# Patient Record
Sex: MALE | Race: OTHER | NOT HISPANIC OR LATINO | Employment: OTHER | URBAN - METROPOLITAN AREA
[De-identification: names, ages, dates, MRNs, and addresses within clinical notes are randomized per-mention and may not be internally consistent; named-entity substitution may affect disease eponyms.]

---

## 2017-01-19 ENCOUNTER — APPOINTMENT (OUTPATIENT)
Dept: LAB | Age: 82
End: 2017-01-19
Payer: COMMERCIAL

## 2017-01-19 ENCOUNTER — TRANSCRIBE ORDERS (OUTPATIENT)
Dept: ADMINISTRATIVE | Age: 82
End: 2017-01-19

## 2017-01-19 DIAGNOSIS — N18.30 CHRONIC KIDNEY DISEASE, STAGE III (MODERATE) (HCC): ICD-10-CM

## 2017-01-19 LAB
ALBUMIN SERPL BCP-MCNC: 3.7 G/DL (ref 3.5–5)
ALP SERPL-CCNC: 109 U/L (ref 46–116)
ALT SERPL W P-5'-P-CCNC: 27 U/L (ref 12–78)
ANION GAP SERPL CALCULATED.3IONS-SCNC: 7 MMOL/L (ref 4–13)
AST SERPL W P-5'-P-CCNC: 16 U/L (ref 5–45)
BACTERIA UR QL AUTO: NORMAL /HPF
BASOPHILS # BLD AUTO: 0.06 THOUSANDS/ΜL (ref 0–0.1)
BASOPHILS NFR BLD AUTO: 1 % (ref 0–1)
BILIRUB SERPL-MCNC: 1.1 MG/DL (ref 0.2–1)
BILIRUB UR QL STRIP: NEGATIVE
BUN SERPL-MCNC: 23 MG/DL (ref 5–25)
CALCIUM SERPL-MCNC: 9.1 MG/DL (ref 8.3–10.1)
CHLORIDE SERPL-SCNC: 105 MMOL/L (ref 100–108)
CLARITY UR: CLEAR
CO2 SERPL-SCNC: 29 MMOL/L (ref 21–32)
COLOR UR: YELLOW
CREAT SERPL-MCNC: 1.62 MG/DL (ref 0.6–1.3)
CREAT UR-MCNC: 180 MG/DL
EOSINOPHIL # BLD AUTO: 0.28 THOUSAND/ΜL (ref 0–0.61)
EOSINOPHIL NFR BLD AUTO: 4 % (ref 0–6)
ERYTHROCYTE [DISTWIDTH] IN BLOOD BY AUTOMATED COUNT: 14.4 % (ref 11.6–15.1)
GFR SERPL CREATININE-BSD FRML MDRD: 40.9 ML/MIN/1.73SQ M
GLUCOSE SERPL-MCNC: 105 MG/DL (ref 65–140)
GLUCOSE UR STRIP-MCNC: NEGATIVE MG/DL
HCT VFR BLD AUTO: 42.2 % (ref 36.5–49.3)
HGB BLD-MCNC: 13.4 G/DL (ref 12–17)
HGB UR QL STRIP.AUTO: NEGATIVE
KETONES UR STRIP-MCNC: NEGATIVE MG/DL
LEUKOCYTE ESTERASE UR QL STRIP: NEGATIVE
LYMPHOCYTES # BLD AUTO: 2.15 THOUSANDS/ΜL (ref 0.6–4.47)
LYMPHOCYTES NFR BLD AUTO: 33 % (ref 14–44)
MAGNESIUM SERPL-MCNC: 2.2 MG/DL (ref 1.6–2.6)
MCH RBC QN AUTO: 29.5 PG (ref 26.8–34.3)
MCHC RBC AUTO-ENTMCNC: 31.8 G/DL (ref 31.4–37.4)
MCV RBC AUTO: 93 FL (ref 82–98)
MONOCYTES # BLD AUTO: 0.62 THOUSAND/ΜL (ref 0.17–1.22)
MONOCYTES NFR BLD AUTO: 10 % (ref 4–12)
NEUTROPHILS # BLD AUTO: 3.35 THOUSANDS/ΜL (ref 1.85–7.62)
NEUTS SEG NFR BLD AUTO: 52 % (ref 43–75)
NITRITE UR QL STRIP: NEGATIVE
NON-SQ EPI CELLS URNS QL MICRO: NORMAL /HPF
NRBC BLD AUTO-RTO: 0 /100 WBCS
PH UR STRIP.AUTO: 5.5 [PH] (ref 4.5–8)
PHOSPHATE SERPL-MCNC: 3 MG/DL (ref 2.3–4.1)
PLATELET # BLD AUTO: 227 THOUSANDS/UL (ref 149–390)
PMV BLD AUTO: 9.8 FL (ref 8.9–12.7)
POTASSIUM SERPL-SCNC: 3.7 MMOL/L (ref 3.5–5.3)
PROT SERPL-MCNC: 7.8 G/DL (ref 6.4–8.2)
PROT UR STRIP-MCNC: ABNORMAL MG/DL
PROT UR-MCNC: 28 MG/DL
PROT/CREAT UR: 0.16 MG/G{CREAT} (ref 0–0.1)
PTH-INTACT SERPL-MCNC: 98.2 PG/ML (ref 14–72)
RBC # BLD AUTO: 4.54 MILLION/UL (ref 3.88–5.62)
RBC #/AREA URNS AUTO: NORMAL /HPF
SODIUM SERPL-SCNC: 141 MMOL/L (ref 136–145)
SP GR UR STRIP.AUTO: 1.02 (ref 1–1.03)
URATE SERPL-MCNC: 4.8 MG/DL (ref 4.2–8)
UROBILINOGEN UR QL STRIP.AUTO: 0.2 E.U./DL
WBC # BLD AUTO: 6.48 THOUSAND/UL (ref 4.31–10.16)
WBC #/AREA URNS AUTO: NORMAL /HPF

## 2017-01-19 PROCEDURE — 81001 URINALYSIS AUTO W/SCOPE: CPT

## 2017-01-19 PROCEDURE — 82570 ASSAY OF URINE CREATININE: CPT

## 2017-01-19 PROCEDURE — 80053 COMPREHEN METABOLIC PANEL: CPT

## 2017-01-19 PROCEDURE — 84100 ASSAY OF PHOSPHORUS: CPT

## 2017-01-19 PROCEDURE — 84156 ASSAY OF PROTEIN URINE: CPT

## 2017-01-19 PROCEDURE — 36415 COLL VENOUS BLD VENIPUNCTURE: CPT

## 2017-01-19 PROCEDURE — 83970 ASSAY OF PARATHORMONE: CPT

## 2017-01-19 PROCEDURE — 83735 ASSAY OF MAGNESIUM: CPT

## 2017-01-19 PROCEDURE — 85025 COMPLETE CBC W/AUTO DIFF WBC: CPT

## 2017-01-19 PROCEDURE — 84550 ASSAY OF BLOOD/URIC ACID: CPT

## 2017-01-23 ENCOUNTER — GENERIC CONVERSION - ENCOUNTER (OUTPATIENT)
Dept: OTHER | Facility: OTHER | Age: 82
End: 2017-01-23

## 2017-01-26 ENCOUNTER — GENERIC CONVERSION - ENCOUNTER (OUTPATIENT)
Dept: OTHER | Facility: OTHER | Age: 82
End: 2017-01-26

## 2017-01-26 ENCOUNTER — ALLSCRIPTS OFFICE VISIT (OUTPATIENT)
Dept: OTHER | Facility: OTHER | Age: 82
End: 2017-01-26

## 2017-03-21 ENCOUNTER — GENERIC CONVERSION - ENCOUNTER (OUTPATIENT)
Dept: OTHER | Facility: OTHER | Age: 82
End: 2017-03-21

## 2017-03-22 ENCOUNTER — TRANSCRIBE ORDERS (OUTPATIENT)
Dept: ADMINISTRATIVE | Age: 82
End: 2017-03-22

## 2017-03-22 ENCOUNTER — APPOINTMENT (OUTPATIENT)
Dept: LAB | Age: 82
End: 2017-03-22
Payer: COMMERCIAL

## 2017-03-22 DIAGNOSIS — N13.8 ENLARGED PROSTATE WITH URINARY OBSTRUCTION: Primary | ICD-10-CM

## 2017-03-22 DIAGNOSIS — N13.8 ENLARGED PROSTATE WITH URINARY OBSTRUCTION: ICD-10-CM

## 2017-03-22 DIAGNOSIS — N40.1 ENLARGED PROSTATE WITH URINARY OBSTRUCTION: ICD-10-CM

## 2017-03-22 DIAGNOSIS — N40.1 ENLARGED PROSTATE WITH URINARY OBSTRUCTION: Primary | ICD-10-CM

## 2017-03-22 LAB
BUN SERPL-MCNC: 24 MG/DL (ref 5–25)
CREAT SERPL-MCNC: 1.52 MG/DL (ref 0.6–1.3)
GFR SERPL CREATININE-BSD FRML MDRD: 43.9 ML/MIN/1.73SQ M
PSA SERPL-MCNC: 3.2 NG/ML (ref 0–4)

## 2017-03-22 PROCEDURE — 82565 ASSAY OF CREATININE: CPT

## 2017-03-22 PROCEDURE — 84520 ASSAY OF UREA NITROGEN: CPT

## 2017-03-22 PROCEDURE — 36415 COLL VENOUS BLD VENIPUNCTURE: CPT

## 2017-03-22 PROCEDURE — G0103 PSA SCREENING: HCPCS

## 2017-04-24 ENCOUNTER — ALLSCRIPTS OFFICE VISIT (OUTPATIENT)
Dept: OTHER | Facility: OTHER | Age: 82
End: 2017-04-24

## 2017-06-05 ENCOUNTER — GENERIC CONVERSION - ENCOUNTER (OUTPATIENT)
Dept: OTHER | Facility: OTHER | Age: 82
End: 2017-06-05

## 2017-06-27 ENCOUNTER — TRANSCRIBE ORDERS (OUTPATIENT)
Dept: URGENT CARE | Age: 82
End: 2017-06-27

## 2017-06-27 ENCOUNTER — APPOINTMENT (OUTPATIENT)
Dept: RADIOLOGY | Age: 82
End: 2017-06-27
Attending: PHYSICIAN ASSISTANT
Payer: COMMERCIAL

## 2017-06-27 ENCOUNTER — OFFICE VISIT (OUTPATIENT)
Dept: URGENT CARE | Age: 82
End: 2017-06-27
Payer: COMMERCIAL
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
## 2017-06-27 PROCEDURE — 71101 X-RAY EXAM UNILAT RIBS/CHEST: CPT

## 2017-06-27 PROCEDURE — 99203 OFFICE O/P NEW LOW 30 MIN: CPT | Performed by: FAMILY MEDICINE

## 2017-06-27 PROCEDURE — S9088 SERVICES PROVIDED IN URGENT: HCPCS | Performed by: FAMILY MEDICINE

## 2017-06-27 PROCEDURE — 90715 TDAP VACCINE 7 YRS/> IM: CPT

## 2017-06-27 PROCEDURE — 73080 X-RAY EXAM OF ELBOW: CPT

## 2017-07-26 DIAGNOSIS — N18.30 CHRONIC KIDNEY DISEASE, STAGE III (MODERATE) (HCC): ICD-10-CM

## 2017-08-15 ENCOUNTER — TRANSCRIBE ORDERS (OUTPATIENT)
Dept: ADMINISTRATIVE | Facility: HOSPITAL | Age: 82
End: 2017-08-15

## 2017-08-15 DIAGNOSIS — I71.9 HYALINE NECROSIS OF AORTA (HCC): Primary | ICD-10-CM

## 2017-09-06 ENCOUNTER — HOSPITAL ENCOUNTER (OUTPATIENT)
Dept: RADIOLOGY | Age: 82
Discharge: HOME | End: 2017-09-06
Payer: COMMERCIAL

## 2017-09-06 DIAGNOSIS — I71.9 HYALINE NECROSIS OF AORTA (HCC): ICD-10-CM

## 2017-09-06 PROCEDURE — 71250 CT THORAX DX C-: CPT

## 2017-09-13 ENCOUNTER — GENERIC CONVERSION - ENCOUNTER (OUTPATIENT)
Dept: OTHER | Facility: OTHER | Age: 82
End: 2017-09-13

## 2017-10-02 ENCOUNTER — ALLSCRIPTS OFFICE VISIT (OUTPATIENT)
Dept: OTHER | Facility: OTHER | Age: 82
End: 2017-10-02

## 2017-10-03 NOTE — PROGRESS NOTES
Assessment  Assessed    1  Coronary artery disease (414 00) (I25 10)   2  Hypertension (401 9) (I10)   3  Aortic aneurysm (441 9) (I71 9)    Plan  Aortic aneurysm    · Follow-up visit in 1 year Evaluation and Treatment  Follow-up  Status: Hold For -  Scheduling  Requested for: [id number]   Ordered; For: Aortic aneurysm; Ordered By: Jenn Mcgregor Performed:  Due: 40FWI3560  Hypertension    · EKG/ECG- POC; Status:Complete;   Done: 32MCY5416 09:47AM   Perform: In Office; Last Updated Claudette Harkins; 10/2/2017 9:47:10 AM;Ordered;For:Hypertension; Ordered By:Jordan Ochoa;    Discussion/Summary  Cardiology Discussion Summary Free Text Note Form [facility]:   All of his assessed cardiac problems are stable  I have reviewed his medications and made no changes  No cardiac testing is ordered  RTO 1 year  Chief Complaint  Chief Complaint Free Text Note Form: f/u  denies any cardiac issues      History of Present Illness  Cardiology HPI Free Text Note Form [facility]: He has not had any cardiac problems since his last OV  His BP is controlled  He is active and denies CP, SOB, palpitations, LE edema  His dilated Ascending Aorta is very stable  ECG is normal       Review of Systems  Cardiology Male ROS:     Cardiac: No complaints of chest pain, no palpitations, no fainiting  Skin: No complaints of nonhealing sores or skin rash  Genitourinary: No complaints of recurrent urinary tract infections, frequent urination at night, difficult urination, blood in urine, kidney stones, loss of bladder control, no kidney or prostate problems, no erectile dysfunction  Psychological: No complaints of feeling depressed, anxiety, panic attacks, or difficulty concentrating  General: No complaints of trouble sleeping, lack of energy, fatigue, appetite changes, weight changes, fever, frequent infections, or night sweats  Respiratory: No complaints of shortness of breath, cough with sputum, or wheezing     HEENT: No complaints of serious problems, hearing problems, nose problems, throat problems, or snoring  Gastrointestinal: No complaints of liver problems, nausea, vomiting, heartburn, constipation, bloody stools, diarrhea, problems swallowing, adbominal pain, or rectal bleeding  Hematologic: No complaints of bleeding disorders, anemia, blood clots, or excessive brusing  Neurological: No complaints of numbness, tingling, dizziness, weakness, seizures, headaches, syncope or fainting, AM fatigue, daytime sleepiness, no witnessed apnea episodes  Musculoskeletal: No complaints of arthritis, back pain, or painfull swelling  ROS Reviewed:   ROS reviewed  Active Problems  Problems    1  Abnormal weight loss (783 21) (R63 4)   2  Acute kidney injury (584 9) (N17 9)   3  Anemia (285 9) (D64 9)   4  Aortic aneurysm (441 9) (I71 9)   5  Bilateral renal cysts (753 10) (N28 1)   6  CKD (chronic kidney disease), stage III (585 3) (N18 3)   7  Coronary artery disease (414 00) (I25 10)   8  Elbow pain (719 42) (M25 529)   9  Fall (E888 9) (W19 XXXA)   10  Hypertension (401 9) (I10)   11  Need for DTP vaccine (V06 1) (Z23)   12  Transient ischemic attack (435 9) (G45 9)    Past Medical History  Problems    1  History of Acute retention of urine (788 29) (R33 8)   2  History of Diverticulitis (562 11) (K57 92)  Active Problems And Past Medical History Reviewed: The active problems and past medical history were reviewed and updated today  Surgical History  Problems    1  History of Colonoscopy (Fiberoptic)   2  History of Sigmoidoscopy (Fiberoptic, Therapeutic )  Surgical History Reviewed: The surgical history was reviewed and updated today  Family History  Sister    1  Family history of diabetes mellitus (V18 0) (Z83 3)  Brother    2  Family history of malignant neoplasm (V16 9) (Z80 9)  Family History Reviewed: The family history was reviewed and updated today         Social History  Problems    · Alcohol use (V49 89) (Z78 9)   · Denied: Drug use (305 90) (F19 90)   · Former smoker (E95 91) (F16 063)  Social History Reviewed: The social history was reviewed and updated today  The social history was reviewed and is unchanged  Current Meds   1  Adult Aspirin Low Strength 81 MG CHEW; 1 Tab 2 x wkly; Therapy: (Recorded:12Nov2015) to Recorded   2  AmLODIPine Besylate 5 MG Oral Tablet; TAKE ONE TABLET BY MOUTH ONCE DAILY; Therapy: 03OSV1453 to (Evaluate:19Apr2018)  Requested for: 24Apr2017; Last   Rx:24Apr2017 Ordered   3  Atorvastatin Calcium 20 MG Oral Tablet; TAKE 1 TABLET DAILY; Therapy: (Recorded:08Jun2015) to Recorded   4  Linzess 145 MCG Oral Capsule; 1 TAB EVERY OTHER DAY; Therapy: (Recorded:08Jun2015) to Recorded   5  Metamucil Clear & Natural POWD; use as directed; Therapy: (Recorded:08Jun2015) to Recorded   6  MiraLax POWD; USE AS DIRECTED; Therapy: (Recorded:08Jun2015) to Recorded   7  Omeprazole 40 MG Oral Capsule Delayed Release; 1 tab  before Breakfast Recorded   8  One Daily Mens TABS; TAKE 1 TABLET DAILY; Therapy: (Recorded:08Jun2015) to Recorded   9  PreserVision AREDS TABS; TAKE 1 TABLET EVERY 12 HOURS; Therapy: (Recorded:12Nov2015) to Recorded   10  Tamsulosin HCl 0 4 MG CP24; TAKE 1 CAPSULE DAILY; Therapy: (Recorded:08Jun2015) to Recorded   11  Vitamin D3 2000 UNIT Oral Tablet; Take 1 daily; Therapy: 66Wvv8300 to (Evaluate:61Dbf5961)  Requested for: 47Yax2983; Last    Rx:16Cls1722 Ordered  Medication List Reviewed: The medication list was reviewed and updated today  Allergies  Medication    1  Adhesive Tape TAPE    Vitals  Vital Signs    Recorded: 76SUM1820 09:48AM   Heart Rate 77   Systolic 429, LUE, Sitting   Diastolic 70, LUE, Sitting   Height 5 ft 4 in   Weight 139 lb    BMI Calculated 23 86   BSA Calculated 1 68     Physical Exam    Constitutional - General appearance: No acute distress, well appearing and well nourished     Eyes - Conjunctiva and Sclera examination: Conjunctiva pink, sclera anicteric  Neck - Normal, no JVD   Pulmonary - Respiratory effort: No signs of respiratory distress  -Auscultation of lungs: Clear to auscultation  Cardiovascular - Auscultation of heart: Normal rate and rhythm, normal S1 and S2, no murmurs  -Pedal pulses: Normal, 2+ bilaterally  -Examination of extremities for edema and/or varicosities: Normal     Abdomen - Soft  Musculoskeletal - Gait and station: Normal gait  Skin - Skin: Normal without rashes  Skin is warm and well perfused  Neurologic - Speech normal  No focal deficits  Psychiatric - Orientation to person, place, and time: Normal       Results/Data  ECG Report: A 12 lead ECG was performed and was normal    Rhythm and rate:  normal sinus rhythm        Signatures   Electronically signed by : ANH Cuenca ; Oct  2 2017 10:15AM EST                       (Author)

## 2017-11-07 ENCOUNTER — GENERIC CONVERSION - ENCOUNTER (OUTPATIENT)
Dept: OTHER | Facility: OTHER | Age: 82
End: 2017-11-07

## 2017-11-08 ENCOUNTER — TRANSCRIBE ORDERS (OUTPATIENT)
Dept: ADMINISTRATIVE | Age: 82
End: 2017-11-08

## 2017-11-08 ENCOUNTER — APPOINTMENT (OUTPATIENT)
Dept: LAB | Age: 82
End: 2017-11-08
Payer: COMMERCIAL

## 2017-11-08 ENCOUNTER — OFFICE VISIT (OUTPATIENT)
Dept: LAB | Age: 82
End: 2017-11-08
Payer: COMMERCIAL

## 2017-11-08 DIAGNOSIS — K40.90 INGUINAL HERNIA WITHOUT OBSTRUCTION OR GANGRENE, RECURRENCE NOT SPECIFIED, UNSPECIFIED LATERALITY: ICD-10-CM

## 2017-11-08 DIAGNOSIS — K40.90 INGUINAL HERNIA WITHOUT OBSTRUCTION OR GANGRENE, RECURRENCE NOT SPECIFIED, UNSPECIFIED LATERALITY: Primary | ICD-10-CM

## 2017-11-08 LAB
ALBUMIN SERPL BCP-MCNC: 3.4 G/DL (ref 3.5–5)
ALP SERPL-CCNC: 98 U/L (ref 46–116)
ALT SERPL W P-5'-P-CCNC: 22 U/L (ref 12–78)
ANION GAP SERPL CALCULATED.3IONS-SCNC: 5 MMOL/L (ref 4–13)
AST SERPL W P-5'-P-CCNC: 14 U/L (ref 5–45)
ATRIAL RATE: 58 BPM
BILIRUB SERPL-MCNC: 0.97 MG/DL (ref 0.2–1)
BUN SERPL-MCNC: 28 MG/DL (ref 5–25)
CALCIUM SERPL-MCNC: 8.9 MG/DL (ref 8.3–10.1)
CHLORIDE SERPL-SCNC: 104 MMOL/L (ref 100–108)
CO2 SERPL-SCNC: 29 MMOL/L (ref 21–32)
CREAT SERPL-MCNC: 1.66 MG/DL (ref 0.6–1.3)
ERYTHROCYTE [DISTWIDTH] IN BLOOD BY AUTOMATED COUNT: 14 % (ref 11.6–15.1)
GFR SERPL CREATININE-BSD FRML MDRD: 37 ML/MIN/1.73SQ M
GLUCOSE SERPL-MCNC: 89 MG/DL (ref 65–140)
HCT VFR BLD AUTO: 41.2 % (ref 36.5–49.3)
HGB BLD-MCNC: 13.1 G/DL (ref 12–17)
MCH RBC QN AUTO: 29.4 PG (ref 26.8–34.3)
MCHC RBC AUTO-ENTMCNC: 31.8 G/DL (ref 31.4–37.4)
MCV RBC AUTO: 93 FL (ref 82–98)
P AXIS: 38 DEGREES
PLATELET # BLD AUTO: 198 THOUSANDS/UL (ref 149–390)
PMV BLD AUTO: 9.2 FL (ref 8.9–12.7)
POTASSIUM SERPL-SCNC: 4.1 MMOL/L (ref 3.5–5.3)
PR INTERVAL: 156 MS
PROT SERPL-MCNC: 7.5 G/DL (ref 6.4–8.2)
QRS AXIS: 59 DEGREES
QRSD INTERVAL: 86 MS
QT INTERVAL: 418 MS
QTC INTERVAL: 410 MS
RBC # BLD AUTO: 4.45 MILLION/UL (ref 3.88–5.62)
SODIUM SERPL-SCNC: 138 MMOL/L (ref 136–145)
T WAVE AXIS: 71 DEGREES
VENTRICULAR RATE: 58 BPM
WBC # BLD AUTO: 7.08 THOUSAND/UL (ref 4.31–10.16)

## 2017-11-08 PROCEDURE — 36415 COLL VENOUS BLD VENIPUNCTURE: CPT

## 2017-11-08 PROCEDURE — 93005 ELECTROCARDIOGRAM TRACING: CPT

## 2017-11-08 PROCEDURE — 85027 COMPLETE CBC AUTOMATED: CPT

## 2017-11-08 PROCEDURE — 80053 COMPREHEN METABOLIC PANEL: CPT

## 2017-11-14 ENCOUNTER — ANESTHESIA EVENT (OUTPATIENT)
Dept: PERIOP | Facility: HOSPITAL | Age: 82
End: 2017-11-14
Payer: COMMERCIAL

## 2017-11-14 RX ORDER — ASPIRIN 81 MG/1
81 TABLET ORAL DAILY
COMMUNITY

## 2017-11-14 RX ORDER — CHOLECALCIFEROL (VITAMIN D3) 125 MCG
2000 CAPSULE ORAL DAILY
COMMUNITY
End: 2018-02-08 | Stop reason: SDUPTHER

## 2017-11-14 RX ORDER — TAMSULOSIN HYDROCHLORIDE 0.4 MG/1
0.4 CAPSULE ORAL
COMMUNITY
End: 2018-02-08 | Stop reason: SDUPTHER

## 2017-11-14 RX ORDER — AMLODIPINE BESYLATE 5 MG/1
5 TABLET ORAL DAILY
COMMUNITY
End: 2018-02-08 | Stop reason: SDUPTHER

## 2017-11-14 RX ORDER — OMEPRAZOLE 40 MG/1
40 CAPSULE, DELAYED RELEASE ORAL DAILY
COMMUNITY
End: 2018-02-08 | Stop reason: SDUPTHER

## 2017-11-14 RX ORDER — POLYETHYLENE GLYCOL 3350 17 G/17G
17 POWDER, FOR SOLUTION ORAL AS NEEDED
COMMUNITY
End: 2018-02-08 | Stop reason: SDUPTHER

## 2017-11-14 RX ORDER — ATORVASTATIN CALCIUM 20 MG/1
20 TABLET, FILM COATED ORAL DAILY
COMMUNITY
End: 2018-02-08 | Stop reason: SDUPTHER

## 2017-11-14 RX ORDER — MULTIVITAMIN
1 CAPSULE ORAL DAILY
COMMUNITY
End: 2018-02-08 | Stop reason: SDUPTHER

## 2017-11-14 NOTE — ANESTHESIA PREPROCEDURE EVALUATION
Review of Systems/Medical History  Patient summary reviewed  Chart reviewed  No history of anesthetic complications     Cardiovascular  Hyperlipidemia, Hypertension , Aortic disease,   Comment: CT chest: IMPRESSION:     Atherosclerosis, tortuous ectatic thoracic aorta  Fusiform aneurysmal dilatation in the lower 3rd of the thoracic aorta without suspicious change      Interval increase in size of RIGHT anterior upper renal lesion likely represents cyst   Suggest confirmation with ultrasound    ,  Pulmonary       GI/Hepatic    GERD ,   Comment: IBS     Prostatic disorder, benign prostatic hyperplasia       Endo/Other     GYN       Hematology   Musculoskeletal       Neurology   Psychology           Physical Exam    Airway    Mallampati score: II  TM Distance: >3 FB  Neck ROM: full     Dental   No notable dental hx     Cardiovascular  Cardiovascular exam normal    Pulmonary  Pulmonary exam normal     Other Findings        Anesthesia Plan  ASA Score- 3       Anesthesia Type- general with ASA Monitors  Additional Monitors:   Airway Plan: ETT  Induction- intravenous  Informed Consent- Anesthetic plan and risks discussed with patient  I personally reviewed this patient with the CRNA  Discussed and agreed on the Anesthesia Plan with the CRNA  Magnolia Rodriguez

## 2017-11-14 NOTE — PRE-PROCEDURE INSTRUCTIONS
Pre-Surgery Instructions:   Medication Instructions    amLODIPine (NORVASC) 5 mg tablet Instructed patient per Anesthesia Guidelines   aspirin (ECOTRIN LOW STRENGTH) 81 mg EC tablet Instructed patient per Anesthesia Guidelines   atorvastatin (LIPITOR) 20 mg tablet Instructed patient per Anesthesia Guidelines   Cholecalciferol (VITAMIN D3) 2000 units TABS Instructed patient per Anesthesia Guidelines   Linaclotide (LINZESS) 145 MCG CAPS Instructed patient per Anesthesia Guidelines   Multiple Vitamin (MULTIVITAMIN) capsule Instructed patient per Anesthesia Guidelines   Multiple Vitamins-Minerals (PRESERVISION AREDS 2 PO) Instructed patient per Anesthesia Guidelines   omeprazole (PriLOSEC) 40 MG capsule Instructed patient per Anesthesia Guidelines   polyethylene glycol (MIRALAX) 17 g packet Instructed patient per Anesthesia Guidelines   psyllium (METAMUCIL) 58 6 % packet Instructed patient per Anesthesia Guidelines   tamsulosin (FLOMAX) 0 4 mg Instructed patient per Anesthesia Guidelines  Pre op instructions reviewed with wife; repeated information as necessary; verbalized understanding   Pt to take amlodipine and omeprazole am of surgery

## 2017-11-15 ENCOUNTER — HOSPITAL ENCOUNTER (OUTPATIENT)
Facility: HOSPITAL | Age: 82
Setting detail: OUTPATIENT SURGERY
Discharge: HOME | End: 2017-11-15
Attending: SURGERY | Admitting: SURGERY
Payer: COMMERCIAL

## 2017-11-15 ENCOUNTER — ANESTHESIA (OUTPATIENT)
Dept: PERIOP | Facility: HOSPITAL | Age: 82
End: 2017-11-15
Payer: COMMERCIAL

## 2017-11-15 VITALS
HEIGHT: 65 IN | DIASTOLIC BLOOD PRESSURE: 85 MMHG | TEMPERATURE: 97.2 F | HEART RATE: 95 BPM | RESPIRATION RATE: 16 BRPM | OXYGEN SATURATION: 91 % | WEIGHT: 140 LBS | SYSTOLIC BLOOD PRESSURE: 149 MMHG | BODY MASS INDEX: 23.32 KG/M2

## 2017-11-15 PROCEDURE — C1781 MESH (IMPLANTABLE): HCPCS | Performed by: SURGERY

## 2017-11-15 DEVICE — BARD MODIFIED KUGEL HERNIA PATCH
Type: IMPLANTABLE DEVICE | Site: INGUINAL | Status: FUNCTIONAL
Brand: BARD MODIFIED KUGEL HERNIA PATCH

## 2017-11-15 RX ORDER — ONDANSETRON 2 MG/ML
4 INJECTION INTRAMUSCULAR; INTRAVENOUS EVERY 6 HOURS PRN
Status: DISCONTINUED | OUTPATIENT
Start: 2017-11-15 | End: 2017-11-15 | Stop reason: HOSPADM

## 2017-11-15 RX ORDER — OXYCODONE HYDROCHLORIDE 5 MG/1
5 TABLET ORAL EVERY 4 HOURS PRN
Status: DISCONTINUED | OUTPATIENT
Start: 2017-11-15 | End: 2017-11-15 | Stop reason: HOSPADM

## 2017-11-15 RX ORDER — ONDANSETRON 2 MG/ML
4 INJECTION INTRAMUSCULAR; INTRAVENOUS ONCE AS NEEDED
Status: DISCONTINUED | OUTPATIENT
Start: 2017-11-15 | End: 2017-11-15 | Stop reason: HOSPADM

## 2017-11-15 RX ORDER — ONDANSETRON 2 MG/ML
INJECTION INTRAMUSCULAR; INTRAVENOUS AS NEEDED
Status: DISCONTINUED | OUTPATIENT
Start: 2017-11-15 | End: 2017-11-15 | Stop reason: SURG

## 2017-11-15 RX ORDER — SODIUM CHLORIDE, SODIUM LACTATE, POTASSIUM CHLORIDE, CALCIUM CHLORIDE 600; 310; 30; 20 MG/100ML; MG/100ML; MG/100ML; MG/100ML
125 INJECTION, SOLUTION INTRAVENOUS CONTINUOUS
Status: DISCONTINUED | OUTPATIENT
Start: 2017-11-15 | End: 2017-11-15 | Stop reason: HOSPADM

## 2017-11-15 RX ORDER — OXYCODONE HYDROCHLORIDE AND ACETAMINOPHEN 5; 325 MG/1; MG/1
1 TABLET ORAL EVERY 4 HOURS PRN
Status: DISCONTINUED | OUTPATIENT
Start: 2017-11-15 | End: 2017-11-15 | Stop reason: HOSPADM

## 2017-11-15 RX ORDER — MAGNESIUM HYDROXIDE 1200 MG/15ML
LIQUID ORAL AS NEEDED
Status: DISCONTINUED | OUTPATIENT
Start: 2017-11-15 | End: 2017-11-15 | Stop reason: HOSPADM

## 2017-11-15 RX ORDER — ONDANSETRON 2 MG/ML
4 INJECTION INTRAMUSCULAR; INTRAVENOUS EVERY 4 HOURS PRN
Status: DISCONTINUED | OUTPATIENT
Start: 2017-11-15 | End: 2017-11-15 | Stop reason: HOSPADM

## 2017-11-15 RX ORDER — EPHEDRINE SULFATE 50 MG/ML
INJECTION, SOLUTION INTRAVENOUS AS NEEDED
Status: DISCONTINUED | OUTPATIENT
Start: 2017-11-15 | End: 2017-11-15 | Stop reason: SURG

## 2017-11-15 RX ORDER — ACETAMINOPHEN 325 MG/1
650 TABLET ORAL EVERY 6 HOURS PRN
Status: DISCONTINUED | OUTPATIENT
Start: 2017-11-15 | End: 2017-11-15 | Stop reason: HOSPADM

## 2017-11-15 RX ORDER — SODIUM CHLORIDE, SODIUM LACTATE, POTASSIUM CHLORIDE, CALCIUM CHLORIDE 600; 310; 30; 20 MG/100ML; MG/100ML; MG/100ML; MG/100ML
75 INJECTION, SOLUTION INTRAVENOUS CONTINUOUS
Status: DISCONTINUED | OUTPATIENT
Start: 2017-11-15 | End: 2017-11-15 | Stop reason: HOSPADM

## 2017-11-15 RX ORDER — LIDOCAINE HYDROCHLORIDE 10 MG/ML
INJECTION, SOLUTION INFILTRATION; PERINEURAL AS NEEDED
Status: DISCONTINUED | OUTPATIENT
Start: 2017-11-15 | End: 2017-11-15 | Stop reason: SURG

## 2017-11-15 RX ORDER — OXYCODONE HYDROCHLORIDE 10 MG/1
10 TABLET ORAL EVERY 4 HOURS PRN
Status: DISCONTINUED | OUTPATIENT
Start: 2017-11-15 | End: 2017-11-15 | Stop reason: HOSPADM

## 2017-11-15 RX ORDER — FENTANYL CITRATE/PF 50 MCG/ML
25 SYRINGE (ML) INJECTION
Status: DISCONTINUED | OUTPATIENT
Start: 2017-11-15 | End: 2017-11-15 | Stop reason: HOSPADM

## 2017-11-15 RX ORDER — BUPIVACAINE HYDROCHLORIDE AND EPINEPHRINE 2.5; 5 MG/ML; UG/ML
INJECTION, SOLUTION INFILTRATION; PERINEURAL AS NEEDED
Status: DISCONTINUED | OUTPATIENT
Start: 2017-11-15 | End: 2017-11-15 | Stop reason: HOSPADM

## 2017-11-15 RX ORDER — PROPOFOL 10 MG/ML
INJECTION, EMULSION INTRAVENOUS AS NEEDED
Status: DISCONTINUED | OUTPATIENT
Start: 2017-11-15 | End: 2017-11-15 | Stop reason: SURG

## 2017-11-15 RX ORDER — FENTANYL CITRATE 50 UG/ML
INJECTION, SOLUTION INTRAMUSCULAR; INTRAVENOUS AS NEEDED
Status: DISCONTINUED | OUTPATIENT
Start: 2017-11-15 | End: 2017-11-15 | Stop reason: SURG

## 2017-11-15 RX ADMIN — FENTANYL CITRATE 50 MCG: 50 INJECTION, SOLUTION INTRAMUSCULAR; INTRAVENOUS at 08:57

## 2017-11-15 RX ADMIN — CEFAZOLIN SODIUM 1000 MG: 1 SOLUTION INTRAVENOUS at 08:35

## 2017-11-15 RX ADMIN — LIDOCAINE HYDROCHLORIDE 50 MG: 10 INJECTION, SOLUTION INFILTRATION; PERINEURAL at 08:38

## 2017-11-15 RX ADMIN — FENTANYL CITRATE 50 MCG: 50 INJECTION, SOLUTION INTRAMUSCULAR; INTRAVENOUS at 08:34

## 2017-11-15 RX ADMIN — EPHEDRINE SULFATE 5 MG: 50 INJECTION, SOLUTION INTRAMUSCULAR; INTRAVENOUS; SUBCUTANEOUS at 08:42

## 2017-11-15 RX ADMIN — SODIUM CHLORIDE, SODIUM LACTATE, POTASSIUM CHLORIDE, AND CALCIUM CHLORIDE 125 ML/HR: .6; .31; .03; .02 INJECTION, SOLUTION INTRAVENOUS at 07:41

## 2017-11-15 RX ADMIN — SODIUM CHLORIDE, SODIUM LACTATE, POTASSIUM CHLORIDE, AND CALCIUM CHLORIDE: .6; .31; .03; .02 INJECTION, SOLUTION INTRAVENOUS at 09:20

## 2017-11-15 RX ADMIN — PROPOFOL 100 MG: 10 INJECTION, EMULSION INTRAVENOUS at 08:38

## 2017-11-15 RX ADMIN — ONDANSETRON 4 MG: 2 INJECTION INTRAMUSCULAR; INTRAVENOUS at 08:55

## 2017-11-15 NOTE — DISCHARGE INSTRUCTIONS
Diet and activity as tolerated  May shower  May drive when not taking pain medication  Please call [phone] for a follow-up office visit for 2 weeks

## 2017-11-15 NOTE — OP NOTE
OPERATIVE REPORT  PATIENT NAME: Sara Jones    :  1933  MRN: [id number]  Pt Location: [facility] OR ROOM 07    SURGERY DATE: 11/15/2017    Surgeon(s) and Role:     * Lori Rowe MD - Primary     * Seb Perkins - Assisting    Preop Diagnosis:  Unilateral inguinal hernia without obstruction or gangrene, recurrent [K40 91]    Post-Op Diagnosis Codes:     * Unilateral inguinal hernia without obstruction or gangrene, recurrent [K40 91]    Procedure(s) (LRB):  RECURRENT INGUNIAL HERNIA REPAIR (Left)    Specimen(s):  * No specimens in log *    Estimated Blood Loss:   Minimal    Drains:       Anesthesia Type:   General    Operative Indications:  Unilateral inguinal hernia without obstruction or gangrene, recurrent [K40 91]      Operative Findings:    Independent, nonsmoker, wound class 1, ASA 3, BMI 23, weight 140, height 65  Cardiovascular  Hyperlipidemia, Hypertension , Aortic disease,   Comment: CT chest: IMPRESSION:     Atherosclerosis, tortuous ectatic thoracic aorta  Fusiform aneurysmal dilatation in the lower 3rd of the thoracic aorta without suspicious change      Interval increase in size of RIGHT anterior upper renal lesion likely represents cyst   Suggest confirmation with ultrasound    ,  Pulmonary   GI/Hepatic  GERD ,   Comment: IBS   Prostatic disorder, benign prostatic hyperplasia   Endo/Other GYN   Hematology Musculoskeletal   Neurology Psychology       Complications:   None    Procedure and Technique:  Patient was identified visually and via armband  Placed in the supine position  After general anesthesia the abdomen was prepped and draped in a sterile fashion  0 25% Marcaine with epinephrine was utilized throughout the procedure  An incision was made in the left inguinal region  This carried down through skin subcutaneous tissue  Under direct vision the external oblique fibers were divided  Self-retaining retractor was then placed      The entire transversalis floor was attenuated and weak   There is evidence for direct as well as indirect inguinal herniations  The cord structures were encircled in a Penrose drain  An underlay mesh was then placed  The inguinal floor was reconstituted with interrupted figure-of-eight 1  Vicryl suture  Onlay mesh was then secured  The area was irrigated  It was aspirated dry  Hemostasis was assured  The wound was closed with 3 0 PDS for external oblique followed by 3 0 Vicryl subcutaneous and 4 Monocryl sutures  Dermabond was then applied  The patient tolerated the procedure well  Sponge and instrument count correct     I was present for the entire procedure    Patient Disposition:  PACU     SIGNATURE: Rolando Tian MD  DATE: November 15, 2017  TIME: 9:50 AM

## 2017-11-15 NOTE — PERIOPERATIVE NURSING NOTE
8544 Arrived PACU supine HOB 30 deg resps unlabored  VSS  No c/o pain at this time  Left groin incision clear

## 2018-01-11 NOTE — RESULT NOTES
Verified Results  (1) CBC/PLT/DIFF 50VCR0520 08:57AM Chantel Ho   TW Order Number: [id number]  TW Order Number: [id number]     Test Name Result Flag Reference   WBC COUNT 6 48 Thousand/uL  4 31-10 16   RBC COUNT 4 54 Million/uL  3 88-5 62   HEMOGLOBIN 13 4 g/dL  12 0-17 0   HEMATOCRIT 42 2 %  36 5-49 3   MCV 93 fL  82-98   MCH 29 5 pg  26 8-34 3   MCHC 31 8 g/dL  31 4-37 4   RDW 14 4 %  11 6-15 1   MPV 9 8 fL  8 9-12 7   PLATELET COUNT 168 Thousands/uL  149-390   nRBC AUTOMATED 0 /100 WBCs     NEUTROPHILS RELATIVE PERCENT 52 %  43-75   LYMPHOCYTES RELATIVE PERCENT 33 %  14-44   MONOCYTES RELATIVE PERCENT 10 %  4-12   EOSINOPHILS RELATIVE PERCENT 4 %  0-6   BASOPHILS RELATIVE PERCENT 1 %  0-1   NEUTROPHILS ABSOLUTE COUNT 3 35 Thousands/?L  1 85-7 62   LYMPHOCYTES ABSOLUTE COUNT 2 15 Thousands/?L  0 60-4 47   MONOCYTES ABSOLUTE COUNT 0 62 Thousand/?L  0 17-1 22   EOSINOPHILS ABSOLUTE COUNT 0 28 Thousand/?L  0 00-0 61   BASOPHILS ABSOLUTE COUNT 0 06 Thousands/?L  0 00-0 10   - Patient Instructions: This bloodwork is non-fasting  Please drink two glasses of water morning of bloodwork  - Patient Instructions: This bloodwork is non-fasting  Please drink two glasses of water morning of bloodwork  - Patient Instructions: This bloodwork is non-fasting  Please drink two glasses of water morning of bloodwork  - Patient Instructions: This bloodwork is non-fasting  Please drink two glasses of water morning of bloodwork  (1) COMPREHENSIVE METABOLIC PANEL 90OOO6069 95:98MY Chantel Ho   TW Order Number: [id number]  TW Order Number: [id number]     Test Name Result Flag Reference   GLUCOSE,RANDM 105 mg/dL     If the patient is fasting, the ADA then defines impaired fasting glucose as > 100 mg/dL and diabetes as > or equal to 123 mg/dL     SODIUM 141 mmol/L  136-145   POTASSIUM 3 7 mmol/L  3 5-5 3   CHLORIDE 105 mmol/L  100-108   CARBON DIOXIDE 29 mmol/L  21-32   ANION GAP (CALC) 7 mmol/L  4-13   BLOOD UREA NITROGEN 23 mg/dL  5-25   CREATININE 1 62 mg/dL H 0 60-1 30   Standardized to IDMS reference method   CALCIUM 9 1 mg/dL  8 3-10 1   BILI, TOTAL 1 10 mg/dL H 0 20-1 00   ALK PHOSPHATAS 109 U/L     ALT (SGPT) 27 U/L  12-78   AST(SGOT) 16 U/L  5-45   ALBUMIN 3 7 g/dL  3 5-5 0   TOTAL PROTEIN 7 8 g/dL  6 4-8 2   eGFR Non-African American 40 9 ml/min/1 73sq m     [facility] Disease Education Program recommendations are as follows:  GFR calculation is accurate only with a steady state creatinine  Chronic Kidney disease less than 60 ml/min/1 73 sq  meters  Kidney failure less than 15 ml/min/1 73 sq  meters  (1) MAGNESIUM 19XBN6678 08:57AM Chantel Locks   TW Order Number: [id number]  TW Order Number: [id number]     Test Name Result Flag Reference   MAGNESIUM 2 2 mg/dL  1 6-2 6     (1) PHOSPHORUS 98CIF1380 08:57AM Chantel [employer]   TW Order Number: [id number]  TW Order Number: [id number]     Test Name Result Flag Reference   PHOSPHORUS 3 0 mg/dL  2 3-4 1     (1) PTH N-TERMINAL (INTACT) 68WIN7647 08:57AM Chantel Locks   TW Order Number: [id number]  TW Order Number: [id number]     Test Name Result Flag Reference   PARATHYROID HORMONE INTACT 98 2 pg/mL H 14 0-72 0     (1) URIC ACID 38TMK4456 08:57AM Chantel Locks   TW Order Number: [id number]  TW Order Number: [id number]     Test Name Result Flag Reference   URIC ACID 4 8 mg/dL  4 2-8 0   Specimen collection should occur prior to Metamizole administration due to the potential for falsely depressed results       (1) URINE PROTEIN CREATININE RATIO 83YON7634 08:57AM Chantel Locks   TW Order Number: [id number]  TW Order Number: [id number]     Test Name Result Flag Reference   CREATININE URINE 180 0 mg/dL     URINE PROTEIN:CREATININE RATIO 0 16 H 0 00-0 10   URINE PROTEIN 28 mg/dL       (1) URINALYSIS (will reflex a microscopy if leukocytes, occult blood, protein or nitrites are not within normal limits) 69ZRY6081 08:57AM Rubina Graham    Order Number: [id number]  TW Order Number: [id number]     Test Name Result Flag Reference   COLOR Yellow     CLARITY Clear     SPECIFIC GRAVITY UA 1 022  1 003-1 030   PH UA 5 5  4 5-8 0   LEUKOCYTE ESTERASE UA Negative  Negative   NITRITE UA Negative  Negative   PROTEIN UA Trace mg/dl A Negative   GLUCOSE UA Negative mg/dl  Negative   KETONES UA Negative mg/dl  Negative   UROBILINOGEN UA 0 2 E U /dl  0 2, 1 0 E U /dl   BILIRUBIN UA Negative  Negative   BLOOD UA Negative  Negative   BACTERIA None Seen /hpf  None Seen, Occasional   EPITHELIAL CELLS None Seen /hpf  None Seen, Occasional   RBC UA None Seen /hpf  None Seen   WBC UA None Seen /hpf  None Seen

## 2018-01-12 VITALS
SYSTOLIC BLOOD PRESSURE: 118 MMHG | HEIGHT: 64 IN | HEART RATE: 77 BPM | BODY MASS INDEX: 23.73 KG/M2 | DIASTOLIC BLOOD PRESSURE: 70 MMHG | WEIGHT: 139 LBS

## 2018-01-13 VITALS
HEART RATE: 73 BPM | WEIGHT: 141.5 LBS | SYSTOLIC BLOOD PRESSURE: 132 MMHG | HEIGHT: 64 IN | BODY MASS INDEX: 24.16 KG/M2 | DIASTOLIC BLOOD PRESSURE: 82 MMHG

## 2018-01-13 NOTE — MISCELLANEOUS
Message  Patient's blood pressures have been fluctuating somewhat been higher than 360 systolic and has had blood pressures as low as 316 systolic does get lightheadedness and symptomatic  Last office visit decreased his amlodipine to 2 5 mg daily   At this point given his increased blood pressures will increase his amlodipine to 2 5 mg twice a day with instructions to hold if less than 120/80      Plan  Hypertension    · From  AmLODIPine Besylate 5 MG Oral Tablet Take 1/2  tablet daily To  AmLODIPine Besylate 5 MG Oral Tablet (Norvasc) TAKE 0 5 TABLET Twice daily    Signatures   Electronically signed by : Roney Doshi DO; Mar  9 2017 12:21PM EST                       (Author)

## 2018-01-14 VITALS
BODY MASS INDEX: 24.75 KG/M2 | HEIGHT: 64 IN | SYSTOLIC BLOOD PRESSURE: 150 MMHG | DIASTOLIC BLOOD PRESSURE: 60 MMHG | WEIGHT: 145 LBS | HEART RATE: 68 BPM

## 2018-01-15 NOTE — MISCELLANEOUS
Message   Recorded as Task   Date: 05/30/2017 09:57 AM, Created By: Galilea Clark   Task Name: Miscellaneous   Assigned To: Galilea Clark   Regarding Patient: Mandie Jimenez, Status: In Progress   AimeeYesenia Smith - 30 May 2017 9:57 AM     TASK CREATED  Shraddha Delgado' wife called wondering if it was truely necessary for Asia Dobson to come in for his July f/u appt  She stated that he saw Dr Mary Lou Varela (Urologist) in March  He was last seen in Jan  She would like for him to f/u on a yearly basis, if possible  Let me know what you would like to do, I will give her a call back either way  Thanks   Dakota Amaya - 02 Jun 2017 2:29 PM     TASK REPLIED TO: Previously Assigned To Dakota Amaya  yearly visit is fine  thanks  Lela Galvan - 05 Jun 2017 9:01 AM     TASK IN PROGRESS   Per Dr Jannette Michele, a yearly visit is okay for Asia Dobson  I called and let his wife know  Asia Dobson is on Jan 2018 wait list       Active Problems    1  Abnormal weight loss (783 21) (R63 4)   2  Acute kidney injury (584 9) (N17 9)   3  Anemia (285 9) (D64 9)   4  Aortic aneurysm (441 9) (I71 9)   5  Bilateral renal cysts (753 10) (N28 1)   6  CKD (chronic kidney disease), stage III (585 3) (N18 3)   7  Coronary artery disease (414 00) (I25 10)   8  Hypertension (401 9) (I10)   9  Transient ischemic attack (435 9) (G45 9)    Current Meds   1  Adult Aspirin Low Strength 81 MG CHEW; 1 Tab 2 x wkly; Therapy: (Recorded:12Nov2015) to Recorded   2  AmLODIPine Besylate 5 MG Oral Tablet; TAKE ONE TABLET BY MOUTH ONCE DAILY; Therapy: 31UJL8937 to (Evaluate:19Apr2018)  Requested for: 24Apr2017; Last   Rx:24Apr2017 Ordered   3  Atorvastatin Calcium 20 MG Oral Tablet; TAKE 1 TABLET DAILY; Therapy: (Recorded:08Jun2015) to Recorded   4  Linzess 145 MCG Oral Capsule; 1 TAB EVERY OTHER DAY; Therapy: (Recorded:08Jun2015) to Recorded   5  Metamucil Clear & Natural POWD; use as directed; Therapy: (Recorded:08Jun2015) to Recorded   6   MiraLax POWD; USE AS DIRECTED; Therapy: (Recorded:08Jun2015) to Recorded   7  Omeprazole 40 MG Oral Capsule Delayed Release; 1 tab  before Breakfast Recorded   8  One Daily Mens TABS; TAKE 1 TABLET DAILY; Therapy: (Recorded:08Jun2015) to Recorded   9  PreserVision AREDS TABS; TAKE 1 TABLET EVERY 12 HOURS; Therapy: (Recorded:12Nov2015) to Recorded   10  Tamsulosin HCl 0 4 MG CP24; TAKE 1 CAPSULE DAILY; Therapy: (Recorded:08Jun2015) to Recorded   11  Vitamin D3 2000 UNIT Oral Tablet; Take 1 daily; Therapy: 21Gik7650 to (Evaluate:15Xhv5532)  Requested for: 57Ffa5736; Last    Rx:[id number] Ordered    Allergies    1   Adhesive Tape TAPE    Signatures   Electronically signed by : Kalin Mooney DO; Jun 5 2017 12:56PM EST                       (Author)

## 2018-01-17 NOTE — RESULT NOTES
Verified Results  (1) URINALYSIS (will reflex a microscopy if leukocytes, occult blood, protein or nitrites are not within normal limits) 76VWZ1780 03:44PM Chef Dovunque     Test Name Result Flag Reference   COLOR Yellow     CLARITY Clear     SPECIFIC GRAVITY UA 1 014  1 003-1 030   PH UA 6 0  4 5-8 0   LEUKOCYTE ESTERASE UA Negative  Negative   NITRITE UA Negative  Negative   PROTEIN UA Negative mg/dl  Negative, >300   GLUCOSE UA Negative mg/dl  Negative   KETONES UA Negative mg/dl  Negative   UROBILINOGEN UA 0 2 E U /dl  0 2, 1 0 E U /dl   BILIRUBIN UA Negative  Negative   BLOOD UA Negative  Negative     (1) BASIC METABOLIC PROFILE 61QWP3151 01:16PM Chef Dovunque   National Kidney Disease Education Program recommendations are as follows:  GFR calculation is accurate only with a steady state creatinine  Chronic Kidney disease less than 60 ml/min/1 73 sq  meters  Kidney failure less than 15 ml/min/1 73 sq  meters  Test Name Result Flag Reference   GLUCOSE,RANDM 95 mg/dL     If the patient is fasting, the ADA then defines impaired fasting glucose as > 100 mg/dL and diabetes as > or equal to 123 mg/dL     SODIUM 136 mmol/L  136-145   POTASSIUM 4 4 mmol/L  3 5-5 3   CHLORIDE 102 mmol/L  100-108   CARBON DIOXIDE 28 mmol/L  21-32   ANION GAP (CALC) 6 mmol/L  4-13   BLOOD UREA NITROGEN 32 mg/dL H 5-25   CREATININE 1 69 mg/dL H 0 60-1 30   CALCIUM 8 7 mg/dL  8 3-10 1   eGFR Non-African American 39 0 ml/min/1 73sq m       (1) CBC/PLT/DIFF 12TKL4329 12:07PM Chef Dovunque     Test Name Result Flag Reference   WBC COUNT 7 64 Thousand/uL  4 31-10 16   RBC COUNT 4 77 Million/uL  3 88-5 62   HEMOGLOBIN 14 0 g/dL  12 0-17 0   HEMATOCRIT 43 9 %  36 5-49 3   MCV 92 0 fL  82 0-98 0   MCH 29 4 pg  26 8-34 3   MCHC 31 9 g/dL  31 4-37 4   RDW 13 8 %  11 6-15 1   MPV 9 4 fL  8 9-12 7   PLATELET COUNT 478 Thousands/uL  149-390   nRBC AUTOMATED 0 /100 WBCs     NEUTROPHILS RELATIVE PERCENT 55 %  43-75   LYMPHOCYTES RELATIVE PERCENT 30 %  14-44   MONOCYTES RELATIVE PERCENT 9 %  4-12   EOSINOPHILS RELATIVE PERCENT 5 %  0-6   BASOPHILS RELATIVE PERCENT 1 %  0-1   NEUTROPHILS ABSOLUTE COUNT 4 31 Thousands/µL  1 85-7 62   LYMPHOCYTES ABSOLUTE COUNT 2 26 Thousands/µL  0 60-4 47   MONOCYTES ABSOLUTE COUNT 0 65 Thousand/µL  0 17-1 22   EOSINOPHILS ABSOLUTE COUNT 0 34 Thousand/µL  0 00-0 61   BASOPHILS ABSOLUTE COUNT 0 06 Thousands/µL  0 00-0 10

## 2018-01-17 NOTE — MISCELLANEOUS
Message   Recorded as Task   Date: 01/19/2016 10:18 AM, Created By: Alton Riggs   Task Name: Follow Up   Assigned To: Kimberley Rae   Regarding Patient: Kathryn Frausto, Status: Active   Comment:    AmayaDakota hermosillo - 19 Jan 2016 10:18 AM     TASK CREATED  Can you send/fax this note to his Primary Dr Manohar [facility] office number [phone]    1 [facility], [address] [place]  Faxed slip to PCP    AG      Active Problems    1  Abnormal weight loss (783 21) (R63 4)   2  Acute kidney injury (584 9) (N17 9)   3  Anemia (285 9) (D64 9)   4  Aortic aneurysm (441 9) (I71 9)   5  Bilateral renal cysts (753 10) (Q61 02)   6  CKD (chronic kidney disease), stage III (585 3) (N18 3)   7  Coronary artery disease (414 00) (I25 10)   8  Hypertension (401 9) (I10)   9  Transient ischemic attack (435 9) (G45 9)    Current Meds   1  Adult Aspirin Low Strength 81 MG CHEW; 1 Tab 2 x wkly; Therapy: (Recorded:12Nov2015) to Recorded   2  Atorvastatin Calcium 20 MG Oral Tablet; TAKE 1 TABLET DAILY; Therapy: (Recorded:08Jun2015) to Recorded   3  Linzess 145 MCG Oral Capsule; 1 TAB EVERY OTHER DAY; Therapy: (Recorded:08Jun2015) to Recorded   4  Metamucil Clear & Natural POWD; use as directed; Therapy: (Recorded:08Jun2015) to Recorded   5  MiraLax POWD; USE AS DIRECTED; Therapy: (Recorded:08Jun2015) to Recorded   6  Omeprazole 40 MG Oral Capsule Delayed Release; 1 tab  before Breakfast Recorded   7  One Daily Mens TABS; TAKE 1 TABLET DAILY; Therapy: (Recorded:08Jun2015) to Recorded   8  PreserVision AREDS TABS; TAKE 1 TABLET EVERY 12 HOURS; Therapy: (Recorded:12Nov2015) to Recorded   9  Tamsulosin HCl 0 4 MG CP24; TAKE 1 CAPSULE DAILY; Therapy: (Recorded:08Jun2015) to Recorded   10  Vitamin D 2000 UNIT Oral Tablet; Take 1 tablet daily; Therapy: [id number] to (Evaluate:14Oct2016) Recorded    Allergies    1   Adhesive Tape TAPE    Signatures   Electronically signed by : Tonny Harry DO; Jan 22 2016  1:40PM EST                       (Author)

## 2018-01-22 VITALS — DIASTOLIC BLOOD PRESSURE: 78 MMHG | SYSTOLIC BLOOD PRESSURE: 124 MMHG

## 2018-01-22 VITALS
OXYGEN SATURATION: 97 % | SYSTOLIC BLOOD PRESSURE: 132 MMHG | HEART RATE: 76 BPM | WEIGHT: 143 LBS | TEMPERATURE: 97 F | HEIGHT: 64 IN | BODY MASS INDEX: 24.41 KG/M2 | DIASTOLIC BLOOD PRESSURE: 80 MMHG | RESPIRATION RATE: 14 BRPM

## 2018-01-30 ENCOUNTER — APPOINTMENT (OUTPATIENT)
Dept: LAB | Age: 83
End: 2018-01-30
Payer: COMMERCIAL

## 2018-01-30 ENCOUNTER — TRANSCRIBE ORDERS (OUTPATIENT)
Dept: ADMINISTRATIVE | Age: 83
End: 2018-01-30

## 2018-01-30 DIAGNOSIS — N18.30 CHRONIC KIDNEY DISEASE, STAGE III (MODERATE) (HCC): ICD-10-CM

## 2018-01-30 LAB
ALBUMIN SERPL BCP-MCNC: 3.6 G/DL (ref 3.5–5)
ALP SERPL-CCNC: 101 U/L (ref 46–116)
ALT SERPL W P-5'-P-CCNC: 23 U/L (ref 12–78)
ANION GAP SERPL CALCULATED.3IONS-SCNC: 8 MMOL/L (ref 4–13)
AST SERPL W P-5'-P-CCNC: 18 U/L (ref 5–45)
BACTERIA UR QL AUTO: ABNORMAL /HPF
BASOPHILS # BLD AUTO: 0.06 THOUSANDS/ΜL (ref 0–0.1)
BASOPHILS NFR BLD AUTO: 1 % (ref 0–1)
BILIRUB SERPL-MCNC: 1.16 MG/DL (ref 0.2–1)
BILIRUB UR QL STRIP: NEGATIVE
BUN SERPL-MCNC: 21 MG/DL (ref 5–25)
CALCIUM SERPL-MCNC: 8.5 MG/DL (ref 8.3–10.1)
CHLORIDE SERPL-SCNC: 102 MMOL/L (ref 100–108)
CLARITY UR: CLEAR
CO2 SERPL-SCNC: 29 MMOL/L (ref 21–32)
COLOR UR: YELLOW
CREAT SERPL-MCNC: 1.51 MG/DL (ref 0.6–1.3)
CREAT UR-MCNC: 141 MG/DL
EOSINOPHIL # BLD AUTO: 0.36 THOUSAND/ΜL (ref 0–0.61)
EOSINOPHIL NFR BLD AUTO: 5 % (ref 0–6)
ERYTHROCYTE [DISTWIDTH] IN BLOOD BY AUTOMATED COUNT: 14.7 % (ref 11.6–15.1)
GFR SERPL CREATININE-BSD FRML MDRD: 42 ML/MIN/1.73SQ M
GLUCOSE P FAST SERPL-MCNC: 118 MG/DL (ref 65–99)
GLUCOSE UR STRIP-MCNC: NEGATIVE MG/DL
HCT VFR BLD AUTO: 42.2 % (ref 36.5–49.3)
HGB BLD-MCNC: 13.5 G/DL (ref 12–17)
HGB UR QL STRIP.AUTO: NEGATIVE
HYALINE CASTS #/AREA URNS LPF: ABNORMAL /LPF
KETONES UR STRIP-MCNC: NEGATIVE MG/DL
LEUKOCYTE ESTERASE UR QL STRIP: ABNORMAL
LYMPHOCYTES # BLD AUTO: 2.04 THOUSANDS/ΜL (ref 0.6–4.47)
LYMPHOCYTES NFR BLD AUTO: 29 % (ref 14–44)
MAGNESIUM SERPL-MCNC: 2.2 MG/DL (ref 1.6–2.6)
MCH RBC QN AUTO: 29.5 PG (ref 26.8–34.3)
MCHC RBC AUTO-ENTMCNC: 32 G/DL (ref 31.4–37.4)
MCV RBC AUTO: 92 FL (ref 82–98)
MONOCYTES # BLD AUTO: 0.57 THOUSAND/ΜL (ref 0.17–1.22)
MONOCYTES NFR BLD AUTO: 8 % (ref 4–12)
NEUTROPHILS # BLD AUTO: 4.09 THOUSANDS/ΜL (ref 1.85–7.62)
NEUTS SEG NFR BLD AUTO: 57 % (ref 43–75)
NITRITE UR QL STRIP: NEGATIVE
NON-SQ EPI CELLS URNS QL MICRO: ABNORMAL /HPF
NRBC BLD AUTO-RTO: 0 /100 WBCS
PH UR STRIP.AUTO: 6 [PH] (ref 4.5–8)
PHOSPHATE SERPL-MCNC: 3.2 MG/DL (ref 2.3–4.1)
PLATELET # BLD AUTO: 221 THOUSANDS/UL (ref 149–390)
PMV BLD AUTO: 9.2 FL (ref 8.9–12.7)
POTASSIUM SERPL-SCNC: 4.1 MMOL/L (ref 3.5–5.3)
PROT SERPL-MCNC: 7.6 G/DL (ref 6.4–8.2)
PROT UR STRIP-MCNC: ABNORMAL MG/DL
PROT UR-MCNC: 30 MG/DL
PROT/CREAT UR: 0.21 MG/G{CREAT} (ref 0–0.1)
PTH-INTACT SERPL-MCNC: 113.9 PG/ML (ref 14–72)
RBC # BLD AUTO: 4.58 MILLION/UL (ref 3.88–5.62)
RBC #/AREA URNS AUTO: ABNORMAL /HPF
SODIUM SERPL-SCNC: 139 MMOL/L (ref 136–145)
SP GR UR STRIP.AUTO: 1.02 (ref 1–1.03)
URATE SERPL-MCNC: 5.1 MG/DL (ref 4.2–8)
UROBILINOGEN UR QL STRIP.AUTO: 0.2 E.U./DL
WBC # BLD AUTO: 7.14 THOUSAND/UL (ref 4.31–10.16)
WBC #/AREA URNS AUTO: ABNORMAL /HPF

## 2018-01-30 PROCEDURE — 84100 ASSAY OF PHOSPHORUS: CPT

## 2018-01-30 PROCEDURE — 36415 COLL VENOUS BLD VENIPUNCTURE: CPT

## 2018-01-30 PROCEDURE — 84550 ASSAY OF BLOOD/URIC ACID: CPT

## 2018-01-30 PROCEDURE — 83970 ASSAY OF PARATHORMONE: CPT

## 2018-01-30 PROCEDURE — 83735 ASSAY OF MAGNESIUM: CPT

## 2018-01-30 PROCEDURE — 80053 COMPREHEN METABOLIC PANEL: CPT

## 2018-01-30 PROCEDURE — 81001 URINALYSIS AUTO W/SCOPE: CPT

## 2018-01-30 PROCEDURE — 85025 COMPLETE CBC W/AUTO DIFF WBC: CPT

## 2018-01-30 PROCEDURE — 84156 ASSAY OF PROTEIN URINE: CPT

## 2018-01-30 PROCEDURE — 82570 ASSAY OF URINE CREATININE: CPT

## 2018-02-08 ENCOUNTER — OFFICE VISIT (OUTPATIENT)
Dept: NEPHROLOGY | Facility: CLINIC | Age: 83
End: 2018-02-08
Payer: COMMERCIAL

## 2018-02-08 VITALS
HEIGHT: 64 IN | DIASTOLIC BLOOD PRESSURE: 83 MMHG | WEIGHT: 136.8 LBS | BODY MASS INDEX: 23.35 KG/M2 | SYSTOLIC BLOOD PRESSURE: 128 MMHG

## 2018-02-08 DIAGNOSIS — I25.10 CORONARY ARTERY DISEASE INVOLVING NATIVE CORONARY ARTERY OF NATIVE HEART WITHOUT ANGINA PECTORIS: ICD-10-CM

## 2018-02-08 DIAGNOSIS — N18.30 CKD (CHRONIC KIDNEY DISEASE), STAGE III (HCC): ICD-10-CM

## 2018-02-08 DIAGNOSIS — I10 ESSENTIAL HYPERTENSION: Primary | ICD-10-CM

## 2018-02-08 DIAGNOSIS — N28.1 BILATERAL RENAL CYSTS: ICD-10-CM

## 2018-02-08 DIAGNOSIS — N17.9 ACUTE KIDNEY INJURY (HCC): ICD-10-CM

## 2018-02-08 PROBLEM — N25.81 SECONDARY HYPERPARATHYROIDISM (HCC): Status: ACTIVE | Noted: 2018-02-08

## 2018-02-08 PROCEDURE — 99214 OFFICE O/P EST MOD 30 MIN: CPT | Performed by: INTERNAL MEDICINE

## 2018-02-08 RX ORDER — POLYETHYLENE GLYCOL 3350 17 G/17G
POWDER, FOR SOLUTION ORAL
COMMUNITY
End: 2018-05-11 | Stop reason: ALTCHOICE

## 2018-02-08 RX ORDER — ATORVASTATIN CALCIUM 20 MG/1
1 TABLET, FILM COATED ORAL
COMMUNITY

## 2018-02-08 RX ORDER — AMLODIPINE BESYLATE 5 MG/1
7.5 TABLET ORAL DAILY
COMMUNITY
Start: 2016-02-12 | End: 2018-07-26 | Stop reason: SDUPTHER

## 2018-02-08 RX ORDER — TAMSULOSIN HYDROCHLORIDE 0.4 MG/1
1 CAPSULE ORAL DAILY
COMMUNITY

## 2018-02-08 RX ORDER — OMEPRAZOLE 40 MG/1
CAPSULE, DELAYED RELEASE ORAL
COMMUNITY

## 2018-02-08 RX ORDER — VIT A/VIT C/VIT E/ZINC/COPPER 2148-113
1 TABLET ORAL EVERY 12 HOURS
COMMUNITY

## 2018-02-08 RX ORDER — ASPIRIN 81 MG/1
TABLET, CHEWABLE ORAL
COMMUNITY
End: 2018-02-08 | Stop reason: SDUPTHER

## 2018-02-08 RX ORDER — ACETAMINOPHEN 160 MG
TABLET,DISINTEGRATING ORAL DAILY
COMMUNITY
Start: 2016-07-21

## 2018-02-08 RX ORDER — OXYCODONE HYDROCHLORIDE AND ACETAMINOPHEN 5; 325 MG/1; MG/1
TABLET ORAL
COMMUNITY
Start: 2017-11-15 | End: 2018-05-11 | Stop reason: ALTCHOICE

## 2018-02-08 NOTE — LETTER
February 8, 2018     Joséia Severs, [address] [facility] [address] [id number]    Patient: Brittny Powers   YOB: 1933   Date of Visit: 2/8/2018       Dear Dr Isauro Gold: Thank you for referring Mj Ontiveros to me for evaluation  Below are my notes for this consultation  If you have questions, please do not hesitate to call me  I look forward to following your patient along with you           Sincerely,        Guillermo García,         CC: No Recipients

## 2018-02-08 NOTE — PROGRESS NOTES
OFFICE FOLLOW UP - Nephrology   Imelda Mcallister 80 y o  male MRN: [id number]    Encounter: [id number]      ASSESSMENT and PLAN:  Bc Patrick was seen today for follow-up  Diagnoses and all orders for this visit:    Essential hypertension  - he has a history of an aortic aneurysm, his goal blood pressures less than 140/80  - he seems to be achieving this at home his blood pressures average 120/80 and sometimes go into the 604 systolic range  - he is currently on amlodipine 5 mg a day which we will continue he is tolerating this dose and medication well    Coronary artery disease involving native coronary artery of native heart without angina pectoris  - following up with Cardiology regularly    Acute kidney injury [facility])  - he has had episodes of this in the past, currently his renal function remains stable and his creatinine at baseline is 1 5    Bilateral renal cysts  - he has a history of bilateral renal cysts, a surveillance CT scan for an aortic aneurysm was done in September and did show slight enlargement of this cyst, will repeat a renal ultrasound now to further define this  - he follows up with Urology regularly and will see them next  Month as well    CKD (chronic kidney disease), stage III  - his creatinine remained stable at 1 5 his estimated GFR is around 45 mL/minute  - his urine output has been stable he is avoiding nephrotoxic agents  - his blood pressures have been well controlled and he has no diabetes  - continue to monitor for anemia related to chronic kidney disease    secondary hyperparathyroidism  - his PTH is around 100, will continue vitamin-D 3 2000 units daily and continue yearly surveillance      HPI: Imelda Mcallister is a 80 y o  male who is here for Follow-up    Since our last visit, there has been no ER visits or hospitilizations  Patient currently has no complaints at this time and is feeling well  Patient denies any chest pain, shortness of breath and swelling   The last blood work was done on  January 30th, which we have reviewed together  he currently denies any chest pain or shortness of Breath no fevers or chills nausea vomiting diarrhea or constipation, he his wife was concerned about weight loss, over the last year he has lost about 5-6 lb, he has had a CT scan of the chest without contrast for surveillance of an aneurysm which did not show any malignancy, he did have an enlarged renal cyst however  his urine output has been stable, and his blood pressures have been well controlled at home around 120/80 and sometimes lower according to his wife  He is on amlodipine 5 mg daily and is tolerating this well  His office blood pressure upon repeat was better at around 1 28 over 83    ROS:   All the systems were reviewed and were negative except as documented on the HPI  Allergies:  Other    Medications:   Current Outpatient Prescriptions:     amLODIPine (NORVASC) 5 mg tablet, Take 1 tablet by mouth daily, Disp: , Rfl:     aspirin (ECOTRIN LOW STRENGTH) 81 mg EC tablet, Take 81 mg by mouth 2 (two) times a week, Disp: , Rfl:     atorvastatin (LIPITOR) 20 mg tablet, Take 1 tablet by mouth daily, Disp: , Rfl:     Cholecalciferol (VITAMIN D3) 2000 units capsule, Take by mouth daily, Disp: , Rfl:     Inulin (METAMUCIL CLEAR & NATURAL PO), Take by mouth, Disp: , Rfl:     Linaclotide (LINZESS) 145 MCG CAPS, Take 1 tablet by mouth every other day, Disp: , Rfl:     Multiple Vitamins-Minerals (ONE DAILY MENS 50+/LYCOPENE) TABS, Take 1 tablet by mouth daily, Disp: , Rfl:     Multiple Vitamins-Minerals (PRESERVISION AREDS) TABS, Take 1 tablet by mouth every 12 (twelve) hours, Disp: , Rfl:     omeprazole (PriLOSEC) 40 MG capsule, Take by mouth, Disp: , Rfl:     oxyCODONE-acetaminophen (PERCOCET) 5-325 mg per tablet, , Disp: , Rfl:     polyethylene glycol (MIRALAX) powder, Take by mouth, Disp: , Rfl:     tamsulosin (FLOMAX) 0 4 mg, Take 1 capsule by mouth daily, Disp: , Rfl: Past Medical History:   Diagnosis Date    Hard of hearing     IBS (irritable bowel syndrome)     with constipation     Past Surgical History:   Procedure Laterality Date    APPENDECTOMY      CATARACT EXTRACTION Bilateral     COLONOSCOPY      HERNIA REPAIR      x3    NE REPAIR RECURR INGUIN UNA,REDUCIBL Left 11/15/2017    Procedure: RECURRENT INGUNIAL HERNIA REPAIR;  Surgeon: Soniya Flynn MD;  Location: [facility];  Service: General     History reviewed  No pertinent family history  reports that he has quit smoking  He has never used smokeless tobacco  He reports that he does not drink alcohol or use drugs  Physical Exam:   Vitals:    02/08/18 1051   BP: 142/76   Weight: 62 1 kg (136 lb 12 8 oz)   Height: 5' 3 54" (1 614 m)    repeat blood pressure was 128/83    Body mass index is 23 82 kg/m²      General: conscious, cooperative, in not acute distress  Eyes: conjunctivae pink, anicteric sclerae  ENT: lips and mucous membranes moist  Neck: supple, no JVD  Chest: clear breath sounds bilateral, no crackles, ronchus or wheezings  CVS: distinct S1 & S2, normal rate, regular rhythm  Abdomen: soft, non-tender, non-distended, normoactive bowel sounds  Extremities: no edema of both legs  Skin: no rash  Neuro: awake, alert, oriented      Lab Results:    Results for orders placed or performed in visit on 01/30/18   Comprehensive metabolic panel   Result Value Ref Range    Sodium 139 136 - 145 mmol/L    Potassium 4 1 3 5 - 5 3 mmol/L    Chloride 102 100 - 108 mmol/L    CO2 29 21 - 32 mmol/L    Anion Gap 8 4 - 13 mmol/L    BUN 21 5 - 25 mg/dL    Creatinine 1 51 (H) 0 60 - 1 30 mg/dL    Glucose, Fasting 118 (H) 65 - 99 mg/dL    Calcium 8 5 8 3 - 10 1 mg/dL    AST 18 5 - 45 U/L    ALT 23 12 - 78 U/L    Alkaline Phosphatase 101 46 - 116 U/L    Total Protein 7 6 6 4 - 8 2 g/dL    Albumin 3 6 3 5 - 5 0 g/dL    Total Bilirubin 1 16 (H) 0 20 - 1 00 mg/dL    eGFR 42 ml/min/1 73sq m   CBC and differential   Result Value Ref Range    WBC 7 14 4 31 - 10 16 Thousand/uL    RBC 4 58 3 88 - 5 62 Million/uL    Hemoglobin 13 5 12 0 - 17 0 g/dL    Hematocrit 42 2 36 5 - 49 3 %    MCV 92 82 - 98 fL    MCH 29 5 26 8 - 34 3 pg    MCHC 32 0 31 4 - 37 4 g/dL    RDW 14 7 11 6 - 15 1 %    MPV 9 2 8 9 - 12 7 fL    Platelets 301 712 - 651 Thousands/uL    nRBC 0 /100 WBCs    Neutrophils Relative 57 43 - 75 %    Lymphocytes Relative 29 14 - 44 %    Monocytes Relative 8 4 - 12 %    Eosinophils Relative 5 0 - 6 %    Basophils Relative 1 0 - 1 %    Neutrophils Absolute 4 09 1 85 - 7 62 Thousands/µL    Lymphocytes Absolute 2 04 0 60 - 4 47 Thousands/µL    Monocytes Absolute 0 57 0 17 - 1 22 Thousand/µL    Eosinophils Absolute 0 36 0 00 - 0 61 Thousand/µL    Basophils Absolute 0 06 0 00 - 0 10 Thousands/µL   Magnesium   Result Value Ref Range    Magnesium 2 2 1 6 - 2 6 mg/dL   Phosphorus   Result Value Ref Range    Phosphorus 3 2 2 3 - 4 1 mg/dL   PTH, intact   Result Value Ref Range     9 (H) 14 0 - 72 0 pg/mL   Uric acid   Result Value Ref Range    Uric Acid 5 1 4 2 - 8 0 mg/dL   Urinalysis with microscopic   Result Value Ref Range    Clarity, UA Clear     Color, UA Yellow     Specific [place], UA 1 019 1 003 - 1 030    pH, UA 6 0 4 5 - 8 0    Glucose, UA Negative Negative mg/dl    Ketones, UA Negative Negative mg/dl    Blood, UA Negative Negative    Protein, UA Trace (A) Negative mg/dl    Nitrite, UA Negative Negative    Bilirubin, UA Negative Negative    Urobilinogen, UA 0 2 0 2, 1 0 E U /dl E U /dl    Leukocytes, UA Trace (A) Negative    WBC, UA 4-10 (A) None Seen, 0-5, 5-55, 5-65 /hpf    RBC, UA 2-4 (A) None Seen, 0-5 /hpf    Hyaline Casts, UA None Seen None Seen /lpf    Bacteria, UA None Seen None Seen, Occasional /hpf    Epithelial Cells None Seen None Seen, Occasional /hpf   Protein / creatinine ratio, urine   Result Value Ref Range    Creatinine, Ur 141 0 mg/dL    Protein Urine Random 30 mg/dL    Prot/Creat Ratio, Ur 0 21 (H) 0 00 - 0 10 Portions of the record may have been created with voice recognition software  Occasional wrong word or "sound a like" substitutions may have occurred due to the inherent limitations of voice recognition software  Read the chart carefully and recognize, using context, where substitutions have occurred  If you have any questions, please contact the dictating provider

## 2018-02-08 NOTE — PATIENT INSTRUCTIONS
Mr Bebeto Howell were seen today for chronic kidney disease  At this point, we discussed and I recommend the followin  Your kidney function is stable, your creatinine which is your kidney number is 1 5 and is stable,  Continue taking your current medication, remember to stay hydrated drink at least 6  8 oz cups of fluid a day  2  You do have renal cysts, we will repeat a renal ultrasound to make sure the size of the cysts are not worsening   3  Continue your vitamin D3 2000 units daily   4  Continue protein intake (ensure) and if  You continue to lose weight, please notify your physicians  5  If you have any questions or concerns please do not hesitate to contact us, otherwise at will see you in 1 year with surveillance blood work at that time    Chronic Kidney Disease   WHAT YOU NEED TO KNOW:   Chronic kidney disease (CKD) is the gradual and permanent loss of kidney function  It is also called chronic kidney failure, or chronic renal insufficiency  Normally, the kidneys remove fluid, chemicals, and waste from your blood  These wastes are turned into urine by your kidneys  CKD may worsen over time and lead to kidney failure  DISCHARGE INSTRUCTIONS:   Return to the emergency department if:   · You are confused and very drowsy  · You have a seizure  · You have shortness of breath  Contact your healthcare provider if:   · You suddenly gain or lose more weight than your healthcare provider has told you is okay  · You have itchy skin or a rash  · You urinate more or less than you normally do  · You have blood in your urine  · You have nausea and repeated vomiting  · You have fatigue or muscle weakness  · You have hiccups that will not stop  · You have questions or concerns about your condition or care  Medicines:   · Medicines  may be given to decrease blood pressure and get rid of extra fluid   You may also receive medicine to manage health conditions that may occur with CKD, such as anemia, diabetes, and heart disease  · Take your medicine as directed  Contact your healthcare provider if you think your medicine is not helping or if you have side effects  Tell him or her if you are allergic to any medicine  Keep a list of the medicines, vitamins, and herbs you take  Include the amounts, and when and why you take them  Bring the list or the pill bottles to follow-up visits  Carry your medicine list with you in case of an emergency  Follow up with your healthcare provider as directed: You will need to return for tests to monitor your kidney function  You may also be referred to a kidney specialist  Write down your questions so you remember to ask them during your visits  Manage other health conditions: Follow your healthcare provider's directions on how to manage diabetes, high blood pressure, and heart disease  These conditions can make CKD worse  Talk to your healthcare provider before you take over-the-counter medicine  Medicines such as NSAIDs, stomach medicine, or laxatives may harm your kidneys  Weigh yourself daily:  Ask your healthcare provider what your weight should be  Ask how much liquid you should drink each day  CKD may cause you to gain or lose weight rapidly  Weigh yourself every day  Write down your weight, how much liquid you drink or eat, and how much you urinate each day  Contact your healthcare provider if your weight is higher or lower than it should be  Manage CKD:   · Maintain a healthy weight  Ask your healthcare provider how much you should weigh  Ask him to help you create a weight loss plan if you are overweight  · Exercise 30 to 60 minutes a day, 4 to 7 times a week, or as directed  Ask about the best exercise plan for you  Regular exercise can help you manage CKD, high blood pressure, and diabetes  · Follow your healthcare provider's advice about what to eat and drink    He may tell you to eat food low in sodium (salt), potassium, phosphorus, or protein  You may need to see a dietitian if you need help planning meals  Ask how much liquid to drink each day and which liquids are best for you  · Limit alcohol  Ask how much alcohol is safe for you to drink  A drink of alcohol is 12 ounces of beer, 5 ounces of wine, or 1½ ounces of liquor  · Do not smoke  Nicotine and other chemicals in cigarettes and cigars can cause lung and kidney damage  Ask your healthcare provider for information if you currently smoke and need help to quit  E-cigarettes or smokeless tobacco still contain nicotine  Talk to your healthcare provider before you use these products  · Ask your healthcare provider if you need vaccines  Infections such as pneumonia, influenza, and hepatitis can be more harmful or more likely to occur in a person who has CKD  Vaccines reduce your risk of infection with these viruses  © 2017 [address]  Information is for End User's use only and may not be sold, redistributed or otherwise used for commercial purposes  All illustrations and images included in CareNotes® are the copyrighted property of [employer]  or [address]  The above information is an  only  It is not intended as medical advice for individual conditions or treatments  Talk to your doctor, nurse or pharmacist before following any medical regimen to see if it is safe and effective for you

## 2018-02-14 ENCOUNTER — HOSPITAL ENCOUNTER (OUTPATIENT)
Dept: RADIOLOGY | Age: 83
Discharge: HOME | End: 2018-02-14
Payer: COMMERCIAL

## 2018-02-14 PROCEDURE — 76770 US EXAM ABDO BACK WALL COMP: CPT

## 2018-02-19 ENCOUNTER — TELEPHONE (OUTPATIENT)
Dept: NEPHROLOGY | Facility: CLINIC | Age: 83
End: 2018-02-19

## 2018-02-19 NOTE — TELEPHONE ENCOUNTER
----- Message from Mary Padron DO sent at 2/18/2018 10:07 PM EST -----  Can you let him know that I reviewed his renal us and its stable  His renal cysts are stable in size  Will continue to monitor thanks

## 2018-02-19 NOTE — PROGRESS NOTES
Can you let him know that I reviewed his renal us and its stable  His renal cysts are stable in size  Will continue to monitor thanks

## 2018-02-19 NOTE — TELEPHONE ENCOUNTER
I called and spoke with Loren Sol' wife  She is aware of his US results  He will f/u yearly, 2/2019  No other concerns at this time

## 2018-03-20 ENCOUNTER — APPOINTMENT (OUTPATIENT)
Dept: LAB | Age: 83
End: 2018-03-20
Payer: COMMERCIAL

## 2018-03-20 ENCOUNTER — TRANSCRIBE ORDERS (OUTPATIENT)
Dept: ADMINISTRATIVE | Age: 83
End: 2018-03-20

## 2018-03-20 DIAGNOSIS — N13.8 ENLARGED PROSTATE WITH URINARY OBSTRUCTION: ICD-10-CM

## 2018-03-20 DIAGNOSIS — N40.1 ENLARGED PROSTATE WITH URINARY OBSTRUCTION: ICD-10-CM

## 2018-03-20 DIAGNOSIS — N40.1 ENLARGED PROSTATE WITH URINARY OBSTRUCTION: Primary | ICD-10-CM

## 2018-03-20 DIAGNOSIS — N13.8 ENLARGED PROSTATE WITH URINARY OBSTRUCTION: Primary | ICD-10-CM

## 2018-03-20 LAB
BUN SERPL-MCNC: 26 MG/DL (ref 5–25)
CREAT SERPL-MCNC: 1.57 MG/DL (ref 0.6–1.3)
GFR SERPL CREATININE-BSD FRML MDRD: 40 ML/MIN/1.73SQ M
PSA SERPL-MCNC: 2.7 NG/ML (ref 0–4)

## 2018-03-20 PROCEDURE — G0103 PSA SCREENING: HCPCS

## 2018-03-20 PROCEDURE — 36415 COLL VENOUS BLD VENIPUNCTURE: CPT

## 2018-03-20 PROCEDURE — 84520 ASSAY OF UREA NITROGEN: CPT

## 2018-03-20 PROCEDURE — 82565 ASSAY OF CREATININE: CPT

## 2018-05-03 ENCOUNTER — TRANSCRIBE ORDERS (OUTPATIENT)
Dept: ADMINISTRATIVE | Age: 83
End: 2018-05-03

## 2018-05-03 ENCOUNTER — APPOINTMENT (OUTPATIENT)
Dept: RADIOLOGY | Age: 83
End: 2018-05-03
Payer: COMMERCIAL

## 2018-05-03 DIAGNOSIS — M25.551 HIP PAIN, RIGHT: Primary | ICD-10-CM

## 2018-05-03 DIAGNOSIS — M25.551 HIP PAIN, RIGHT: ICD-10-CM

## 2018-05-03 PROCEDURE — 73502 X-RAY EXAM HIP UNI 2-3 VIEWS: CPT

## 2018-05-04 ENCOUNTER — OFFICE VISIT (OUTPATIENT)
Dept: OBGYN CLINIC | Facility: OTHER | Age: 83
End: 2018-05-04
Payer: COMMERCIAL

## 2018-05-04 VITALS
DIASTOLIC BLOOD PRESSURE: 96 MMHG | HEART RATE: 70 BPM | SYSTOLIC BLOOD PRESSURE: 179 MMHG | WEIGHT: 134 LBS | BODY MASS INDEX: 22.88 KG/M2 | HEIGHT: 64 IN

## 2018-05-04 DIAGNOSIS — S32.591A FRACTURE OF MULTIPLE PUBIC RAMI, RIGHT, CLOSED, INITIAL ENCOUNTER (HCC): Primary | ICD-10-CM

## 2018-05-04 PROCEDURE — 99203 OFFICE O/P NEW LOW 30 MIN: CPT | Performed by: INTERNAL MEDICINE

## 2018-05-04 NOTE — PROGRESS NOTES
Assessment/Plan:  Assessment/Plan   Diagnoses and all orders for this visit:    Fracture of multiple pubic rami, right, closed, initial encounter [facility])  -     Wheelchair      Have started him on nonweightbearing for now and he will follow up for re-evaluation in 2 weeks  I will repeat his x-ray during next encounter  If there is any sign of healing process, I will most likely start him on weight-bearing as tolerated  Subjective:   Patient ID: Rashad Beltran is a 80 y o  male  HPI    Mr Polo Acevedo is a pleasant 19-year-old male who presents with his wife and son for initial evaluation of acute right hip pain which she develops status post fall last Thursday  He has been having pain since then  He subsequently saw his PMD who obtain  Hip x-ray which revealed a pelvic fracture  He was referred to our service for further evaluation and management  On today's presentation, he reports that his pain is primarily in the hip and groin area  He denies any numbness or tingling  The following portions of the patient's history were reviewed and updated as appropriate: allergies, current medications, past family history, past medical history, past social history, past surgical history and problem list     Review of Systems  Review of Systems   Constitutional: Negative  HENT: Negative  Eyes: Negative  Respiratory: Negative  Cardiovascular: Negative  Musculoskeletal:        As per history of present illness   Skin: Negative  Neurological:  Negative   Psychiatric/Behavioral: Negative  Objective:  Vitals:    05/04/18 1029   BP: (!) 179/96   BP Location: Right arm   Patient Position: Sitting   Cuff Size: Standard   Pulse: 70   Weight: 60 8 kg (134 lb)   Height: 5' 4" (1 626 m)       Right Hip Exam     Tenderness   Right hip tenderness location: Right-sided Pubic rami tenderness  Mild disuse tuberosity tenderness  Negative trochanteric tenderness      Muscle Strength   Right hip normal muscle strength: The patient is able to flex, extend, internal, and externally rotate his hip against gravity  Other   Erythema: absent  Sensation: normal  Pulse: present            Physical Exam  Constitutional: Oriented to person, place, and time  Well-developed and well-nourished  HENT:   Head: Normocephalic and atraumatic  Eyes: Conjunctivae are normal    Cardiovascular: Normal rate  Pulmonary/Chest: Effort normal    Neurological: Alert and oriented to person, place, and time  Skin: Skin is warm and dry  Psychiatric: Normal mood and affect  I have personally reviewed pertinent films in PACS and my interpretation is I reviewed his x-ray and is significant for a  Right superior and inferior pubic rami fracture  Prosper Thompson

## 2018-05-08 ENCOUNTER — TELEPHONE (OUTPATIENT)
Dept: OBGYN CLINIC | Facility: HOSPITAL | Age: 83
End: 2018-05-08

## 2018-05-08 NOTE — TELEPHONE ENCOUNTER
Patients wife is calling stating that she would like to talk to the doctor regarding the patients fractures in his pelvis  He is still having pain and she wants to speak to him about it      Tico pt

## 2018-05-08 NOTE — TELEPHONE ENCOUNTER
I called the patient's wife back and spoke to her  She reports the patient is wheelchair was delivered yesterday  She also inquired if patient is allowed to sit on the couch and also as if patient can go down 1 step in the front of the house to get on the wheelchair when they are going outside because that and have a ramp at home  My response to both of her enquiring is yes, patient is allowed to sit on the couch and he can standing go down a single step to given a wheelchair  Besides, that on have a ramp  On a side note, she also reports that Mr Saadia Delgadillo reportedly [address] off the bed yesterday  However, the comforter was reportedly around him so he did not sustained a mariluz fall  He also did not develop any acute pain exacerbation  I explained to her that if patient's pain has increased since the supposed sleep of from the bed, she should bring him back to the office or taking to the closest emergency room or urgent care for re-evaluation to make sure that his fracture has not displaced  She expressed understanding but states that her  denies any pain following the incident

## 2018-05-11 ENCOUNTER — APPOINTMENT (OUTPATIENT)
Dept: RADIOLOGY | Age: 83
End: 2018-05-11
Payer: COMMERCIAL

## 2018-05-11 ENCOUNTER — OFFICE VISIT (OUTPATIENT)
Dept: URGENT CARE | Age: 83
End: 2018-05-11
Payer: COMMERCIAL

## 2018-05-11 VITALS
TEMPERATURE: 97.8 F | RESPIRATION RATE: 18 BRPM | BODY MASS INDEX: 23.9 KG/M2 | HEART RATE: 55 BPM | SYSTOLIC BLOOD PRESSURE: 170 MMHG | OXYGEN SATURATION: 95 % | DIASTOLIC BLOOD PRESSURE: 90 MMHG | HEIGHT: 64 IN | WEIGHT: 140 LBS

## 2018-05-11 DIAGNOSIS — S32.9XXA CLOSED NONDISPLACED FRACTURE OF PELVIS, UNSPECIFIED PART OF PELVIS, INITIAL ENCOUNTER (HCC): Primary | ICD-10-CM

## 2018-05-11 DIAGNOSIS — R10.2 PAIN IN PELVIS: ICD-10-CM

## 2018-05-11 PROCEDURE — 99213 OFFICE O/P EST LOW 20 MIN: CPT | Performed by: FAMILY MEDICINE

## 2018-05-11 PROCEDURE — S9088 SERVICES PROVIDED IN URGENT: HCPCS | Performed by: FAMILY MEDICINE

## 2018-05-11 PROCEDURE — 73502 X-RAY EXAM HIP UNI 2-3 VIEWS: CPT

## 2018-05-11 NOTE — PROGRESS NOTES
[employer] [facility]        NAME: Jovani Brice is a 80 y o  male  : 1933    MRN: [id number]  DATE: May 11, 2018  TIME: 9:34 PM    Assessment and Plan   Closed nondisplaced fracture of pelvis, unspecified part of pelvis, initial encounter ([facility] ) [S32  9XXA]  1  Closed nondisplaced fracture of pelvis, unspecified part of pelvis, initial encounter ([facility] )     2  Pain in pelvis  XR hip/pelv 2-3 vws right if performed    CANCELED: XR hip/pelv 2-3 vws right if performed         Patient Instructions     Patient Instructions   No significant change on preliminary read of xray  Will call and request read  Will call you later this evening with results  Continue non weight bearing and Tylenol as needed for pain  Follow up with ortho  Go to ER with worsening symptoms  2018 0730 pm - Wife aware of stable xray reports  Follow up with Ortho if pain persist over the weekend  Go to ER with worsening symptoms  Continue non weight bearing and Tylenol for pain  Chief Complaint     Chief Complaint   Patient presents with    Pelvic Pain     For repeat x-ray per ortho s/p recent fall  Had fallen 2 weeks ago and has fracture R pelvis in 3 locations  Rolled out of bed 2 days ago onto L side but awoke today with worse pain upon standing in R pelvis/groin area  History of Present Illness   Jovani Brice presents to the clinic c/o    This is a 80year old female here today with complaints of worsening right pelvic pain  He sustained fall on   He was then seen by PCP who did hip xray  This revealed fracture at the right superior and inferior pubic rami  He was seen by Ortho on 2018  He was placed on nonweight bearing restrictions and follow up 2 weeks after  Several days ago he rolled off bed in the comforter  He did not have any increased pain at that time  Today he woke up with increased groin and hip pain  He stopped taking Tylenol yesterday    He denies any numbness or tingling  He has been using wheelchair  Ortho recommend he have repeat xray  Review of Systems   Review of Systems   Constitutional: Negative  Respiratory: Negative  Cardiovascular: Negative  Musculoskeletal: Positive for arthralgias  Neurological: Negative  Psychiatric/Behavioral: Negative  Current Medications     Long-Term Prescriptions   Medication Sig Dispense Refill    amLODIPine (NORVASC) 5 mg tablet Take 1 tablet by mouth daily      aspirin (ECOTRIN LOW STRENGTH) 81 mg EC tablet Take 81 mg by mouth 2 (two) times a week      atorvastatin (LIPITOR) 20 mg tablet Take 1 tablet by mouth daily      Cholecalciferol (VITAMIN D3) 2000 units capsule Take by mouth daily      Linaclotide (LINZESS) 145 MCG CAPS Take 1 tablet by mouth every other day      omeprazole (PriLOSEC) 40 MG capsule Take by mouth      tamsulosin (FLOMAX) 0 4 mg Take 1 capsule by mouth daily         Current Allergies     Allergies as of 05/11/2018 - Reviewed 05/11/2018   Allergen Reaction Noted    Other Rash 06/08/2015            The following portions of the patient's history were reviewed and updated as appropriate: allergies, current medications, past family history, past medical history, past social history, past surgical history and problem list     Objective   /90 (BP Location: Left arm, Patient Position: Sitting, Cuff Size: Standard)   Pulse 55   Temp 97 8 °F (36 6 °C) (Oral)   Resp 18   Ht 5' 4" (1 626 m)   Wt 63 5 kg (140 lb)   SpO2 95%   BMI 24 03 kg/m²        Physical Exam     Physical Exam   Constitutional: He is oriented to person, place, and time  He appears well-developed  Neck: Normal range of motion  Neck supple  Cardiovascular: Normal rate, regular rhythm and normal heart sounds  Pulmonary/Chest: Effort normal    Musculoskeletal:   TTP over the right groin  No TTP over hip joint  Neurological: He is alert and oriented to person, place, and time     Psychiatric: He has a normal mood and affect  His behavior is normal      Right pelvis/ hip: stable xray report

## 2018-05-11 NOTE — TELEPHONE ENCOUNTER
Patient's wife is calling to speak to the doctor again  She wouldn't give any more information than that

## 2018-05-11 NOTE — PATIENT INSTRUCTIONS
No significant change on preliminary read of xray  Will call and request read  Will call you later this evening with results  Continue non weight bearing and Tylenol as needed for pain  Follow up with ortho  Go to ER with worsening symptoms  05/11/2018 0730 pm - Wife aware of stable xray reports  Follow up with Ortho if pain persist over the weekend  Go to ER with worsening symptoms  Continue non weight bearing and Tylenol for pain

## 2018-05-18 ENCOUNTER — APPOINTMENT (OUTPATIENT)
Dept: RADIOLOGY | Facility: OTHER | Age: 83
End: 2018-05-18
Payer: COMMERCIAL

## 2018-05-18 ENCOUNTER — OFFICE VISIT (OUTPATIENT)
Dept: OBGYN CLINIC | Facility: OTHER | Age: 83
End: 2018-05-18
Payer: COMMERCIAL

## 2018-05-18 VITALS — DIASTOLIC BLOOD PRESSURE: 96 MMHG | HEIGHT: 64 IN | HEART RATE: 61 BPM | SYSTOLIC BLOOD PRESSURE: 197 MMHG

## 2018-05-18 DIAGNOSIS — M25.559 ARTHRALGIA OF HIP, UNSPECIFIED LATERALITY: ICD-10-CM

## 2018-05-18 DIAGNOSIS — S32.591D CLOSED FRACTURE OF MULTIPLE PUBIC RAMI, RIGHT, WITH ROUTINE HEALING, SUBSEQUENT ENCOUNTER: Primary | ICD-10-CM

## 2018-05-18 PROCEDURE — 73502 X-RAY EXAM HIP UNI 2-3 VIEWS: CPT

## 2018-05-18 PROCEDURE — 99213 OFFICE O/P EST LOW 20 MIN: CPT | Performed by: INTERNAL MEDICINE

## 2018-05-18 NOTE — PROGRESS NOTES
Assessment/Plan:  Assessment/Plan   Diagnoses and all orders for this visit:    Closed fracture of multiple pubic rami, right, with routine healing, subsequent encounter    Arthralgia of hip, unspecified laterality  -     XR hip/pelv 2-3 vws right if performed; Future      Patient's fracture has continued to maintain anatomical structure  There is no significant displacement  I have recommended weight-bearing as tolerated and follow up for re-evaluation in 1 month  We will repeat his right hip x-ray during that encounter  Patient was also found to have elevated her blood pressure today  On further discussion, he reports that he did not take his blood pressure medication today  I personally contacted his doctor's office (Dr Ac Rodas) but there was no answer  We left a message  Dr Ac Rodas is wife subsequently returned a phone call  We informed her of patient's elevated blood pressure  She indicated that the patient will be seen by Dr Ac Rodas  Patient was advised to call and return to the clinic sooner or go to the closest emergency room if he develops any symptom exacerbation  This document was recorded using voice recognition software and errors may be noted  Subjective:   Patient ID: Jonathon Ricci is a 80 y o  male  HPI    Mr Lory Kuhn presents with his wife and son for follow-up re-evaluation of his right superior and inferior pubic rami fracture  He has been using wheelchair for ambulation  His wife reports the patient has been using Tylenol because he has been experiencing intermittent pain  However, patient reports that he has been taking the Tylenol because his wife is given to him and not necessarily because of pain  Otherwise, no other complaints  Review of Systems  Review of Systems   Constitutional: Negative  HENT: Negative  Eyes: Negative  Respiratory: Negative  Cardiovascular: Negative      Musculoskeletal:        As per history of present illness   Skin: Negative  Neurological:  Negative   Psychiatric/Behavioral: Negative  Objective:  Ortho Exam    Physical Exam  Constitutional: Oriented to person, place, and time  Well-developed and well-nourished  HENT:   Head: Normocephalic and atraumatic  Eyes: Conjunctivae are normal    Cardiovascular: Normal rate  Pulmonary/Chest: Effort normal    Neurological: Alert and oriented to person, place, and time  Skin: Skin is warm and dry  Psychiatric: Normal mood and affect  I have personally reviewed pertinent films in PACS and my interpretation is Repeat right hip x-ray done today shows a persistent anatomically maintained superior and inferior pubic rami fracture line  Migue Bush

## 2018-06-14 ENCOUNTER — HOSPITAL ENCOUNTER (EMERGENCY)
Facility: HOSPITAL | Age: 83
Discharge: HOME | End: 2018-06-14
Attending: EMERGENCY MEDICINE
Payer: COMMERCIAL

## 2018-06-14 ENCOUNTER — APPOINTMENT (OUTPATIENT)
Dept: RADIOLOGY | Facility: OTHER | Age: 83
End: 2018-06-14
Payer: COMMERCIAL

## 2018-06-14 ENCOUNTER — OFFICE VISIT (OUTPATIENT)
Dept: OBGYN CLINIC | Facility: OTHER | Age: 83
End: 2018-06-14
Payer: COMMERCIAL

## 2018-06-14 ENCOUNTER — APPOINTMENT (EMERGENCY)
Dept: RADIOLOGY | Facility: HOSPITAL | Age: 83
End: 2018-06-14
Payer: COMMERCIAL

## 2018-06-14 VITALS
OXYGEN SATURATION: 97 % | RESPIRATION RATE: 20 BRPM | HEART RATE: 84 BPM | SYSTOLIC BLOOD PRESSURE: 154 MMHG | HEIGHT: 64 IN | WEIGHT: 135 LBS | DIASTOLIC BLOOD PRESSURE: 93 MMHG | TEMPERATURE: 98.4 F | BODY MASS INDEX: 23.05 KG/M2

## 2018-06-14 VITALS
DIASTOLIC BLOOD PRESSURE: 86 MMHG | SYSTOLIC BLOOD PRESSURE: 156 MMHG | HEART RATE: 69 BPM | BODY MASS INDEX: 23.05 KG/M2 | HEIGHT: 64 IN | WEIGHT: 135 LBS

## 2018-06-14 DIAGNOSIS — S32.591D CLOSED FRACTURE OF MULTIPLE PUBIC RAMI, RIGHT, WITH ROUTINE HEALING, SUBSEQUENT ENCOUNTER: Primary | ICD-10-CM

## 2018-06-14 DIAGNOSIS — R53.1 GENERALIZED WEAKNESS: Primary | ICD-10-CM

## 2018-06-14 DIAGNOSIS — M25.551 PAIN IN RIGHT HIP: ICD-10-CM

## 2018-06-14 DIAGNOSIS — M16.11 PRIMARY OSTEOARTHRITIS OF ONE HIP, RIGHT: ICD-10-CM

## 2018-06-14 DIAGNOSIS — S32.591D CLOSED FRACTURE OF MULTIPLE PUBIC RAMI, RIGHT, WITH ROUTINE HEALING, SUBSEQUENT ENCOUNTER: ICD-10-CM

## 2018-06-14 LAB
ANION GAP SERPL CALCULATED.3IONS-SCNC: 8 MMOL/L (ref 4–13)
BASOPHILS # BLD AUTO: 0.08 THOUSANDS/ΜL (ref 0–0.1)
BASOPHILS NFR BLD AUTO: 1 % (ref 0–1)
BUN SERPL-MCNC: 22 MG/DL (ref 5–25)
CALCIUM SERPL-MCNC: 9.2 MG/DL (ref 8.3–10.1)
CHLORIDE SERPL-SCNC: 102 MMOL/L (ref 100–108)
CO2 SERPL-SCNC: 28 MMOL/L (ref 21–32)
CREAT SERPL-MCNC: 1.4 MG/DL (ref 0.6–1.3)
EOSINOPHIL # BLD AUTO: 0.29 THOUSAND/ΜL (ref 0–0.61)
EOSINOPHIL NFR BLD AUTO: 4 % (ref 0–6)
ERYTHROCYTE [DISTWIDTH] IN BLOOD BY AUTOMATED COUNT: 14.4 % (ref 11.6–15.1)
GFR SERPL CREATININE-BSD FRML MDRD: 45 ML/MIN/1.73SQ M
GLUCOSE SERPL-MCNC: 84 MG/DL (ref 65–140)
HCT VFR BLD AUTO: 40.8 % (ref 36.5–49.3)
HGB BLD-MCNC: 13.1 G/DL (ref 12–17)
IMM GRANULOCYTES # BLD AUTO: 0.03 THOUSAND/UL (ref 0–0.2)
IMM GRANULOCYTES NFR BLD AUTO: 1 % (ref 0–2)
LYMPHOCYTES # BLD AUTO: 1.75 THOUSANDS/ΜL (ref 0.6–4.47)
LYMPHOCYTES NFR BLD AUTO: 27 % (ref 14–44)
MCH RBC QN AUTO: 29.6 PG (ref 26.8–34.3)
MCHC RBC AUTO-ENTMCNC: 32.1 G/DL (ref 31.4–37.4)
MCV RBC AUTO: 92 FL (ref 82–98)
MONOCYTES # BLD AUTO: 0.59 THOUSAND/ΜL (ref 0.17–1.22)
MONOCYTES NFR BLD AUTO: 9 % (ref 4–12)
NEUTROPHILS # BLD AUTO: 3.87 THOUSANDS/ΜL (ref 1.85–7.62)
NEUTS SEG NFR BLD AUTO: 58 % (ref 43–75)
NRBC BLD AUTO-RTO: 0 /100 WBCS
PLATELET # BLD AUTO: 238 THOUSANDS/UL (ref 149–390)
PMV BLD AUTO: 9.3 FL (ref 8.9–12.7)
POTASSIUM SERPL-SCNC: 4.2 MMOL/L (ref 3.5–5.3)
RBC # BLD AUTO: 4.42 MILLION/UL (ref 3.88–5.62)
SODIUM SERPL-SCNC: 138 MMOL/L (ref 136–145)
TROPONIN I SERPL-MCNC: <0.02 NG/ML
TSH SERPL DL<=0.05 MIU/L-ACNC: 3.19 UIU/ML (ref 0.36–3.74)
WBC # BLD AUTO: 6.61 THOUSAND/UL (ref 4.31–10.16)

## 2018-06-14 PROCEDURE — 84443 ASSAY THYROID STIM HORMONE: CPT | Performed by: EMERGENCY MEDICINE

## 2018-06-14 PROCEDURE — 73502 X-RAY EXAM HIP UNI 2-3 VIEWS: CPT

## 2018-06-14 PROCEDURE — 71046 X-RAY EXAM CHEST 2 VIEWS: CPT

## 2018-06-14 PROCEDURE — 80048 BASIC METABOLIC PNL TOTAL CA: CPT | Performed by: EMERGENCY MEDICINE

## 2018-06-14 PROCEDURE — 99285 EMERGENCY DEPT VISIT HI MDM: CPT

## 2018-06-14 PROCEDURE — 36415 COLL VENOUS BLD VENIPUNCTURE: CPT | Performed by: EMERGENCY MEDICINE

## 2018-06-14 PROCEDURE — 93005 ELECTROCARDIOGRAM TRACING: CPT

## 2018-06-14 PROCEDURE — 99213 OFFICE O/P EST LOW 20 MIN: CPT | Performed by: INTERNAL MEDICINE

## 2018-06-14 PROCEDURE — 84484 ASSAY OF TROPONIN QUANT: CPT | Performed by: EMERGENCY MEDICINE

## 2018-06-14 PROCEDURE — 85025 COMPLETE CBC W/AUTO DIFF WBC: CPT | Performed by: EMERGENCY MEDICINE

## 2018-06-14 NOTE — ED ATTENDING ATTESTATION
Tj Coon DO, saw and evaluated the patient  I have discussed the patient with the resident/non-physician practitioner and agree with the resident's/non-physician practitioner's findings, Plan of Care, and MDM as documented in the resident's/non-physician practitioner's note, except where noted  All available labs and Radiology studies were reviewed  At this point I agree with the current assessment done in the Emergency Department  I have conducted an independent evaluation of this patient a history and physical is as follows:    Patient presents via EMS after feeling weak, dyspneic and shaky after working in his garden this afternoon  When he was sitting inside, he felt waxing and waning a hot and cold symptoms  The symptoms lasted for almost an hour, prompting his wife to call EMS  In the ED, he states he feels back to normal and wants to go home  He has a h/o multiple pelvic fractures incurred in March for which he has healed and is ready for physical therapy  He was wheelchair bound for the almost 2 months and has progressed to a walker and now to ambulating independently  He saw his orthopedic surgeon in follow-up this afternoon without difficulty  No h/o DVT or PE  He denies other associated cardiac symptoms  His medical record indicates a h/o secondary hyperparathyroidism of which he knows nothing  He denies h/o surgery or other problems with his thyroid  He has a h/o TIA which presented as facial paresthesias and droop  His wife denies seeing this today  No recent travel or sick contacts  ROS: Denies f/c, HA, CP, SOB, abdominal pain, n/v/d  12 system ROS o/w negative       PE: NAD, appears comfortable, alert; PERRL, EOMI; MMM, no posterior oropharyngeal exudate, edema or erythema; HRR, no murmur, monitor shows NSR at 78 bpm; lungs CTA w/o w/r/r, POx 97% on RA (nl), no positional changes to symptoms or orthostatic symptoms; abdomen s/nt/nd, nl BS in all 4 quadrant; moderate, nonpitting LE edema, no calf TTP, FROM extremities x4; skin p/w/d; CNs GI/NF, oriented  DDx:  [place] - mild heat exhaustion, dehydration, abnormal electrolytes, anemia, abnormal thyroid function, dysrhythmia, doubt ACS/MI, TIA/stroke  A/P: Will check cardiac workup with TSH, treat symptoms, reevaluate for further work up and disposition      Critical Care Time  CritCare Time    Procedures

## 2018-06-14 NOTE — PROGRESS NOTES
Assessment/Plan:  Assessment/Plan   Diagnoses and all orders for this visit:    Closed fracture of multiple pubic rami, right, with routine healing, subsequent encounter  -     XR hip/pelv 2-3 vws right if performed; Future  -     Ambulatory referral to Physical Therapy; Future    Pain in right hip  -     Ambulatory referral to Physical Therapy; Future    Primary osteoarthritis of one hip, right  -     Ambulatory referral to Physical Therapy; Future      Mr Brenda Alcantara right superior and inferior pubic rami fracture is healing quite well  Has been a good bone bridging callus formation  My clinical impression is that the right great pain is most likely from his mild osteoarthritis  I have referred him to physical therapy for rehabilitation due to deconditioning from his fracture as well as right groin pain  He will follow up for re-evaluation in 6 weeks  We will repeat his right hip x-ray during that encounter  Weight-bearing and ambulation as tolerated  He may transition out of the wheeled walker and into a straight cane      Subjective:   Patient ID: Sara Jones is a 80 y o  male  HPI    Mr Cathy Apley presents with his wife and son for follow-up re-evaluation of his right superior and inferior pubic rami fractures  He is currently ambulating with a wheeled walker  On today's presentation, he reports remarkable pain improvement  However, is now experiencing some mild mechanical right groin pain  Otherwise, no other complaints  Review of Systems  Review of Systems   Constitutional: Negative  HENT: Negative  Eyes: Negative  Respiratory: Negative  Cardiovascular: Negative  Musculoskeletal:        As per history of present illness   Skin: Negative  Neurological:  Negative   Psychiatric/Behavioral: Negative          Objective:  Vitals:    06/14/18 1009   BP: 156/86   Pulse: 69   Weight: 61 2 kg (135 lb)   Height: 5' 4" (1 626 m)       Right Hip Exam     Tests   EVERARDO: positive    Other   Erythema: absent  Sensation: normal  Pulse: present            Physical Exam  Constitutional: Oriented to person, place, and time  Well-developed and well-nourished  HENT:   Head: Normocephalic and atraumatic  Eyes: Conjunctivae are normal    Cardiovascular: Normal rate  Pulmonary/Chest: Effort normal    Neurological: Alert and oriented to person, place, and time  Skin: Skin is warm and dry  Psychiatric: Normal mood and affect  I have personally reviewed pertinent films in PACS and my interpretation is Right hip x-ray done today significant for interval healing of the superior and inferior right pubic ramus with good callus formation over the inferior pubic rami fracture site  There is also interval good bone bridging at the superior pubic rami fracture site  Julio Alcantara

## 2018-06-15 LAB
ATRIAL RATE: 67 BPM
P AXIS: 54 DEGREES
PR INTERVAL: 154 MS
QRS AXIS: 67 DEGREES
QRSD INTERVAL: 86 MS
QT INTERVAL: 386 MS
QTC INTERVAL: 407 MS
T WAVE AXIS: 65 DEGREES
VENTRICULAR RATE: 67 BPM

## 2018-06-15 PROCEDURE — 93010 ELECTROCARDIOGRAM REPORT: CPT | Performed by: INTERNAL MEDICINE

## 2018-06-15 NOTE — ED PROVIDER NOTES
History  Chief Complaint   Patient presents with    Weakness - Generalized     Pt reports generalized weakness  Pt reports feeling hot then cold  Pt has a hx of a TIA a few years ago  81 yo M presents to ED for episode of generalized weakness, lightheadedness, feeling hot and cold, and mild shortness of breath  Episode lasted about an hour and a half and are now completely resolved  Pt states that prior to symptom onset, he had gone outside to do yardwork, and started to feel fatigued, so he came inside to rest   Pt had pelvic fracture about 2 months ago and was wheelchair bound for a few weeks and then was gradually able to get around with walker  He has upcoming physical therapy  He denies any chest pain, fever/chills, URI symptoms, nausea/vomiting, abdominal pain            Prior to Admission Medications   Prescriptions Last Dose Informant Patient Reported? Taking?    Cholecalciferol (VITAMIN D3) 2000 units capsule   Yes Yes   Sig: Take by mouth daily   Inulin (METAMUCIL CLEAR & NATURAL PO)   Yes Yes   Sig: Take by mouth   Linaclotide (LINZESS) 145 MCG CAPS   Yes Yes   Sig: Take 1 tablet by mouth every other day   Multiple Vitamins-Minerals (ONE DAILY MENS 50+/LYCOPENE) TABS   Yes Yes   Sig: Take 1 tablet by mouth daily   Multiple Vitamins-Minerals (PRESERVISION AREDS) TABS   Yes Yes   Sig: Take 1 tablet by mouth every 12 (twelve) hours   amLODIPine (NORVASC) 5 mg tablet   Yes Yes   Sig: Take 7 5 mg by mouth daily     aspirin (ECOTRIN LOW STRENGTH) 81 mg EC tablet   Yes Yes   Sig: Take 81 mg by mouth 2 (two) times a week   atorvastatin (LIPITOR) 20 mg tablet   Yes Yes   Sig: Take 1 tablet by mouth daily   omeprazole (PriLOSEC) 40 MG capsule   Yes Yes   Sig: Take by mouth   tamsulosin (FLOMAX) 0 4 mg   Yes Yes   Sig: Take 1 capsule by mouth daily      Facility-Administered Medications: None       Past Medical History:   Diagnosis Date    Anemia     Aortic aneurysm (HCC)     Chronic kidney disease     Enlarged prostate     GERD (gastroesophageal reflux disease)     Hard of hearing     Hypercholesteremia     [facility]par[facility], [facility]     Hypertension     IBS (irritable bowel syndrome)     with constipation    Stroke ([facility] )     TIA       Past Surgical History:   Procedure Laterality Date    APPENDECTOMY      CATARACT EXTRACTION Bilateral     COLONOSCOPY      HERNIA REPAIR      x3    MA REPAIR RECURR INGUIN UNA,REDUCIBL Left 11/15/2017    Procedure: RECURRENT INGUNIAL HERNIA REPAIR;  Surgeon: Devan Domingo MD;  Location: [facility];  Service: General    TONSILLECTOMY         History reviewed  No pertinent family history  I have reviewed and agree with the history as documented  Social History   Substance Use Topics    Smoking status: Former Smoker    Smokeless tobacco: Never Used      Comment: quit 1950's    Alcohol use No        Review of Systems   Constitutional: Positive for activity change and fatigue  Negative for chills and fever  HENT: Negative for congestion, rhinorrhea, sore throat and trouble swallowing  Eyes: Negative for pain, discharge and visual disturbance  Respiratory: Positive for shortness of breath  Negative for chest tightness and wheezing  Cardiovascular: Negative for chest pain, palpitations and leg swelling  Gastrointestinal: Negative for abdominal pain, nausea and vomiting  Genitourinary: Negative for dysuria, frequency and urgency  Musculoskeletal: Negative for gait problem, neck pain and neck stiffness  Skin: Negative for color change, rash and wound  Neurological: Positive for light-headedness  Negative for dizziness and syncope         Physical Exam  ED Triage Vitals [06/14/18 1753]   Temperature Pulse Respirations Blood Pressure SpO2   98 4 °F (36 9 °C) 73 18 156/79 97 %      Temp src Heart Rate Source Patient Position - Orthostatic VS BP Location FiO2 (%)   -- -- -- -- --      Pain Score       No Pain           Orthostatic Vital Signs  Vitals:    06/14/18 1753 06/14/18 1830 06/14/18 2015   BP: 156/79 162/93 154/93   Pulse: 73 78 84       Physical Exam   Constitutional: He is oriented to person, place, and time  He appears well-developed and well-nourished  No distress  HENT:   Head: Normocephalic and atraumatic  Mouth/Throat: Oropharynx is clear and moist    Eyes: Conjunctivae and EOM are normal  Right eye exhibits no discharge  Left eye exhibits no discharge  Neck: Normal range of motion  Neck supple  Cardiovascular: Normal rate, regular rhythm and intact distal pulses  Pulmonary/Chest: Effort normal and breath sounds normal  No stridor  No respiratory distress  He exhibits no tenderness  Abdominal: Soft  There is no tenderness  There is no rebound and no guarding  Musculoskeletal: He exhibits edema (mild bilateral 1+ pitting edema lower extremities)  He exhibits no tenderness or deformity  Neurological: He is alert and oriented to person, place, and time  No sensory deficit  He exhibits normal muscle tone  Skin: Skin is warm and dry  Capillary refill takes less than 2 seconds  Nursing note and vitals reviewed  ED Medications  Medications - No data to display    Diagnostic Studies  Results Reviewed     Procedure Component Value Units Date/Time    Basic metabolic panel [[id number]]  (Abnormal) Collected:  06/14/18 1906    Lab Status:  Final result Specimen:  Blood from Arm, Left Updated:  06/14/18 1936     Sodium 138 mmol/L      Potassium 4 2 mmol/L      Chloride 102 mmol/L      CO2 28 mmol/L      Anion Gap 8 mmol/L      BUN 22 mg/dL      Creatinine 1 40 (H) mg/dL      Glucose 84 mg/dL      Calcium 9 2 mg/dL      eGFR 45 ml/min/1 73sq m     Narrative:         National Kidney Disease Education Program recommendations are as follows:  GFR calculation is accurate only with a steady state creatinine  Chronic Kidney disease less than 60 ml/min/1 73 sq  meters  Kidney failure less than 15 ml/min/1 73 sq  meters      TSH, 3rd generation with Free T4 reflex [[id number]]  (Normal) Collected:  06/14/18 1906    Lab Status:  Final result Specimen:  Blood from Arm, Left Updated:  06/14/18 1936     TSH 3RD GENERATON 3 190 uIU/mL     Narrative:         Patients undergoing fluorescein dye angiography may retain small amounts of fluorescein in the body for 48-72 hours post procedure  Samples containing fluorescein can produce falsely depressed TSH values  If the patient had this procedure,a specimen should be resubmitted post fluorescein clearance      Troponin I [[id number]]  (Normal) Collected:  06/14/18 1906    Lab Status:  Final result Specimen:  Blood from Arm, Left Updated:  06/14/18 1930     Troponin I <0 02 ng/mL     CBC and differential [[id number]] Collected:  06/14/18 1906    Lab Status:  Final result Specimen:  Blood from Arm, Left Updated:  06/14/18 1914     WBC 6 61 Thousand/uL      RBC 4 42 Million/uL      Hemoglobin 13 1 g/dL      Hematocrit 40 8 %      MCV 92 fL      MCH 29 6 pg      MCHC 32 1 g/dL      RDW 14 4 %      MPV 9 3 fL      Platelets 901 Thousands/uL      nRBC 0 /100 WBCs      Neutrophils Relative 58 %      Immat GRANS % 1 %      Lymphocytes Relative 27 %      Monocytes Relative 9 %      Eosinophils Relative 4 %      Basophils Relative 1 %      Neutrophils Absolute 3 87 Thousands/µL      Immature Grans Absolute 0 03 Thousand/uL      Lymphocytes Absolute 1 75 Thousands/µL      Monocytes Absolute 0 59 Thousand/µL      Eosinophils Absolute 0 29 Thousand/µL      Basophils Absolute 0 08 Thousands/µL                  XR chest 2 views    (Results Pending)         Procedures  ECG 12 Lead Documentation  Date/Time: 6/14/2018 7:00 PM  Performed by: Ruthie Hicks  Authorized by: Ruthie Hicks     Indications / Diagnosis:  Generalized weakness  ECG reviewed by me, the ED Provider: yes    Patient location:  ED  Previous ECG:     Previous ECG:  Compared to current    Similarity:  No change  Interpretation:     Interpretation: normal Rate:     ECG rate:  67    ECG rate assessment: normal    Rhythm:     Rhythm: sinus rhythm    ST segments:     ST segments:  Normal  T waves:     T waves: normal            Phone Consults  ED Phone Contact    ED Course           Identification of Seniors at Risk      Most Recent Value   (ISAR) Identification of Seniors at Risk   Before the illness or injury that brought you to the Emergency, did you need someone to help you on a regular basis? 0 Filed at: 06/14/2018 1755   In the last 24 hours, have you needed more help than usual?  0 Filed at: 06/14/2018 1755   Have you been hospitalized for one or more nights during the past 6 months? 0 Filed at: 06/14/2018 1755   In general, do you see well?  0 Filed at: 06/14/2018 1755   In general, do you have serious problems with your memory? 0 Filed at: 06/14/2018 1755   Do you take more than three different medications every day? 1 Filed at: 06/14/2018 1755   ISAR Score  1 Filed at: 06/14/2018 1755                          MDM  Number of Diagnoses or Management Options  Generalized weakness:   Diagnosis management comments: CBC, BMP, troponin, TSH, EKG, CXR unremarkable  Pt well appearing, benign physical exam    Symptoms likely due to overexertion as pt has deconditioning with recent pelvic fracture  Will discharge to home  Return precautions discussed  Follow up with PCP         Amount and/or Complexity of Data Reviewed  Clinical lab tests: reviewed  Decide to obtain previous medical records or to obtain history from someone other than the patient: yes      CritCare Time    Disposition  Final diagnoses:   Generalized weakness     Time reflects when diagnosis was documented in both MDM as applicable and the Disposition within this note     Time User Action Codes Description Comment    6/14/2018  8:35 PM Shana Armstrong Add [R53 1] Generalized weakness       ED Disposition     ED Disposition Condition Comment    Discharge  Ana Quach discharge to home/self care     Condition at discharge: Stable        Follow-up Information     Follow up With Specialties Details Why Contact Info Additional [address] Emergency Department Emergency Medicine  If symptoms worsen, As needed [address]  [facility] ED, [address] [place], [place], [address] [place] [address], MD Internal Medicine In 3 days As needed [address] [phone]             Patient's Medications   Discharge Prescriptions    No medications on file     No discharge procedures on file  ED Provider  Attending physically available and evaluated [employer]  I managed the patient along with the ED Attending      Electronically Signed by         Yolanda Mcguire MD  06/15/18 9859

## 2018-06-15 NOTE — DISCHARGE INSTRUCTIONS
Weakness   WHAT YOU NEED TO KNOW:   Weakness is a loss of muscle strength  It may be caused by brain, nerve, or muscle problems  Physical and mental conditions such as heart problems, pregnancy, dehydration, or depression may also cause weakness  Reactions to certain drugs can cause weakness  Parts of your body may become weak if you need to wear a cast or splint or have been on bed rest for a long time  DISCHARGE INSTRUCTIONS:   Call 911 for any of the following:   · You have any of the following signs of a stroke:      ¨ Numbness or drooping on one side of your face     ¨ Weakness in an arm or leg    ¨ Confusion or difficulty speaking    ¨ Dizziness, a severe headache, or vision loss    · You lose feeling in your weakened body area  · You have electric shock-like feelings down your arms and legs when you flex or move your neck  · You have sudden or increased trouble speaking, swallowing, or breathing  Return to the emergency department if:   · You have severe pain in your back, arms, or legs that worsens  · You have sudden or worsened muscle weakness or loss of movement  · You are not able to control when you urinate or have a bowel movement  Contact your healthcare provider if:   · You feel depressed or anxious  · You have questions or concerns about your condition or care  Manage weakness:   · Use assistive devices as directed  These help protect you from injury  Examples include a walker or cane  Have someone install handrails in your home  These will help you get out of a bathtub or stand up from a toilet  Use a shower chair so you can sit while you shower  Sit down on the toilet or another chair to dry off and put on your clothes  Get help going up and down stairs if your legs are weak  · Go to physical or occupational therapy if directed  A physical therapist can teach you exercises to help strengthen weak muscles   An occupational therapist can show you ways to do your daily activities more easily  For example, light forks and spoons can be easier to use if you have hand weakness  You may also learn ways to organize your household items so you are not moving heavy items  · Balance rest with exercise  Exercise can help increase your muscle strength and energy  Do not exercise for long periods at a time  Take breaks often to rest  Too much exercise can cause muscle strain or make you more tired  Ask your healthcare provider how much exercise is right for you  · Eat a variety of healthy foods  Too much or too little food may cause weakness or tiredness  Ask your healthcare provider what a healthy amount of food is for you  Healthy foods include fruits, vegetables, whole-grain breads, low-fat dairy products, lean meats and fish, nuts, and cooked beans  · Do not smoke  Nicotine and other chemicals in cigarettes and cigars can make your symptoms worse, and can cause lung damage  Ask your healthcare provider for information if you currently smoke and need help to quit  E-cigarettes or smokeless tobacco still contain nicotine  Talk to your healthcare provider before you use these products  · Do not use caffeine, alcohol, or illegal drugs  These may cause muscle twitching, which could lead to worsened weakness  Follow up with your healthcare provider as directed:  Write down your questions so you remember to ask them during your visits  © 2017 [address] Information is for End User's use only and may not be sold, redistributed or otherwise used for commercial purposes  All illustrations and images included in CareNotes® are the copyrighted property of [employer]  or [employer]  The above information is an  only  It is not intended as medical advice for individual conditions or treatments  Talk to your doctor, nurse or pharmacist before following any medical regimen to see if it is safe and effective for you

## 2018-06-22 ENCOUNTER — EVALUATION (OUTPATIENT)
Dept: PHYSICAL THERAPY | Facility: CLINIC | Age: 83
End: 2018-06-22
Payer: COMMERCIAL

## 2018-06-22 DIAGNOSIS — M25.551 PAIN IN RIGHT HIP: ICD-10-CM

## 2018-06-22 DIAGNOSIS — M16.11 PRIMARY OSTEOARTHRITIS OF ONE HIP, RIGHT: ICD-10-CM

## 2018-06-22 DIAGNOSIS — S32.591D CLOSED FRACTURE OF MULTIPLE PUBIC RAMI, RIGHT, WITH ROUTINE HEALING, SUBSEQUENT ENCOUNTER: ICD-10-CM

## 2018-06-22 PROCEDURE — G8979 MOBILITY GOAL STATUS: HCPCS | Performed by: PHYSICAL THERAPIST

## 2018-06-22 PROCEDURE — 97110 THERAPEUTIC EXERCISES: CPT | Performed by: PHYSICAL THERAPIST

## 2018-06-22 PROCEDURE — 97162 PT EVAL MOD COMPLEX 30 MIN: CPT | Performed by: PHYSICAL THERAPIST

## 2018-06-22 PROCEDURE — G8978 MOBILITY CURRENT STATUS: HCPCS | Performed by: PHYSICAL THERAPIST

## 2018-06-22 NOTE — PROGRESS NOTES
PT Evaluation     Today's date: 2018  Patient name: Ana Quach  : 1933  MRN: [id number]  Referring provider: Nikki Miller MD  Dx:   Encounter Diagnosis     ICD-10-CM    1  Closed fracture of multiple pubic rami, right, with routine healing, subsequent encounter S32 591D Ambulatory referral to Physical Therapy   2  Pain in right hip M25 551 Ambulatory referral to Physical Therapy   3  Primary osteoarthritis of one hip, right M16 11 Ambulatory referral to Physical Therapy                  Assessment    Assessment details: Pt presents with s/s consistent with R hip OA and demonstrates post-fx and immobilization impairments of R hip ROM, strength, core stability, gait, balance, and ambulation tolerance  Pt will benefit from skilled PT services to address his impairments in order to decrease pain, improve functional mobility, and improve safety  Pt requests 1 visit/week secondary to $40 co-pay  Understanding of Dx/Px/POC: good   Prognosis: fair    Goals  STG - 4 wks  1) pt will be I with HEP  2) pt will demonstrate > 30 deg ABD and > 125 deg FLEX PROM  3) pt will d/c SPC  4) pt will demonstrate > 600' ambulation tolerance  5) pt will improve pain levels > 50%    LTG - 8-12 wks  1) pt will demonstrate > 1/4 mile ambulation tolerance  2) pt will demonstrate > 3+/5 R hip strength  3) pt will demonstrate reciprocal gait pattern on 8" stairs  4) pt will improve pain levels > 75%    Plan  Planned therapy interventions: abdominal trunk stabilization, joint mobilization, manual therapy, therapeutic activities, therapeutic exercise, stretching, strengthening, balance, home exercise program, gait training and neuromuscular re-education  Frequency: 1x week  Duration in weeks: 8  Treatment plan discussed with: patient and family        Subjective Evaluation    History of Present Illness  Mechanism of injury: Pt is a 80 y o  male with PMHx significant for CAD, s/p TIA, aortic aneurysm, HTN, IBS    He reports gradual onset of R groin, hip, and LBP 2 days s/p fall 2018  He is currently s/p R superior and inferior pubic rami fx following WC x 2 wks, FWW x 4-6 wks  He reports intermittent R groin pain with sleeping > sitting > WB activities, a 100' ambulation tolerance with SPC limited secondary to fatigue, and L step-to pattern on stairs with moderate railing use secondary to R LE weakness  He reports pre-morbid daily TM walking x 15 minutes  Pain  Current pain ratin  At best pain ratin  At worst pain ratin    Social Support  Stairs in house: yes   Lives with: spouse      Diagnostic Tests  X-ray: abnormal (well-helaing fx per last x-rays)  Treatments  No previous or current treatments  Patient Goals  Patient goals for therapy: decreased pain, independence with ADLs/IADLs and return to sport/leisure activities          Objective     Active Range of Motion     Lumbar   Flexion: 70 degrees with pain  Extension: 10 degrees with pain  Left lateral flexion: 20 degrees   Right lateral flexion: 20 degrees   Left rotation: 60 degrees   Right rotation: 45 degrees with pain    Passive Range of Motion     Right Hip   Flexion: 118 degrees with pain  Extension: 0 degrees with pain  Abduction: 20 degrees with pain  Adduction: 30 degrees   External rotation (90/90): 30 degrees   Internal rotation (90/90): 15 degrees with pain    Strength/Myotome Testing     Right Hip   Planes of Motion   Flexion: 3-  Extension: 3-  Abduction: 3-  Adduction: 3+  External rotation: 3-  Internal rotation: 3+    Ambulation   Weight-Bearing Status   Weight-Bearing Status (Right): weight-bearing as tolerated      Ambulation: Level Surfaces   Ambulation with assistive device: independent  Ambulation without assistive device: contact guard assist    Observational Gait   Left stance time, right stance time, left swing time, right swing time, left step length and right step length within functional limits   Decreased walking speed and stride length  Left foot contact pattern: heel to toe  Right foot contact pattern: heel to toe    Additional Observational Gait Details  O: pt demonstrate mild L > R gait path deviation    Quality of Movement During Gait   Trunk    Trunk (Left): Positive left lateral lean over stance limb  Trunk (Right): Positive lateral lean over stance limb  Pelvis    Pelvis (Right): Positive Trendelenburg  Functional Assessment     Single Leg Stance   Left: 17 seconds  Right: 3 seconds    Comments  STS: unweights R LE, mod L UE assist          Precautions:  PMHx: HTN, IBS, CAD, aortic aneurysm, s/p TIA  USE GAIT BELT    Daily Treatment Diary     Manual  6/22            R hip PROM                                                                     Exercise Diary  6/22            SKC 5"x5            Hip ABD/ADD AROM in supine 1x10            Supine R hip ER drop-outs             Abdominal bracing 5"x10            bridges 1x10            Supine clamshells Y/  1x15            BioDex: SLS             STS: adjustable table             Gait training: no AD on floor                                                                                                                                                                Modalities

## 2018-06-28 ENCOUNTER — OFFICE VISIT (OUTPATIENT)
Dept: PHYSICAL THERAPY | Facility: CLINIC | Age: 83
End: 2018-06-28
Payer: COMMERCIAL

## 2018-06-28 DIAGNOSIS — M16.11 PRIMARY OSTEOARTHRITIS OF ONE HIP, RIGHT: ICD-10-CM

## 2018-06-28 DIAGNOSIS — S32.591D CLOSED FRACTURE OF MULTIPLE PUBIC RAMI, RIGHT, WITH ROUTINE HEALING, SUBSEQUENT ENCOUNTER: Primary | ICD-10-CM

## 2018-06-28 DIAGNOSIS — M25.551 PAIN IN RIGHT HIP: ICD-10-CM

## 2018-06-28 PROCEDURE — 97140 MANUAL THERAPY 1/> REGIONS: CPT | Performed by: PHYSICAL THERAPIST

## 2018-06-28 PROCEDURE — 97110 THERAPEUTIC EXERCISES: CPT | Performed by: PHYSICAL THERAPIST

## 2018-06-28 PROCEDURE — 97112 NEUROMUSCULAR REEDUCATION: CPT | Performed by: PHYSICAL THERAPIST

## 2018-06-28 NOTE — PROGRESS NOTES
Daily Note     Today's date: 2018  Patient name: Rashad Beltran  : 1933  MRN: [id number]  Referring provider: Danette Archuleta MD  Dx:   Encounter Diagnosis     ICD-10-CM    1  Closed fracture of multiple pubic rami, right, with routine healing, subsequent encounter S32 591D    2  Pain in right hip M25 551    3  Primary osteoarthritis of one hip, right M16 11                   Subjective: Pt reports high levels of R LBP with HEP, particularly abdominal bracing  He reports compliance 2 times per day  Objective: See treatment diary below  Ttp: R PSIS, SI jnt line  R iliac crest appears higher in standing, R LE longer in supine  Added L 3/8" heel lift  Instructed pt to perform updated HEP 1 time per day    Assessment: Pt demonstrated improved pain following MT and L heel lift  Pt's pain is likely secondary to R SIJ instability, especially with WB  Plan: Assess response NV          Precautions:  PMHx: HTN, IBS, CAD, aortic aneurysm, s/p TIA  USE GAIT BELT     Daily Treatment Diary      Manual                     R hip PROM    5 min                   R innominate post MET    5"x5                   Gr III R innominate AP mobs in SL    1x50                                                                         Exercise Diary                     SKC 5"x5  np                   Hip ABD/ADD AROM in supine 1x10  1x10                   Supine R hip ER drop-outs    np                   Abdominal bracing 5"x10  5"x10                   bridges 1x10  1x10                   Supine clamshells Y/  1x15  Y/   1x15                   BioDex: SLS                       STS: adjustable table                       Gait trainin [address] on floor    1x200'   1x100'                   Abdominal bracing w/ supine     1x15   ea

## 2018-07-05 ENCOUNTER — OFFICE VISIT (OUTPATIENT)
Dept: PHYSICAL THERAPY | Facility: CLINIC | Age: 83
End: 2018-07-05
Payer: COMMERCIAL

## 2018-07-05 DIAGNOSIS — M16.11 PRIMARY OSTEOARTHRITIS OF ONE HIP, RIGHT: ICD-10-CM

## 2018-07-05 DIAGNOSIS — M25.551 PAIN IN RIGHT HIP: ICD-10-CM

## 2018-07-05 DIAGNOSIS — S32.591D CLOSED FRACTURE OF MULTIPLE PUBIC RAMI, RIGHT, WITH ROUTINE HEALING, SUBSEQUENT ENCOUNTER: Primary | ICD-10-CM

## 2018-07-05 PROCEDURE — G8981 BODY POS CURRENT STATUS: HCPCS | Performed by: PHYSICAL THERAPIST

## 2018-07-05 PROCEDURE — 97110 THERAPEUTIC EXERCISES: CPT | Performed by: PHYSICAL THERAPIST

## 2018-07-05 PROCEDURE — 97112 NEUROMUSCULAR REEDUCATION: CPT | Performed by: PHYSICAL THERAPIST

## 2018-07-05 PROCEDURE — G8982 BODY POS GOAL STATUS: HCPCS | Performed by: PHYSICAL THERAPIST

## 2018-07-05 NOTE — PROGRESS NOTES
Daily Note     Today's date: 2018  Patient name: Nicole Childers  : 1933  MRN: [id number]  Referring provider: Joshua Carbajal MD  Dx:   Encounter Diagnosis     ICD-10-CM    1  Closed fracture of multiple pubic rami, right, with routine healing, subsequent encounter S32 080R    2  Primary osteoarthritis of one hip, right M16 11    3  Pain in right hip M25 551                   Subjective: Pt reports no pain with HEP performance and mildly improved ambulation tolerance with use of L heel lift but stiff with high levels of R SIJ pain with WB activities  Objective: See treatment diary below  Ttp: R PSIS, SI jnt line  LL: supine: equal, long-sit: R appears longer  Increased HEP to 2 times/day    Assessment: Pt demonstrated functional R > L LL secondary to R SIJ instability  Pt demonstrated improved tolerance to core stability, limited tolerance to WB and R hip PROM  Plan: Assess response NV          Precautions:  PMHx: HTN, IBS, CAD, aortic aneurysm, s/p TIA  USE GAIT BELT     Daily Treatment Diary      Manual    75                 R hip PROM    5 min  3 min                 R innominate post MET    5"x5  5"x10                 Gr III R innominate AP mobs in SL    1x50  A-P   1x50                                                                        Exercise Diary    7/5                 SKC 5"x5  np  np                 Hip ABD/ADD AROM in supine 1x10  1x10  np                 Supine R hip ER drop-outs    np  np                 Abdominal bracing 5"x10  5"x10  5"x20                 bridges 1x10  1x10  1x15                 Supine clamshells Y/  1x15  Y/   1x15  Y/   1x20                 STS: adjustable table                       Gait training: SPC on floor    1x200'   1x100'  FWW   2x100'                 Abdominal bracing w/ supine marches    1x15   ea  1x20   ea                 Abdominal bracing w/ iso hip ADD in hooklying      5"x10

## 2018-07-12 ENCOUNTER — OFFICE VISIT (OUTPATIENT)
Dept: PHYSICAL THERAPY | Facility: CLINIC | Age: 83
End: 2018-07-12
Payer: COMMERCIAL

## 2018-07-12 DIAGNOSIS — S32.591D CLOSED FRACTURE OF MULTIPLE PUBIC RAMI, RIGHT, WITH ROUTINE HEALING, SUBSEQUENT ENCOUNTER: Primary | ICD-10-CM

## 2018-07-12 DIAGNOSIS — M16.11 PRIMARY OSTEOARTHRITIS OF ONE HIP, RIGHT: ICD-10-CM

## 2018-07-12 PROCEDURE — 97112 NEUROMUSCULAR REEDUCATION: CPT | Performed by: PHYSICAL THERAPIST

## 2018-07-12 PROCEDURE — 97110 THERAPEUTIC EXERCISES: CPT | Performed by: PHYSICAL THERAPIST

## 2018-07-12 NOTE — PROGRESS NOTES
Daily Note     Today's date: 2018  Patient name: Wenceslao Aviles  : 1933  MRN: [id number]  Referring provider: Eva Moise MD  Dx:   No diagnosis found  Subjective: Pt reports improved pain levels for 2-3 days following last treatment that were re-aggravated Tuesday when he was taking out the trash  He currently reports 5-6/10 R PSIS pain at worst with ambulation  Objective: See treatment diary below  Ttp: R PSIS, SI jnt line  LL: supine: equal, long-sit: R appears longer    Assessment: Pt demonstrated improved ambulation tolerance and tolerance to core stabilization however no short-term response to MT  Plan: Assess response NV  Consider trial of R SL lumbar closing mobs and SIJ belt NV          Precautions:  PMHx: HTN, IBS, CAD, aortic aneurysm, s/p TIA  USE GAIT BELT     Daily Treatment Diary      Manual                 R hip PROM    5 min  3 min  np               R innominate post MET    5"x5  5"x10  5"x10               Gr III R innominate AP mobs in SL    1x50  A-P   1x50    A-P,   P-A   inf,sup   1x30    ea               R ant/L post innominate rot MET        5"x10                                             Exercise Diary                 SKC 5"x5  np  np  np               Hip ABD/ADD AROM in supine 1x10  1x10  np  np               Supine R hip ER drop-outs    np  np  np               Abdominal bracing 5"x10  5"x10  5"x20  5"x20               bridges 1x10  1x10  1x15  1x10   1x5               Supine clamshells Y/  1x15  Y/   1x15  Y/   1x20  Y/   2x15               STS: adjustable table                       Gait training: SPC on floor    1x200'   1x100'  FWW   2x100'  SPC   1x200'   1x100'               Abdominal bracing w/ supine marches    1x15   ea  1x20   ea  2x15  ea               Abdominal bracing w/ iso hip ADD in hooklying      5"x10  2 2#/   5"x20

## 2018-07-26 ENCOUNTER — OFFICE VISIT (OUTPATIENT)
Dept: OBGYN CLINIC | Facility: OTHER | Age: 83
End: 2018-07-26
Payer: COMMERCIAL

## 2018-07-26 ENCOUNTER — APPOINTMENT (OUTPATIENT)
Dept: RADIOLOGY | Facility: OTHER | Age: 83
End: 2018-07-26
Payer: COMMERCIAL

## 2018-07-26 VITALS
BODY MASS INDEX: 21.96 KG/M2 | SYSTOLIC BLOOD PRESSURE: 166 MMHG | WEIGHT: 131.8 LBS | HEART RATE: 68 BPM | HEIGHT: 65 IN | DIASTOLIC BLOOD PRESSURE: 90 MMHG

## 2018-07-26 DIAGNOSIS — S32.591D CLOSED FRACTURE OF MULTIPLE PUBIC RAMI, RIGHT, WITH ROUTINE HEALING, SUBSEQUENT ENCOUNTER: Primary | ICD-10-CM

## 2018-07-26 DIAGNOSIS — M53.3 PAIN OF RIGHT SACROILIAC JOINT: ICD-10-CM

## 2018-07-26 DIAGNOSIS — S32.591D CLOSED FRACTURE OF MULTIPLE PUBIC RAMI, RIGHT, WITH ROUTINE HEALING, SUBSEQUENT ENCOUNTER: ICD-10-CM

## 2018-07-26 DIAGNOSIS — I10 HYPERTENSION, UNSPECIFIED TYPE: Primary | ICD-10-CM

## 2018-07-26 PROCEDURE — 73502 X-RAY EXAM HIP UNI 2-3 VIEWS: CPT

## 2018-07-26 PROCEDURE — 99213 OFFICE O/P EST LOW 20 MIN: CPT | Performed by: INTERNAL MEDICINE

## 2018-07-26 RX ORDER — PREDNISONE 10 MG/1
10 TABLET ORAL DAILY
Qty: 7 TABLET | Refills: 0 | Status: SHIPPED | OUTPATIENT
Start: 2018-07-26 | End: 2018-10-23

## 2018-07-26 RX ORDER — AMLODIPINE BESYLATE 5 MG/1
5 TABLET ORAL DAILY
Qty: 90 TABLET | Refills: 3 | Status: SHIPPED | OUTPATIENT
Start: 2018-07-26 | End: 2019-10-22

## 2018-07-26 NOTE — PROGRESS NOTES
Assessment/Plan:  Assessment/Plan   Diagnoses and all orders for this visit:    Closed fracture of multiple pubic rami, right, with routine healing, subsequent encounter  -     Cancel: XR hip/pelvis 4+ vw right if performed; Future    Pain of right sacroiliac joint  -     predniSONE 10 mg tablet; Take 1 tablet (10 mg total) by mouth daily      Mr Johnson's   Physical examination is significant for palpable right SI joint tenderness  My clinical impression is that he has sacroiliitis  This may be an incidental  Onset in etiology or he may be related  To an activity that he did which may have caused aggravation of the SI joint hands resulting to these pain  With regards to his fracture, there is an exuberant callus formation  My clinical impression is that the fracture has healed  Therefore, I have discharged him from my service from the fracture standpoint  I have start him on prednisone 10 mg p  O  Daily x1 week for the Sacroiliitis  He should also refrain from any activity that causes SI joint pain exacerbation  Subjective:   Patient ID: Khurram Monsivais is a 80 y o  male  HPI    Mr Jaime Shabazz presents for follow-up re-evaluation of his right superior and inferior pubic rami fracture  On today's presentation, he reports that he is now having some  Pain in his posterior upper buttocks area  Otherwise, no other complaints  Review of Systems  Review of Systems   Constitutional: Negative  HENT: Negative  Eyes: Negative  Respiratory: Negative  Cardiovascular: Negative  Musculoskeletal:        As per history of present illness   Skin: Negative  Neurological:        As per history of present illness   Psychiatric/Behavioral: Negative          Objective:  Vitals:    07/26/18 1011   BP: 166/90   Pulse: 68   Weight: 59 8 kg (131 lb 12 8 oz)   Height: 5' 5" (1 651 m)       Right Hip Exam     Tenderness   Right hip tenderness location: Right SI joint tenderness noted             Physical Exam  Constitutional: Oriented to person, place, and time  Well-developed and well-nourished  HENT:   Head: Normocephalic and atraumatic  Eyes: Conjunctivae are normal    Cardiovascular: Normal rate  Pulmonary/Chest: Effort normal    Neurological: Alert and oriented to person, place, and time  Skin: Skin is warm and dry  Psychiatric: Normal mood and affect  I have personally reviewed pertinent films in PACS and my interpretation is Repeat right hip x-ray done today shows very good callus formation over the fracture site  Consistent with healed superior and inferior pubic rami fracture

## 2018-07-30 ENCOUNTER — TELEPHONE (OUTPATIENT)
Dept: OBGYN CLINIC | Facility: HOSPITAL | Age: 83
End: 2018-07-30

## 2018-07-30 NOTE — TELEPHONE ENCOUNTER
Patients wife called explaining that her  fell it sounded like twice out of bed onto his knee and his knee is now black and blue and is having the same intensity pain in it as he had with his pelvic fracture  Patients wife was informed that she should take him to an er to be evaluated for the knee but insisted on just bringing him in with the doctor  Patient was scheduled for Thursday for the knee as that was first available

## 2018-08-02 ENCOUNTER — APPOINTMENT (OUTPATIENT)
Dept: RADIOLOGY | Facility: OTHER | Age: 83
End: 2018-08-02
Payer: COMMERCIAL

## 2018-08-02 ENCOUNTER — OFFICE VISIT (OUTPATIENT)
Dept: OBGYN CLINIC | Facility: OTHER | Age: 83
End: 2018-08-02
Payer: COMMERCIAL

## 2018-08-02 VITALS
HEART RATE: 89 BPM | SYSTOLIC BLOOD PRESSURE: 157 MMHG | DIASTOLIC BLOOD PRESSURE: 88 MMHG | BODY MASS INDEX: 22.71 KG/M2 | WEIGHT: 133 LBS | HEIGHT: 64 IN

## 2018-08-02 DIAGNOSIS — M25.551 PAIN IN RIGHT HIP: Primary | ICD-10-CM

## 2018-08-02 DIAGNOSIS — S89.92XA INJURY OF LEFT KNEE, INITIAL ENCOUNTER: ICD-10-CM

## 2018-08-02 DIAGNOSIS — M53.3 PAIN OF RIGHT SACROILIAC JOINT: ICD-10-CM
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
## 2018-08-02 DIAGNOSIS — M25.562 LEFT KNEE PAIN, UNSPECIFIED CHRONICITY: ICD-10-CM

## 2018-08-02 DIAGNOSIS — M25.551 PAIN IN RIGHT HIP: ICD-10-CM

## 2018-08-02 PROCEDURE — 99214 OFFICE O/P EST MOD 30 MIN: CPT | Performed by: INTERNAL MEDICINE

## 2018-08-02 PROCEDURE — 73562 X-RAY EXAM OF KNEE 3: CPT

## 2018-08-02 PROCEDURE — 73502 X-RAY EXAM HIP UNI 2-3 VIEWS: CPT

## 2018-08-02 NOTE — PROGRESS NOTES
Assessment/Plan:    Diagnoses and all orders for this visit:    Pain in right hip  -     XR hip/pelv 2-3 vws right if performed; Future    Injury of left knee, initial encounter  -     XR knee 3 vw left non injury; Future    Fall, initial encounter    Pain of right sacroiliac joint      Cassandra Sheffield appears to have experienced a soft tissue injury to his right hip s/p mechanical fall  X-rays of left knee and right hip were reviewed and did not show any evidence of acute fracture  At this time, we recommended continued conservative management with Tylenol PRN  If no improvement in two weeks, we will consider referral to pain management for evaluation for possible steroid injection  Cassandra Sheffield acknowledged understanding and agreement with the plan  Subjective:     PEE Sheffield is an 80year old male that comes to the office due to concerns of right hip and left knee pain  He states that he experienced a fall getting out of bed five days ago which was mechanical in nature, landing on his left knee and then subsequently on his right hip  He was recently treated for sacroiliitis and completed a 7 day course of prednisone  He also has history of closed fracture of multiple pubic rami  Today, he reports he is experiencing persistent right hip pain and has a bruise on his left knee  The hip pain is intermittent, sharp in quality, 7/10 intensity, non-radiating  He is only taking a baby Aspirin for his pain  He has finished his course of prednisone  He states that that his hip was getting better with the prednisone until he experienced the fall  He has no major pain concerns about the left knee  He denies numbness, tingling, weakness of lower extremities  Review of Systems   Constitutional: Negative for chills and fever  HENT: Negative for congestion, rhinorrhea and sore throat  Eyes: Negative for visual disturbance  Respiratory: Negative for shortness of breath  Cardiovascular: Negative for chest pain  Gastrointestinal: Negative for abdominal pain  Musculoskeletal: Positive for arthralgias (right hip pain)  Skin: Negative for rash  Objective:    Vitals:    08/02/18 1317   BP: 157/88   Pulse: 89       Physical Exam   Constitutional: He is oriented to person, place, and time  He appears well-developed and well-nourished  No distress  HENT:   Head: Normocephalic and atraumatic  Right Ear: External ear normal    Left Ear: External ear normal    Nose: Nose normal    Eyes: EOM are normal  No scleral icterus  Neck: Neck supple  Pulmonary/Chest: Effort normal  No respiratory distress  Abdominal: Soft  Neurological: He is alert and oriented to person, place, and time  Skin: Skin is warm  He is not diaphoretic  Psychiatric: He has a normal mood and affect  Left Knee Exam     Tenderness   None    Range of Motion   Normal left knee ROM    Muscle Strength   Normal left knee strength    Comments:  Ecchymosis over medial femoral condyle    Right Hip Exam     Tenderness   The patient is experiencing tenderness in the right superior, mid-gluteal tenderness   Range of Motion   Extension:         20  Flexion:                 Normal  Internal Rotation:  10  External Rotation:  Abduction:             Adduction:               Muscle Strength   Abduction:  4/5  Adduction:  4/5  Flexion:      4/5    Tests   Inderjit: Negative        I have personally reviewed pertinent films in PACS  Three view x-ray of the right hip shows: No acute fracture  There is degenerative changes and femoro-acetabular arthritis  Three view x-ray of the left knee shows: Degenerative changes without evidence of acute fracture

## 2018-08-03 ENCOUNTER — TELEPHONE (OUTPATIENT)
Dept: OBGYN CLINIC | Facility: HOSPITAL | Age: 83
End: 2018-08-03

## 2018-08-03 NOTE — TELEPHONE ENCOUNTER
Patient was seen in the office yesterday  His wife is calling to see if he is able to return to PT yet? She is asking if it would be a good idea

## 2018-08-23 ENCOUNTER — OFFICE VISIT (OUTPATIENT)
Dept: PHYSICAL THERAPY | Facility: CLINIC | Age: 83
End: 2018-08-23
Payer: COMMERCIAL

## 2018-08-23 DIAGNOSIS — S32.591D CLOSED FRACTURE OF MULTIPLE PUBIC RAMI, RIGHT, WITH ROUTINE HEALING, SUBSEQUENT ENCOUNTER: Primary | ICD-10-CM

## 2018-08-23 PROCEDURE — 97112 NEUROMUSCULAR REEDUCATION: CPT | Performed by: PHYSICAL THERAPIST

## 2018-08-23 PROCEDURE — G8991 OTHER PT/OT GOAL STATUS: HCPCS | Performed by: PHYSICAL THERAPIST

## 2018-08-23 PROCEDURE — G8990 OTHER PT/OT CURRENT STATUS: HCPCS | Performed by: PHYSICAL THERAPIST

## 2018-08-23 PROCEDURE — 97140 MANUAL THERAPY 1/> REGIONS: CPT | Performed by: PHYSICAL THERAPIST

## 2018-08-23 NOTE — PROGRESS NOTES
PT Re-Evaluation     Today's date: 2018  Patient name: Staci Guardado  : 1933  MRN: [id number]  Referring provider: Christel Bowers MD  Dx:   Encounter Diagnosis     ICD-10-CM    1  Closed fracture of multiple pubic rami, right, with routine healing, subsequent encounter S32 810V                   Assessment  Impairments: abnormal gait, abnormal or restricted ROM, activity intolerance, impaired physical strength, lacks appropriate home exercise program, pain with function, weight-bearing intolerance, poor posture  and poor body mechanics    Assessment details: Pt continues to demonstrate s/s consistent with R SIJ instability likely secondary to previous disruption of pelvic ring s/p R pubic fami fx in Apr   He demonstrates a fair tolerance to core stabilization, significant lumbopelvic instability, and limited B HA length and ROM discrepancy  He demonstrated an initial good response to trial of SIJ belt during treatment including improved pain, gait mechanics, and pelvic stability during ambulation  He requests d/c to HEP only soon for unspecified reasons and was instructed to purchase OOP SIJ belt and return to PT f/u for 2 more sessions to progress HEP emphasizing core stabilization, improve HA length, and assess response to SIJ belt before d/c to an updated HEP at that time    Understanding of Dx/Px/POC: fair   Prognosis: fair    Goals  STG - 2 more sessions  1) pt will demonstrate good long-term response to SIJ belt  2) pt will demonstrate > 60 deg PSLR  3) pt will be I with updated HEP    Plan  Planned therapy interventions: joint mobilization, manual therapy, neuromuscular re-education, abdominal trunk stabilization, gait training, home exercise program, strengthening, stretching, therapeutic activities and therapeutic exercise  Frequency: 1x week  Duration in weeks: 2  Treatment plan discussed with: patient and family        Subjective Evaluation    History of Present Illness  Mechanism of injury: Pt reports intermittent 7/10 R SIJ pain with WB activities  He reports fair compliance with HEP---only able to complete 1 time per day--but notes increased pain with DLS marching  He returns to PT after a 6-wk absence  Pain  Current pain ratin  At best pain ratin  At worst pain ratin  Progression: no change    Treatments  Previous treatment: physical therapy  Current treatment: physical therapy  Patient Goals  Patient goals for therapy: decreased pain and independence with ADLs/IADLs          Objective     Tenderness     Right Hip   Tenderness in the PSIS  Active Range of Motion     Lumbar   Flexion: 70 degrees   Extension: 5 degrees with pain  Left lateral flexion: 20 degrees   Right lateral flexion: 15 degrees with pain  Left rotation: 60 degrees   Right rotation: 30 degrees with pain    Passive Range of Motion     Right Hip   Flexion: 120 degrees   Abduction: 30 degrees with pain  Adduction: 40 degrees   External rotation (90/90): 45 degrees   Internal rotation (90/90): 20 degrees     Tests     Lumbar     Left   Negative passive SLR (50 deg)  Right   Negative passive SLR (60 deg, painful)  Right Pelvic Girdle/Sacrum   Positive: sacral spring and thigh thrust      Right Hip   Positive Gillet's  Ambulation   Weight-Bearing Status   Assistive device used: single point cane    Ambulation: Level Surfaces   Ambulation with assistive device: independent    Observational Gait   Gait: antalgic   Left stance time and right step length within functional limits  Decreased walking speed, stride length, right stance time, left swing time, right swing time and left step length  Left foot contact pattern: heel to toe  Right foot contact pattern: heel to toe  Base of support: increased    Quality of Movement During Gait   Trunk    Trunk (Left): Positive left lateral lean over stance limb  Pelvis    Pelvis (Right): Positive Trendelenburg       Hip    Hip (Right): Positive circumducted  General Comments     Lumbar Comments  Pt demonstrated improved R stance time, L step-length, and lumbopelvic stability during gait with SPC when wearing SIJ belt          Precautions:  PMHx: HTN, IBS, CAD, aortic aneurysm, s/p TIA  USE GAIT BELT  Dx: R lumbopelvic instability s/p R inf/sup pubic rami fx 4/26/18  Daily Treatment Diary      Manual  6/22 6/28 7/5 7/12 8/23             R hip PROM    5 min  3 min  np  np             R innominate post MET    5"x5  5"x10  5"x10  np             Gr III R innominate AP mobs in SL    1x50  A-P   1x50     A-P,   P-A   inf,sup   1x30    ea  np             R ant/L post innominate rot MET        5"x10  5"x10                                           Exercise Diary  6/22 6/28  7/5 7/12 8/23             SKC 5"x5  np  np  np               Hip ABD/ADD AROM in supine 1x10  1x10  np  np               Supine R hip ER drop-outs    np  np  np               Abdominal bracing 5"x10  5"x10  5"x20  5"x20  5"x20             bridges 1x10  1x10  1x15  1x10   1x5  2x10             Supine clamshells Y/  1x15  Y/   1x15  Y/   1x20  Y/   2x15  Y/   3x15             STS: adjustable table                       Gait training: SPC on floor    1x200'   1x100'  FWW   2x100'  SPC   1x200'   1x100'               Abdominal bracing w/ supine marches    1x15   ea  1x20   ea  2x15  ea  d/c             Abdominal bracing w/ iso hip ADD in hooklying      5"x10  2 2#/   5"x20  2 2#/   5"x20             Back extension machine          30#/   3x10             Supine HA stretch            B/   10"x3

## 2018-08-30 ENCOUNTER — OFFICE VISIT (OUTPATIENT)
Dept: PHYSICAL THERAPY | Facility: CLINIC | Age: 83
End: 2018-08-30
Payer: COMMERCIAL

## 2018-08-30 DIAGNOSIS — S32.591D CLOSED FRACTURE OF MULTIPLE PUBIC RAMI, RIGHT, WITH ROUTINE HEALING, SUBSEQUENT ENCOUNTER: Primary | ICD-10-CM

## 2018-08-30 PROCEDURE — 97112 NEUROMUSCULAR REEDUCATION: CPT

## 2018-08-30 NOTE — PROGRESS NOTES
Daily Note     Today's date: 2018  Patient name: Andres Melendez  : 1933  MRN: [id number]  Referring provider: Edgardo Luis MD  Dx:   Encounter Diagnosis     ICD-10-CM    1  Closed fracture of multiple pubic rami, right, with routine healing, subsequent encounter S32 782P                   Subjective: Pt reports 6/10 right-sided LBP pre tx  Pt reports ordering SI belt but he hasn't received it yet  Objective: See treatment diary below      Assessment: Pt demonstrated fair tolerance to slow progression of lumbar stabilization Chuy  Instructed pt to bring SI belt to next tx session for correct fitting and trialing the belt with gait training  Plan: Continue poc as per PT  Precautions:  PMHx: HTN, IBS, CAD, aortic aneurysm, s/p TIA  USE GAIT BELT  Dx: R lumbopelvic instability s/p R inf/sup pubic rami fx 18  Daily Treatment Diary      Manual             R hip PROM    5 min  3 min  np  np  np           R innominate post MET    5"x5  5"x10  5"x10  np  np           Gr III R innominate AP mobs in SL    1x50  A-P   1x50     A-P,   P-A   inf,sup   1x30    ea  np  np           R ant/L post innominate rot MET        5"x10  5"x10  5"x10                                         Exercise Diary    7           SKC 5"x5  np  np  np               Hip ABD/ADD AROM in supine 1x10  1x10  np  np               Supine R hip ER drop-outs    np  np  np               Abdominal bracing 5"x10  5"x10  5"x20  5"x20  5"x20  5"x20           bridges 1x10  1x10  1x15  1x10   1x5  2x10  3x10           Supine clamshells Y/  1x15  Y/   1x15  Y/   1x20  Y/   2x15  Y/   3x15  Y/  3x18           STS: adjustable table                       Gait training: SPC on floor    1x200'   1x100'  FWW   2x100'  SPC   1x200'   1x100'               Abdominal bracing w/ supine marches    1x15   ea  1x20   ea  2x15  ea  d/c             Abdominal bracing w/ iso hip ADD in hooklying      5"x10  2 2#/   5"x20  2 2#/   5"x20  2 2#  5"x20           Back extension machine          30#/   3x10  30#/  3x10           Supine HA stretch            B/   10"x3

## 2018-09-06 ENCOUNTER — OFFICE VISIT (OUTPATIENT)
Dept: PHYSICAL THERAPY | Facility: CLINIC | Age: 83
End: 2018-09-06
Payer: COMMERCIAL

## 2018-09-06 DIAGNOSIS — S32.591D CLOSED FRACTURE OF MULTIPLE PUBIC RAMI, RIGHT, WITH ROUTINE HEALING, SUBSEQUENT ENCOUNTER: Primary | ICD-10-CM

## 2018-09-06 PROCEDURE — G8982 BODY POS GOAL STATUS: HCPCS | Performed by: PHYSICAL THERAPIST

## 2018-09-06 PROCEDURE — 97112 NEUROMUSCULAR REEDUCATION: CPT | Performed by: PHYSICAL THERAPIST

## 2018-09-06 PROCEDURE — G8981 BODY POS CURRENT STATUS: HCPCS | Performed by: PHYSICAL THERAPIST

## 2018-09-06 NOTE — PROGRESS NOTES
Daily Note     Today's date: 2018  Patient name: Gary Jade  : 1933  MRN: [id number]  Referring provider: Karin Smith MD  Dx:   Encounter Diagnosis     ICD-10-CM    1  Closed fracture of multiple pubic rami, right, with routine healing, subsequent encounter S32 962I                   Subjective: Pt reports mildly improved R SIJ pain with SI belt  Objective: See treatment diary below  Updated HEP, reviewed correct SIJ belt placement    Assessment: Pt demonstrated improved pain with SIJ belt and core stabilization and improved ambulation tolerance and gait stability  Pt requests d/c to HEP at this time  Pt demonstrated fair understanding of updated HEP despite having performed including TE less than 30 minutes previously  Plan: Discharge to [facility] at this time          Precautions:  PMHx: HTN, IBS, CAD, aortic aneurysm, s/p TIA  USE GAIT BELT  Dx: R lumbopelvic instability s/p R inf/sup pubic rami fx 18  Daily Treatment Diary      Manual           R hip PROM    5 min  3 min  np  np  np  np         R innominate post MET    5"x5  5"x10  5"x10  np  np  np         Gr III R innominate AP mobs in SL    1x50  A-P   1x50     A-P,   P-A   inf,sup   1x30    ea  np  np  np         R ant/L post innominate rot MET        5"x10  5"x10  5"x10  5"x10                                       Exercise Diary    7         SKC 5"x5  np  np  np               Hip ABD/ADD AROM in supine 1x10  1x10  np  np               Supine R hip ER drop-outs    np  np  np               Abdominal bracing 5"x10  5"x10  5"x20  5"x20  5"x20  5"x20           bridges 1x10  1x10  1x15  1x10   1x5  2x10  3x10  2x15         Supine clamshells Y/  1x15  Y/   1x15  Y/   1x20  Y/   2x15  Y/   3x15  Y/  3x18  Y/   3x18         STS: adjustable table                       Gait training: [facility] on floor    1x200'   1x100'  FWW   2x100' [facility]   1x200'   1x100'     [facility] 1x300'         Abdominal bracing w/ supine marches    1x15   ea  1x20   ea  2x15  ea  d/c             Abdominal bracing w/ iso hip ADD in hooklying      5"x10  2 2#/   5"x20  2 2#/   5"x20  2 2#  5"x20  4 4#/   5"x15         Back extension machine          30#/   3x10  30#/  3x10  30#/   4x10         Supine HA stretch            B/   10"x3  B/   10"x3

## 2018-10-23 ENCOUNTER — OFFICE VISIT (OUTPATIENT)
Dept: CARDIOLOGY CLINIC | Facility: CLINIC | Age: 83
End: 2018-10-23
Payer: COMMERCIAL

## 2018-10-23 VITALS
HEART RATE: 68 BPM | DIASTOLIC BLOOD PRESSURE: 58 MMHG | WEIGHT: 131 LBS | HEIGHT: 64 IN | BODY MASS INDEX: 22.36 KG/M2 | SYSTOLIC BLOOD PRESSURE: 126 MMHG

## 2018-10-23 DIAGNOSIS — I10 ESSENTIAL HYPERTENSION: Primary | ICD-10-CM

## 2018-10-23 DIAGNOSIS — I25.10 CORONARY ARTERY DISEASE INVOLVING NATIVE CORONARY ARTERY OF NATIVE HEART WITHOUT ANGINA PECTORIS: ICD-10-CM

## 2018-10-23 PROCEDURE — 99213 OFFICE O/P EST LOW 20 MIN: CPT | Performed by: INTERNAL MEDICINE

## 2018-10-23 RX ORDER — FUROSEMIDE 40 MG/1
40 TABLET ORAL DAILY
COMMUNITY
Start: 2018-10-12

## 2018-10-23 RX ORDER — POTASSIUM CHLORIDE 750 MG/1
10 TABLET, FILM COATED, EXTENDED RELEASE ORAL DAILY
COMMUNITY
Start: 2018-10-12

## 2018-10-23 NOTE — PROGRESS NOTES
Cardiology Follow Up    Jorge Alberto Richards  1933  [id number]  [address] [facility] [address] [place] [address] Road    1  Essential hypertension     2  Coronary artery disease involving native coronary artery of native heart without angina pectoris           Discussion/Summary: I reviewed his BP checks that his wife has done on him over the past several months  His BP is well controlled and his SBP is often 110 or sometimes lower than that  I advised him to cut his Norvasc back to 5 mg daily  She will continue to monitor his BP at home and is to alert me if his SBP is consistently above 130 mmHg  No cardiac testing is ordered  Interval History: He has not had any cardiac problems since his last OV  He did have a mechanical fall and is now weaning himself off of using his cane  He denies CP, SOB, palpitations  His Norvasc was increased to 7 5 mg for elevated BP      Patient Active Problem List   Diagnosis    Abnormal weight loss    [facility]    Anemia    Aortic aneurysm (HCC)    Bilateral renal cysts    CKD (chronic kidney disease), stage III ([address] )    Coronary artery disease    Hypertension    Transient ischemic attack    Secondary hyperparathyroidism ([address] )    Closed fracture of multiple pubic rami, right, with routine healing, subsequent encounter     Past Medical History:   Diagnosis Date    Anemia     Aortic aneurysm ([address] )     CAD (coronary artery disease)     Chronic kidney disease     Enlarged prostate     GERD (gastroesophageal reflux disease)     Hard of hearing     Hypercholesteremia     Hyperparathyroidism, secondary ([address] )     Hypertension     IBS (irritable bowel syndrome)     with constipation    Stroke ([facility] [facility] )     TIA    TIA (transient ischemic attack)      Social History     Social History    Marital status: /Civil Union     Spouse name: N/A  Number of children: N/A    Years of education: N/A     Occupational History    Not on file       Social History Main Topics    Smoking status: Former Smoker    Smokeless tobacco: Never Used      Comment: quit 1950's    Alcohol use No    Drug use: No    Sexual activity: Not on file     Other Topics Concern    Not on file     Social History Narrative    No narrative on file      Family History   Problem Relation Age of Onset    No Known Problems Mother     No Known Problems Father      Past Surgical History:   Procedure Laterality Date    APPENDECTOMY      CATARACT EXTRACTION Bilateral     COLONOSCOPY      HERNIA REPAIR      x3    MS REPAIR RECURR INGUIN UNA,REDUCIBL Left 11/15/2017    Procedure: RECURRENT INGUNIAL HERNIA REPAIR;  Surgeon: Alex Banda MD;  [facility];  Service: General    SIGMOIDOSCOPY      TONSILLECTOMY         Current Outpatient Prescriptions:     amLODIPine (NORVASC) 5 mg tablet, Take 1 tablet (5 mg total) by mouth daily (Patient taking differently: Take 7 5 mg by mouth daily Take 1 5 tablets daily ), Disp: 90 tablet, Rfl: 3    aspirin (ECOTRIN LOW STRENGTH) 81 mg EC tablet, Take 81 mg by mouth 2 (two) times a week, Disp: , Rfl:     atorvastatin (LIPITOR) 20 mg tablet, Take 1 tablet by mouth daily, Disp: , Rfl:     Cholecalciferol (VITAMIN D3) 2000 units capsule, Take by mouth daily, Disp: , Rfl:     Linaclotide (LINZESS) 145 MCG CAPS, Take 1 tablet by mouth every other day, Disp: , Rfl:     Multiple Vitamins-Minerals (ONE DAILY MENS 50+/LYCOPENE) TABS, Take 1 tablet by mouth daily, Disp: , Rfl:     Multiple Vitamins-Minerals (PRESERVISION AREDS) TABS, Take 1 tablet by mouth every 12 (twelve) hours, Disp: , Rfl:     omeprazole (PriLOSEC) 40 MG capsule, Take by mouth, Disp: , Rfl:     tamsulosin (FLOMAX) 0 4 mg, Take 1 capsule by mouth daily, Disp: , Rfl:     furosemide (LASIX) 40 mg tablet, , Disp: , Rfl:     Inulin (METAMUCIL CLEAR & NATURAL PO), Take by mouth, Disp: , Rfl:     potassium chloride (K-DUR) 10 mEq tablet, , Disp: , Rfl:   Allergies   Allergen Reactions    Other Rash     Adhesive tape causes a rash     Vitals:    10/23/18 0822   BP: 126/58   BP Location: Left arm   Patient Position: Sitting   Cuff Size: Standard   Pulse: 68   Weight: 59 4 kg (131 lb)   Height: 5' 4" (1 626 m)     Weight (last 2 days)     Date/Time   Weight    10/23/18 0822  59 4 (131)             Blood pressure 126/58, pulse 68, height 5' 4" (1 626 m), weight 59 4 kg (131 lb)  , Body mass index is 22 49 kg/m²      Labs:  Admission on 06/14/2018, Discharged on 06/14/2018   Component Date Value    WBC 06/14/2018 6 61     RBC 06/14/2018 4 42     Hemoglobin 06/14/2018 13 1     Hematocrit 06/14/2018 40 8     MCV 06/14/2018 92     MCH 06/14/2018 29 6     MCHC 06/14/2018 32 1     RDW 06/14/2018 14 4     MPV 06/14/2018 9 3     Platelets 36/31/3516 238     nRBC 06/14/2018 0     Neutrophils Relative 06/14/2018 58     Immat GRANS % 06/14/2018 1     Lymphocytes Relative 06/14/2018 27     Monocytes Relative 06/14/2018 9     Eosinophils Relative 06/14/2018 4     Basophils Relative 06/14/2018 1     Neutrophils Absolute 06/14/2018 3 87     Immature Grans Absolute 06/14/2018 0 03     Lymphocytes Absolute 06/14/2018 1 75     Monocytes Absolute 06/14/2018 0 59     Eosinophils Absolute 06/14/2018 0 29     Basophils Absolute 06/14/2018 0 08     Sodium 06/14/2018 138     Potassium 06/14/2018 4 2     Chloride 06/14/2018 102     CO2 06/14/2018 28     ANION GAP 06/14/2018 8     BUN 06/14/2018 22     Creatinine 06/14/2018 1 40*    Glucose 06/14/2018 84     Calcium 06/14/2018 9 2     eGFR 06/14/2018 45     Troponin I 06/14/2018 <0 02     TSH 3RD GENERATON 06/14/2018 3 190    Appointment on 06/14/2018   Component Date Value    Ventricular Rate 06/14/2018 67     Atrial Rate 06/14/2018 67     ME Interval 06/14/2018 154     QRSD Interval 06/14/2018 86     QT Interval 06/14/2018 386     QTC Interval 06/14/2018 407     P Axis 06/14/2018 54     QRS Axis 06/14/2018 67     T Wave Axis 06/14/2018 65      Imaging: No results found  Review of Systems:  Review of Systems   Constitutional: Negative for diaphoresis, fatigue, fever and unexpected weight change  HENT: Negative  Respiratory: Negative for cough, shortness of breath and wheezing  Cardiovascular: Negative for chest pain, palpitations and leg swelling  Gastrointestinal: Negative for abdominal pain, diarrhea and nausea  Musculoskeletal: Negative for gait problem and myalgias  Skin: Negative for rash  Neurological: Negative for dizziness and numbness  Psychiatric/Behavioral: Negative  Physical Exam:  Physical Exam   Constitutional: He is oriented to person, place, and time  He appears well-developed and well-nourished  HENT:   Head: Normocephalic and atraumatic  Eyes: Pupils are equal, round, and reactive to light  Neck: Normal range of motion  Neck supple  No JVD present  Cardiovascular: Regular rhythm, S1 normal, S2 normal and normal pulses  Pulses:       Carotid pulses are 2+ on the right side, and 2+ on the left side  Pulmonary/Chest: Effort normal and breath sounds normal  He has no wheezes  He has no rales  Abdominal: Soft  Bowel sounds are normal  There is no tenderness  Musculoskeletal: Normal range of motion  He exhibits no edema or tenderness  Neurological: He is alert and oriented to person, place, and time  He has normal reflexes  No cranial nerve deficit  Skin: Skin is warm  Psychiatric: He has a normal mood and affect

## 2018-11-13 ENCOUNTER — APPOINTMENT (OUTPATIENT)
Dept: RADIOLOGY | Facility: CLINIC | Age: 83
End: 2018-11-13
Payer: COMMERCIAL

## 2018-11-13 ENCOUNTER — OFFICE VISIT (OUTPATIENT)
Dept: OBGYN CLINIC | Facility: CLINIC | Age: 83
End: 2018-11-13
Payer: COMMERCIAL

## 2018-11-13 VITALS
HEIGHT: 64 IN | SYSTOLIC BLOOD PRESSURE: 171 MMHG | DIASTOLIC BLOOD PRESSURE: 86 MMHG | HEART RATE: 71 BPM | BODY MASS INDEX: 22.49 KG/M2

## 2018-11-13 DIAGNOSIS — M16.11 PRIMARY OSTEOARTHRITIS OF ONE HIP, RIGHT: Primary | ICD-10-CM

## 2018-11-13 DIAGNOSIS — S32.591D CLOSED FRACTURE OF MULTIPLE PUBIC RAMI, RIGHT, WITH ROUTINE HEALING, SUBSEQUENT ENCOUNTER: ICD-10-CM

## 2018-11-13 PROCEDURE — 73502 X-RAY EXAM HIP UNI 2-3 VIEWS: CPT

## 2018-11-13 PROCEDURE — 99203 OFFICE O/P NEW LOW 30 MIN: CPT | Performed by: ORTHOPAEDIC SURGERY

## 2018-11-13 RX ORDER — METHYLPREDNISOLONE 4 MG/1
TABLET ORAL
Qty: 21 TABLET | Refills: 0 | Status: SHIPPED | OUTPATIENT
Start: 2018-11-13 | End: 2018-12-04

## 2018-11-13 NOTE — PROGRESS NOTES
Patient Name:  Ryley Cast  MRN:  [id number]    Assessment     1  Primary osteoarthritis of one hip, right  Methylprednisolone 4 MG TBPK    FL injection right hip (non arthrogram)   2  Closed fracture of multiple pubic rami, right, with routine healing, subsequent encounter  XR hip/pelv 2-3 vws right if performed       Plan     Right hip DJD  1  Prescription for Medrol Dosepak  2  Referral to Radiology for intra-articular cortisone injection  3  Follow-up as needed      Chief Complaint     Right hip pain      History of the Present Illness     Ryley Cast is a 80 y o  male who reports to the office today for evaluation of his right hip  Patient was treated conservatively by Dr Taty Buckley   for right inferior and superior pubic rami fracture sustained in April 2018  Patient notes a recent flare-up of his right hip pain after a fall on 11/10/18  He notes anterior and lateral based hip pain with radiation distally along the anterior and lateral thigh to the knee  He notes pain with bearing weight and ambulating  He ambulates with a cane  He denies any weakness or instability  No numbness or tingling  No fevers or chills  He takes Tylenol without significant relief  Physical Exam     BP (!) 171/86   Pulse 71   Ht 5' 4" (1 626 m)   BMI 22 49 kg/m²     Right hip:  No gross deformity  No tenderness to palpation anterior hip  Tenderness to palpation noted greater trochanter  Hip range of motion is intact and limited by pain  Negative logroll test   Positive EVERARDO test   Sensation intact right lower extremity  Constitutional:  Well-developed and well-nourished  Eyes:  Anicteric sclerae  Neck:  Supple  Lungs:  Unlabored breathing  Cardiovascular:  Capillary refill is less than 2 seconds  Skin:  Intact without erythema  Neurologic:  Sensation intact to light touch  Psychiatric:  Mood and affect are appropriate        Data Review     I have personally reviewed pertinent films in PACS, and my interpretation follows  X-rays performed today of the right hip reveals healed superior and inferior pubic rami fractures  Degenerative changes noted about the right hip  No new fractures noted        Past Medical History:   Diagnosis Date    Anemia     Aortic aneurysm ([facility] )     CAD (coronary artery disease)     Chronic kidney disease     Enlarged prostate     GERD (gastroesophageal reflux disease)     Hard of hearing     Hypercholesteremia     Hyperparathyroidism, secondary ([facility] )     Hypertension     IBS (irritable bowel syndrome)     with constipation    Stroke ([facility] )     TIA    TIA (transient ischemic attack)        Past Surgical History:   Procedure Laterality Date    APPENDECTOMY      CATARACT EXTRACTION Bilateral     COLONOSCOPY      HERNIA REPAIR      x3    ME REPAIR RECURR INGUIN UNA,REDUCIBL Left 11/15/2017    Procedure: RECURRENT INGUNIAL HERNIA REPAIR;  Surgeon: Vesna Rosales MD;  [facility]: [facility];  Service: General    SIGMOIDOSCOPY      TONSILLECTOMY         Allergies   Allergen Reactions    Other Rash     Adhesive tape causes a rash       Current Outpatient Prescriptions on File Prior to Visit   Medication Sig Dispense Refill    amLODIPine (NORVASC) 5 mg tablet Take 1 tablet (5 mg total) by mouth daily (Patient taking differently: Take 7 5 mg by mouth daily Take 1 5 tablets daily ) 90 tablet 3    aspirin (ECOTRIN LOW STRENGTH) 81 mg EC tablet Take 81 mg by mouth 2 (two) times a week      atorvastatin (LIPITOR) 20 mg tablet Take 1 tablet by mouth daily      Cholecalciferol (VITAMIN D3) 2000 units capsule Take by mouth daily      furosemide (LASIX) 40 mg tablet       Inulin (METAMUCIL CLEAR & NATURAL PO) Take by mouth      Linaclotide (LINZESS) 145 MCG CAPS Take 1 tablet by mouth every other day      Multiple Vitamins-Minerals (ONE DAILY MENS 50+/LYCOPENE) TABS Take 1 tablet by mouth daily      Multiple Vitamins-Minerals (PRESERVISION AREDS) TABS Take 1 tablet by mouth every 12 (twelve) hours      omeprazole (PriLOSEC) 40 MG capsule Take by mouth      potassium chloride (K-DUR) 10 mEq tablet       tamsulosin (FLOMAX) 0 4 mg Take 1 capsule by mouth daily       No current facility-administered medications on file prior to visit  Social History   Substance Use Topics    Smoking status: Former Smoker    Smokeless tobacco: Never Used      Comment: quit 1950's    Alcohol use No       Family History   Problem Relation Age of Onset    No Known Problems Mother     No Known Problems Father          Review of Systems     General:  Negative for fever, lethargy/malaise, or night sweats  Eyes:  Negative for blurry vision or double vision  ENT:  Negative for hearing change, nasal discharge, or sore throat  Hematological:  Negative for bleeding problems or blood clots  Endocrine:  Negative for excessive thirst or temperature intolerance  Respiratory:  Negative for cough or wheezing  Cardiovascular:  Negative for chest pain, dyspnea on exertion, or palpitations  Gastrointestinal:  Negative for abdominal pain, diarrhea, or nausea/vomiting  Musculoskeletal:  As stated in the HPI and otherwise negative  Neurological:  Negative for confusion, headaches, or seizures  Psychological:  Negative for hallucinations or mood swings  Dermatological:  Negative for itching or rash        Scribe Attestation    I,:   Mary Bailey PA-C am acting as a scribe while in the presence of the attending physician :        I,:   Andrew Naqvi MD personally performed the services described in this documentation    as scribed in my presence :

## 2018-11-14 ENCOUNTER — TELEPHONE (OUTPATIENT)
Dept: OBGYN CLINIC | Facility: HOSPITAL | Age: 83
End: 2018-11-14

## 2018-11-14 NOTE — TELEPHONE ENCOUNTER
Caller- patients wife, Hilario Knows  - [phone]  Caller states she wants a call back to discuss her questions about the medications you've prescribed

## 2018-11-19 ENCOUNTER — HOSPITAL ENCOUNTER (OUTPATIENT)
Dept: RADIOLOGY | Facility: HOSPITAL | Age: 83
Discharge: HOME | End: 2018-11-19
Payer: COMMERCIAL

## 2018-11-19 DIAGNOSIS — M16.11 PRIMARY OSTEOARTHRITIS OF ONE HIP, RIGHT: ICD-10-CM

## 2018-11-19 PROCEDURE — 20610 DRAIN/INJ JOINT/BURSA W/O US: CPT

## 2018-11-19 PROCEDURE — 77002 NEEDLE LOCALIZATION BY XRAY: CPT

## 2018-11-19 RX ORDER — BUPIVACAINE HYDROCHLORIDE 2.5 MG/ML
10 INJECTION, SOLUTION EPIDURAL; INFILTRATION; INTRACAUDAL
Status: COMPLETED | OUTPATIENT
Start: 2018-11-19 | End: 2018-11-19

## 2018-11-19 RX ORDER — METHYLPREDNISOLONE ACETATE 80 MG/ML
80 INJECTION, SUSPENSION INTRA-ARTICULAR; INTRALESIONAL; INTRAMUSCULAR; SOFT TISSUE
Status: COMPLETED | OUTPATIENT
Start: 2018-11-19 | End: 2018-11-19

## 2018-11-19 RX ORDER — LIDOCAINE HYDROCHLORIDE 10 MG/ML
15 INJECTION, SOLUTION INFILTRATION; PERINEURAL
Status: COMPLETED | OUTPATIENT
Start: 2018-11-19 | End: 2018-11-19

## 2018-11-19 RX ADMIN — LIDOCAINE HYDROCHLORIDE 7 ML: 10 INJECTION, SOLUTION INFILTRATION; PERINEURAL at 13:45

## 2018-11-19 RX ADMIN — BUPIVACAINE HYDROCHLORIDE 2 ML: 2.5 INJECTION, SOLUTION EPIDURAL; INFILTRATION; INTRACAUDAL; PERINEURAL at 13:45

## 2018-11-19 RX ADMIN — IOHEXOL 4 ML: 300 INJECTION, SOLUTION INTRAVENOUS at 13:45

## 2018-11-19 RX ADMIN — METHYLPREDNISOLONE ACETATE 80 MG: 80 INJECTION, SUSPENSION INTRA-ARTICULAR; INTRALESIONAL; INTRAMUSCULAR; SOFT TISSUE at 13:45

## 2018-12-04 ENCOUNTER — APPOINTMENT (INPATIENT)
Dept: RADIOLOGY | Facility: HOSPITAL | Age: 83
DRG: 470 | End: 2018-12-04
Payer: COMMERCIAL

## 2018-12-04 ENCOUNTER — APPOINTMENT (OUTPATIENT)
Dept: RADIOLOGY | Facility: CLINIC | Age: 83
DRG: 470 | End: 2018-12-04
Payer: COMMERCIAL

## 2018-12-04 ENCOUNTER — OFFICE VISIT (OUTPATIENT)
Dept: OBGYN CLINIC | Facility: CLINIC | Age: 83
End: 2018-12-04
Payer: COMMERCIAL

## 2018-12-04 ENCOUNTER — APPOINTMENT (INPATIENT)
Dept: CT IMAGING | Facility: HOSPITAL | Age: 83
DRG: 470 | End: 2018-12-04
Payer: COMMERCIAL

## 2018-12-04 ENCOUNTER — HOSPITAL ENCOUNTER (INPATIENT)
Facility: HOSPITAL | Age: 83
LOS: 3 days | Discharge: NURSING, REHAB OR LONG-TERM CARE | DRG: 470 | End: 2018-12-07
Attending: ORTHOPAEDIC SURGERY | Admitting: ORTHOPAEDIC SURGERY
Payer: COMMERCIAL

## 2018-12-04 ENCOUNTER — TELEPHONE (OUTPATIENT)
Dept: OBGYN CLINIC | Facility: HOSPITAL | Age: 83
End: 2018-12-04

## 2018-12-04 VITALS
HEIGHT: 64 IN | HEART RATE: 79 BPM | DIASTOLIC BLOOD PRESSURE: 101 MMHG | SYSTOLIC BLOOD PRESSURE: 150 MMHG | BODY MASS INDEX: 22.49 KG/M2

## 2018-12-04 DIAGNOSIS — S72.001A CLOSED DISPLACED FRACTURE OF RIGHT FEMORAL NECK (HCC): Primary | ICD-10-CM

## 2018-12-04 DIAGNOSIS — M16.11 PRIMARY OSTEOARTHRITIS OF ONE HIP, RIGHT: ICD-10-CM

## 2018-12-04 PROBLEM — Z01.818 PRE-OP EVALUATION: Status: ACTIVE | Noted: 2018-12-04

## 2018-12-04 LAB
ABO GROUP BLD: NORMAL
ANION GAP SERPL CALCULATED.3IONS-SCNC: 5 MMOL/L (ref 4–13)
APTT PPP: 29 SECONDS (ref 26–38)
BLD GP AB SCN SERPL QL: NEGATIVE
BUN SERPL-MCNC: 27 MG/DL (ref 5–25)
CALCIUM SERPL-MCNC: 9.4 MG/DL (ref 8.3–10.1)
CHLORIDE SERPL-SCNC: 103 MMOL/L (ref 100–108)
CO2 SERPL-SCNC: 32 MMOL/L (ref 21–32)
CREAT SERPL-MCNC: 1.36 MG/DL (ref 0.6–1.3)
ERYTHROCYTE [DISTWIDTH] IN BLOOD BY AUTOMATED COUNT: 15.3 % (ref 11.6–15.1)
GFR SERPL CREATININE-BSD FRML MDRD: 47 ML/MIN/1.73SQ M
GLUCOSE SERPL-MCNC: 109 MG/DL (ref 65–140)
HCT VFR BLD AUTO: 43.1 % (ref 36.5–49.3)
HGB BLD-MCNC: 13.7 G/DL (ref 12–17)
INR PPP: 1.04 (ref 0.86–1.17)
MCH RBC QN AUTO: 29.3 PG (ref 26.8–34.3)
MCHC RBC AUTO-ENTMCNC: 31.8 G/DL (ref 31.4–37.4)
MCV RBC AUTO: 92 FL (ref 82–98)
PLATELET # BLD AUTO: 237 THOUSANDS/UL (ref 149–390)
PMV BLD AUTO: 10.7 FL (ref 8.9–12.7)
POTASSIUM SERPL-SCNC: 4.9 MMOL/L (ref 3.5–5.3)
PROTHROMBIN TIME: 13.3 SECONDS (ref 11.8–14.2)
RBC # BLD AUTO: 4.68 MILLION/UL (ref 3.88–5.62)
RH BLD: POSITIVE
SODIUM SERPL-SCNC: 140 MMOL/L (ref 136–145)
SPECIMEN EXPIRATION DATE: NORMAL
WBC # BLD AUTO: 10.24 THOUSAND/UL (ref 4.31–10.16)

## 2018-12-04 PROCEDURE — 71046 X-RAY EXAM CHEST 2 VIEWS: CPT

## 2018-12-04 PROCEDURE — 85027 COMPLETE CBC AUTOMATED: CPT | Performed by: PHYSICIAN ASSISTANT

## 2018-12-04 PROCEDURE — 93005 ELECTROCARDIOGRAM TRACING: CPT

## 2018-12-04 PROCEDURE — 80048 BASIC METABOLIC PNL TOTAL CA: CPT | Performed by: PHYSICIAN ASSISTANT

## 2018-12-04 PROCEDURE — 86900 BLOOD TYPING SEROLOGIC ABO: CPT | Performed by: ORTHOPAEDIC SURGERY

## 2018-12-04 PROCEDURE — 86850 RBC ANTIBODY SCREEN: CPT | Performed by: ORTHOPAEDIC SURGERY

## 2018-12-04 PROCEDURE — 99204 OFFICE O/P NEW MOD 45 MIN: CPT | Performed by: ORTHOPAEDIC SURGERY

## 2018-12-04 PROCEDURE — 86901 BLOOD TYPING SEROLOGIC RH(D): CPT | Performed by: ORTHOPAEDIC SURGERY

## 2018-12-04 PROCEDURE — 71250 CT THORAX DX C-: CPT

## 2018-12-04 PROCEDURE — 99222 1ST HOSP IP/OBS MODERATE 55: CPT | Performed by: INTERNAL MEDICINE

## 2018-12-04 PROCEDURE — 85730 THROMBOPLASTIN TIME PARTIAL: CPT | Performed by: PHYSICIAN ASSISTANT

## 2018-12-04 PROCEDURE — 73502 X-RAY EXAM HIP UNI 2-3 VIEWS: CPT

## 2018-12-04 PROCEDURE — 85610 PROTHROMBIN TIME: CPT | Performed by: PHYSICIAN ASSISTANT

## 2018-12-04 RX ORDER — CALCIUM CARBONATE 200(500)MG
1000 TABLET,CHEWABLE ORAL DAILY PRN
Status: DISCONTINUED | OUTPATIENT
Start: 2018-12-04 | End: 2018-12-07 | Stop reason: HOSPADM

## 2018-12-04 RX ORDER — ACETAMINOPHEN 325 MG/1
650 TABLET ORAL EVERY 4 HOURS PRN
Status: DISCONTINUED | OUTPATIENT
Start: 2018-12-04 | End: 2018-12-07 | Stop reason: HOSPADM

## 2018-12-04 RX ORDER — DOCUSATE SODIUM 100 MG/1
100 CAPSULE, LIQUID FILLED ORAL 2 TIMES DAILY
Status: DISCONTINUED | OUTPATIENT
Start: 2018-12-04 | End: 2018-12-07 | Stop reason: HOSPADM

## 2018-12-04 RX ORDER — PANTOPRAZOLE SODIUM 40 MG/1
40 TABLET, DELAYED RELEASE ORAL
Status: DISCONTINUED | OUTPATIENT
Start: 2018-12-05 | End: 2018-12-07 | Stop reason: HOSPADM

## 2018-12-04 RX ORDER — ATORVASTATIN CALCIUM 20 MG/1
20 TABLET, FILM COATED ORAL
Status: DISCONTINUED | OUTPATIENT
Start: 2018-12-04 | End: 2018-12-07 | Stop reason: HOSPADM

## 2018-12-04 RX ORDER — AMLODIPINE BESYLATE 5 MG/1
5 TABLET ORAL DAILY
Status: DISCONTINUED | OUTPATIENT
Start: 2018-12-04 | End: 2018-12-06

## 2018-12-04 RX ORDER — ONDANSETRON 2 MG/ML
4 INJECTION INTRAMUSCULAR; INTRAVENOUS EVERY 6 HOURS PRN
Status: DISCONTINUED | OUTPATIENT
Start: 2018-12-04 | End: 2018-12-07 | Stop reason: HOSPADM

## 2018-12-04 RX ORDER — SODIUM CHLORIDE 9 MG/ML
75 INJECTION, SOLUTION INTRAVENOUS CONTINUOUS
Status: DISCONTINUED | OUTPATIENT
Start: 2018-12-05 | End: 2018-12-05

## 2018-12-04 RX ORDER — PREDNISONE 10 MG/1
TABLET ORAL
COMMUNITY
Start: 2018-12-01 | End: 2019-03-07

## 2018-12-04 RX ORDER — TAMSULOSIN HYDROCHLORIDE 0.4 MG/1
0.4 CAPSULE ORAL DAILY
Status: DISCONTINUED | OUTPATIENT
Start: 2018-12-04 | End: 2018-12-07 | Stop reason: HOSPADM

## 2018-12-04 RX ORDER — OXYCODONE HYDROCHLORIDE 5 MG/1
5 TABLET ORAL EVERY 4 HOURS PRN
Status: DISCONTINUED | OUTPATIENT
Start: 2018-12-04 | End: 2018-12-07 | Stop reason: HOSPADM

## 2018-12-04 RX ORDER — OXYCODONE HYDROCHLORIDE 5 MG/1
2.5 TABLET ORAL EVERY 4 HOURS PRN
Status: DISCONTINUED | OUTPATIENT
Start: 2018-12-04 | End: 2018-12-07 | Stop reason: HOSPADM

## 2018-12-04 RX ADMIN — DOCUSATE SODIUM 100 MG: 100 CAPSULE, LIQUID FILLED ORAL at 20:50

## 2018-12-04 RX ADMIN — TAMSULOSIN HYDROCHLORIDE 0.4 MG: 0.4 CAPSULE ORAL at 14:17

## 2018-12-04 RX ADMIN — OXYCODONE HYDROCHLORIDE 5 MG: 5 TABLET ORAL at 15:34

## 2018-12-04 RX ADMIN — SODIUM CHLORIDE 75 ML/HR: 0.9 INJECTION, SOLUTION INTRAVENOUS at 23:34

## 2018-12-04 RX ADMIN — ATORVASTATIN CALCIUM 20 MG: 20 TABLET, FILM COATED ORAL at 16:43

## 2018-12-04 RX ADMIN — AMLODIPINE BESYLATE 5 MG: 5 TABLET ORAL at 14:17

## 2018-12-04 NOTE — H&P
Patient Name:  Jorge Alberto Richards  MRN:  [id number]       Plan      Right hip displaced femoral neck fracture  1  Patient will be directly admitted to [address] the procedure of a right hip hemiarthroplasty in detail as well as risks and benefits  They have elected to proceed  3  Preoperative clearance will be obtained  4  Plan for surgery tomorrow if medically cleared  NPO after midnight         Subjective      80-year-old male returns to the office today for follow-up regarding his right hip DJ D  He was initially seen on 11/13/18  At that time he was referred to Radiology for an intra-articular right hip cortisone injection  He underwent this injection on 11/19/18  He noted only mild improvement after this injection  He notes increased pain in his right hip and right lower extremity after a fall yesterday  He notes groin pain with radiation distally along the anterior thigh to the knee  He notes pain with bearing weight and movement  He denies any numbness and tingling  No fevers or chills      General ROS:  Negative for fever, lethargy/malaise, or night sweats  Neurological ROS:  Negative for confusion or seizures         Objective      BP (!) 150/101   Pulse 79   Ht 5' 4" (1 626 m)   BMI 22 49 kg/m²      Right hip:  Tenderness to palpation greater trochanter and anterior hip  Hip range of motion is intact and limited in all planes by pain  Positive logroll test   Positive EVERARDO test   Sensation intact right lower extremity  Skin warm and well perfused      Constitutional:  Well-developed and well-nourished  Eyes:  Anicteric sclerae  Neck:  Supple  Lungs:  Unlabored breathing  Cardiovascular:  Capillary refill is less than 2 seconds  Skin:  Intact without erythema  Neurologic:  Sensation intact to light touch    Psychiatric:  Mood and affect are appropriate         Data Review      I have personally reviewed pertinent films in PACS, and my interpretation follows      X-rays performed today of the right femur reveals a displaced and angulated femoral neck fracture     Medical History           Past Medical History:   Diagnosis Date    Anemia      Aortic aneurysm (HCC)      CAD (coronary artery disease)      Chronic kidney disease      Enlarged prostate      GERD (gastroesophageal reflux disease)      Hard of hearing      Hypercholesteremia      Hyperparathyroidism, secondary ([facility] [address] )      Hypertension      IBS (irritable bowel syndrome)       with constipation    Stroke ([facility] )       TIA    TIA (transient ischemic attack)              Surgical History             Past Surgical History:   Procedure Laterality Date    APPENDECTOMY        CATARACT EXTRACTION Bilateral      COLONOSCOPY        HERNIA REPAIR         x3    MN REPAIR RECURR INGUIN UNA,REDUCIBL Left 11/15/2017     Procedure: RECURRENT INGUNIAL HERNIA REPAIR;  Surgeon: Mey Serrato MD;  [facility];  Service: General    SIGMOIDOSCOPY        TONSILLECTOMY                          Allergies   Allergen Reactions    Other Rash       Adhesive tape causes a rash                     Current Outpatient Prescriptions on File Prior to Visit   Medication Sig Dispense Refill    amLODIPine (NORVASC) 5 mg tablet Take 1 tablet (5 mg total) by mouth daily (Patient taking differently: Take 7 5 mg by mouth daily Take 1 5 tablets daily ) 90 tablet 3    aspirin (ECOTRIN LOW STRENGTH) 81 mg EC tablet Take 81 mg by mouth 2 (two) times a week        atorvastatin (LIPITOR) 20 mg tablet Take 1 tablet by mouth daily        Cholecalciferol (VITAMIN D3) 2000 units capsule Take by mouth daily        furosemide (LASIX) 40 mg tablet          Inulin (METAMUCIL CLEAR & NATURAL PO) Take by mouth        Linaclotide (LINZESS) 145 MCG CAPS Take 1 tablet by mouth every other day        Multiple Vitamins-Minerals (ONE DAILY MENS 50+/LYCOPENE) TABS Take 1 tablet by mouth daily        Multiple Vitamins-Minerals (PRESERVISION AREDS) TABS Take 1 tablet by mouth every 12 (twelve) hours        omeprazole (PriLOSEC) 40 MG capsule Take by mouth        potassium chloride (K-DUR) 10 mEq tablet          tamsulosin (FLOMAX) 0 4 mg Take 1 capsule by mouth daily          No current facility-administered medications on file prior to visit                       Social History   Substance Use Topics    Smoking status: Former Smoker    Smokeless tobacco: Never Used         Comment: quit 1950's    Alcohol use No                   Family History   Problem Relation Age of Onset    No Known Problems Mother      No Known Problems Father              Review of Systems      General:  Negative for fever, lethargy/malaise, or night sweats  Eyes:  Negative for blurry vision or double vision  ENT:  Negative for hearing change, nasal discharge, or sore throat  Hematological:  Negative for bleeding problems or blood clots  Endocrine:  Negative for excessive thirst or temperature intolerance  Respiratory:  Negative for cough or wheezing  Cardiovascular:  Negative for chest pain, dyspnea on exertion, or palpitations  Gastrointestinal:  Negative for abdominal pain, diarrhea, or nausea/vomiting  Musculoskeletal:  As stated in the HPI and otherwise negative  Neurological:  Negative for confusion, headaches, or seizures  Psychological:  Negative for hallucinations or mood swings  Dermatological:  Negative for itching or rash        Sloane Newman PA-C

## 2018-12-04 NOTE — PLAN OF CARE
MUSCULOSKELETAL - ADULT     Maintain or return mobility to safest level of function Progressing     Maintain proper alignment of affected body part Progressing        PAIN - ADULT     Verbalizes/displays adequate comfort level or baseline comfort level Progressing        Prexisting or High Potential for Compromised Skin Integrity     Skin integrity is maintained or improved Progressing

## 2018-12-04 NOTE — H&P
Patient Name:  Felisa Burnham  MRN:  [id number]    Assessment & Plan    Right hip femoral neck fracture after fall 12/3/18  1  Admit to med-surg for planned surgery consisting of hemiarthroplasty  2  Bedrest   3  Mechanical DVT prophylaxis for now  4  Internal medicine consult for pre-operative clearance  5  NPO after midnight for possible surgery Wed 12/5 if medically cleared  Chief Complaint    Right hip pain      History of the Present Illness    Felisa Burnham is a 80 y o  male who presents with right hip pain after a fall yesterday  The pain localizes to the anterior groin and is worse with weight bearing  Pain radiates distally into the right lower extremity without numbness or tingling  Review of Systems    General:  Negative for fever, lethargy/malaise, or night sweats  Eyes:  Negative for blurry vision or double vision  ENT:  Negative for hearing change, nasal discharge, or sore throat  Hematological:  Negative for bleeding problems or blood clots  Endocrine:  Negative for excessive thirst or temperature intolerance  Respiratory:  Negative for cough or wheezing  Cardiovascular:  Negative for chest pain, dyspnea on exertion, or palpitations  Gastrointestinal:  Negative for abdominal pain, diarrhea, or nausea/vomiting  Musculoskeletal:  As stated in the HPI and otherwise negative  Neurological:  Negative for confusion, headaches, or seizures  Psychological:  Negative for hallucinations or mood swings  Dermatological:  Negative for itching or rash  Physical Exam    /87 (BP Location: Right arm)   Pulse 68   Temp 98 3 °F (36 8 °C) (Oral)   Resp 18   Ht 5' 4" (1 626 m)   Wt 53 5 kg (117 lb 15 1 oz)   SpO2 97%   BMI 20 25 kg/m²     Right hip: Tenderness to palpation over the greater trochanter  Range of motion is limited by pain with no gross instability  Pain with passive hip rotation  Constitutional:  Well-developed and well-nourished    Eyes:  Anicteric sclerae  Neck:  Supple  Lungs:  Clear to auscultation bilaterally  Heart:  Regular rate and rhythm with audible S1 and S2   Skin:  Intact without erythema  Neurologic:  Sensation intact to light touch  Psychiatric:  Mood and affect are appropriate  Data Review    I have personally reviewed pertinent films in PACS, and my interpretation follows  X-rays right hip 12/4/2018: Displaced femoral neck fracture  I have personally reviewed pertinent lab results  Lab Results   Component Value Date    K 4 9 12/04/2018     12/04/2018    CO2 32 12/04/2018    BUN 27 (H) 12/04/2018    CREATININE 1 36 (H) 12/04/2018     Lab Results   Component Value Date    WBC 10 24 (H) 12/04/2018    HGB 13 7 12/04/2018     12/04/2018     Lab Results   Component Value Date    INR 1 04 12/04/2018    PTT 29 12/04/2018       Past Medical History:   Diagnosis Date    Anemia     Aortic aneurysm (HCC)     CAD (coronary artery disease)     Chronic kidney disease     Enlarged prostate     GERD (gastroesophageal reflux disease)     Hard of hearing     Hypercholesteremia     Hyperparathyroidism, secondary ([address] )     Hypertension     IBS (irritable bowel syndrome)     with constipation    Primary osteoarthritis of one hip, right 12/4/2018    Stroke ([address] )     TIA    TIA (transient ischemic attack)        Past Surgical History:   Procedure Laterality Date    APPENDECTOMY      CATARACT EXTRACTION Bilateral     COLONOSCOPY      FL INJECTION RIGHT HIP (NON ARTHROGRAM)  11/19/2018    HERNIA REPAIR      x3    WI REPAIR RECURR INGUIN UNA,REDUCIBL Left 11/15/2017    Procedure: RECURRENT INGUNIAL HERNIA REPAIR;  Surgeon: Nataly Harris MD;  [facility];  Service: General    SIGMOIDOSCOPY      TONSILLECTOMY         Allergies   Allergen Reactions    Other Rash     Adhesive tape causes a rash       No current facility-administered medications on file prior to encounter        Current Outpatient Prescriptions on File Prior to Encounter   Medication Sig Dispense Refill    amLODIPine (NORVASC) 5 mg tablet Take 1 tablet (5 mg total) by mouth daily (Patient taking differently: Take 7 5 mg by mouth daily Take 1 5 tablets daily ) 90 tablet 3    aspirin (ECOTRIN LOW STRENGTH) 81 mg EC tablet Take 81 mg by mouth 2 (two) times a week      atorvastatin (LIPITOR) 20 mg tablet Take 1 tablet by mouth daily      Cholecalciferol (VITAMIN D3) 2000 units capsule Take by mouth daily      furosemide (LASIX) 40 mg tablet       Inulin (METAMUCIL CLEAR & NATURAL PO) Take by mouth      Linaclotide (LINZESS) 145 MCG CAPS Take 1 tablet by mouth every other day      Multiple Vitamins-Minerals (ONE DAILY MENS 50+/LYCOPENE) TABS Take 1 tablet by mouth daily      Multiple Vitamins-Minerals (PRESERVISION AREDS) TABS Take 1 tablet by mouth every 12 (twelve) hours      omeprazole (PriLOSEC) 40 MG capsule Take by mouth      potassium chloride (K-DUR) 10 mEq tablet       predniSONE 10 mg tablet       tamsulosin (FLOMAX) 0 4 mg Take 1 capsule by mouth daily      [DISCONTINUED] Methylprednisolone 4 MG TBPK Use as directed on package 21 tablet 0       Social History   Substance Use Topics    Smoking status: Former Smoker    Smokeless tobacco: Never Used      Comment: quit 1950's    Alcohol use No       Family History   Problem Relation Age of Onset    No Known Problems Mother     No Known Problems Father

## 2018-12-04 NOTE — TELEPHONE ENCOUNTER
Makayla Steen from radiology  [facility] called to report positive xray findings

## 2018-12-04 NOTE — PROGRESS NOTES
Patient Name:  Brittny Powers  MRN:  [id number]    Assessment     1  Closed displaced fracture of right femoral neck ([facility] [address] )  Transfer to other facility       Plan     Right hip displaced femoral neck fracture  1  Patient will be directly admitted to [address] the procedure of a right hip hemiarthroplasty in detail as well as risks and benefits  They have elected to proceed  3  Preoperative clearance will be obtained  4  Plan for surgery tomorrow if medically cleared  NPO after midnight  Subjective     43-year-old male returns to the office today for follow-up regarding his right hip DJ D  He was initially seen on 11/13/18  At that time he was referred to Radiology for an intra-articular right hip cortisone injection  He underwent this injection on 11/19/18  He noted only mild improvement after this injection  He notes increased pain in his right hip and right lower extremity after a fall yesterday  He notes groin pain with radiation distally along the anterior thigh to the knee  He notes pain with bearing weight and movement  He denies any numbness and tingling  No fevers or chills  General ROS:  Negative for fever, lethargy/malaise, or night sweats  Neurological ROS:  Negative for confusion or seizures  Objective     BP (!) 150/101   Pulse 79   Ht 5' 4" (1 626 m)   BMI 22 49 kg/m²     Right hip:  Tenderness to palpation greater trochanter and anterior hip  Hip range of motion is intact and limited in all planes by pain  Positive logroll test   Positive EVERARDO test   Sensation intact right lower extremity  Skin warm and well perfused  Constitutional:  Well-developed and well-nourished  Eyes:  Anicteric sclerae  Neck:  Supple  Lungs:  Unlabored breathing  Cardiovascular:  Capillary refill is less than 2 seconds  Skin:  Intact without erythema  Neurologic:  Sensation intact to light touch    Psychiatric:  Mood and affect are appropriate  Data Review     I have personally reviewed pertinent films in PACS, and my interpretation follows      X-rays performed today of the right femur reveals a displaced and angulated femoral neck fracture    Medical History        Past Medical History:   Diagnosis Date    Anemia      Aortic aneurysm (HCC)      CAD (coronary artery disease)      Chronic kidney disease      Enlarged prostate      GERD (gastroesophageal reflux disease)      Hard of hearing      Hypercholesteremia      Hyperparathyroidism, secondary ([facility] [address] )      Hypertension      IBS (irritable bowel syndrome)       with constipation    Stroke ([facility] )       TIA    TIA (transient ischemic attack)              Surgical History         Past Surgical History:   Procedure Laterality Date    APPENDECTOMY        CATARACT EXTRACTION Bilateral      COLONOSCOPY        HERNIA REPAIR         x3    WI REPAIR RECURR INGUIN UNA,REDUCIBL Left 11/15/2017     Procedure: RECURRENT INGUNIAL HERNIA REPAIR;  Surgeon: Kendal Clark MD;  [facility];  Service: General    SIGMOIDOSCOPY        TONSILLECTOMY                      Allergies   Allergen Reactions    Other Rash       Adhesive tape causes a rash                Current Outpatient Prescriptions on File Prior to Visit   Medication Sig Dispense Refill    amLODIPine (NORVASC) 5 mg tablet Take 1 tablet (5 mg total) by mouth daily (Patient taking differently: Take 7 5 mg by mouth daily Take 1 5 tablets daily ) 90 tablet 3    aspirin (ECOTRIN LOW STRENGTH) 81 mg EC tablet Take 81 mg by mouth 2 (two) times a week        atorvastatin (LIPITOR) 20 mg tablet Take 1 tablet by mouth daily        Cholecalciferol (VITAMIN D3) 2000 units capsule Take by mouth daily        furosemide (LASIX) 40 mg tablet          Inulin (METAMUCIL CLEAR & NATURAL PO) Take by mouth        Linaclotide (LINZESS) 145 MCG CAPS Take 1 tablet by mouth every other day        Multiple Vitamins-Minerals (ONE DAILY MENS 50+/LYCOPENE) TABS Take 1 tablet by mouth daily        Multiple Vitamins-Minerals (PRESERVISION AREDS) TABS Take 1 tablet by mouth every 12 (twelve) hours        omeprazole (PriLOSEC) 40 MG capsule Take by mouth        potassium chloride (K-DUR) 10 mEq tablet          tamsulosin (FLOMAX) 0 4 mg Take 1 capsule by mouth daily          No current facility-administered medications on file prior to visit                  Social History   Substance Use Topics    Smoking status: Former Smoker    Smokeless tobacco: Never Used         Comment: quit 1950's    Alcohol use No               Family History   Problem Relation Age of Onset    No Known Problems Mother      No Known Problems Father              Review of Systems      General:  Negative for fever, lethargy/malaise, or night sweats  Eyes:  Negative for blurry vision or double vision  ENT:  Negative for hearing change, nasal discharge, or sore throat  Hematological:  Negative for bleeding problems or blood clots  Endocrine:  Negative for excessive thirst or temperature intolerance  Respiratory:  Negative for cough or wheezing  Cardiovascular:  Negative for chest pain, dyspnea on exertion, or palpitations  Gastrointestinal:  Negative for abdominal pain, diarrhea, or nausea/vomiting  Musculoskeletal:  As stated in the HPI and otherwise negative  Neurological:  Negative for confusion, headaches, or seizures  Psychological:  Negative for hallucinations or mood swings  Dermatological:  Negative for itching or rash          Scribe Attestation    I,:   Valerie Ospina PA-C am acting as a scribe while in the presence of the attending physician :        I,:   Brandyn Marcum MD personally performed the services described in this documentation    as scribed in my presence :

## 2018-12-05 ENCOUNTER — APPOINTMENT (INPATIENT)
Dept: RADIOLOGY | Facility: HOSPITAL | Age: 83
DRG: 470 | End: 2018-12-05
Attending: ORTHOPAEDIC SURGERY
Payer: COMMERCIAL

## 2018-12-05 ENCOUNTER — ANESTHESIA EVENT (INPATIENT)
Dept: PERIOP | Facility: HOSPITAL | Age: 83
DRG: 470 | End: 2018-12-05
Payer: COMMERCIAL

## 2018-12-05 ENCOUNTER — ANESTHESIA (INPATIENT)
Dept: PERIOP | Facility: HOSPITAL | Age: 83
DRG: 470 | End: 2018-12-05
Payer: COMMERCIAL

## 2018-12-05 LAB
ANION GAP SERPL CALCULATED.3IONS-SCNC: 5 MMOL/L (ref 4–13)
BUN SERPL-MCNC: 25 MG/DL (ref 5–25)
CALCIUM SERPL-MCNC: 9.1 MG/DL (ref 8.3–10.1)
CHLORIDE SERPL-SCNC: 104 MMOL/L (ref 100–108)
CO2 SERPL-SCNC: 30 MMOL/L (ref 21–32)
CREAT SERPL-MCNC: 1.26 MG/DL (ref 0.6–1.3)
ERYTHROCYTE [DISTWIDTH] IN BLOOD BY AUTOMATED COUNT: 15.2 % (ref 11.6–15.1)
GFR SERPL CREATININE-BSD FRML MDRD: 52 ML/MIN/1.73SQ M
GLUCOSE SERPL-MCNC: 94 MG/DL (ref 65–140)
HCT VFR BLD AUTO: 39.3 % (ref 36.5–49.3)
HGB BLD-MCNC: 12.9 G/DL (ref 12–17)
MCH RBC QN AUTO: 29.7 PG (ref 26.8–34.3)
MCHC RBC AUTO-ENTMCNC: 32.8 G/DL (ref 31.4–37.4)
MCV RBC AUTO: 91 FL (ref 82–98)
PLATELET # BLD AUTO: 218 THOUSANDS/UL (ref 149–390)
PMV BLD AUTO: 8.6 FL (ref 8.9–12.7)
POTASSIUM SERPL-SCNC: 4.3 MMOL/L (ref 3.5–5.3)
RBC # BLD AUTO: 4.34 MILLION/UL (ref 3.88–5.62)
SODIUM SERPL-SCNC: 139 MMOL/L (ref 136–145)
WBC # BLD AUTO: 9.58 THOUSAND/UL (ref 4.31–10.16)

## 2018-12-05 PROCEDURE — 0SRR0JA REPLACEMENT OF RIGHT HIP JOINT, FEMORAL SURFACE WITH SYNTHETIC SUBSTITUTE, UNCEMENTED, OPEN APPROACH: ICD-10-PCS | Performed by: ORTHOPAEDIC SURGERY

## 2018-12-05 PROCEDURE — 27236 TREAT THIGH FRACTURE: CPT | Performed by: ORTHOPAEDIC SURGERY

## 2018-12-05 PROCEDURE — C1776 JOINT DEVICE (IMPLANTABLE): HCPCS | Performed by: ORTHOPAEDIC SURGERY

## 2018-12-05 PROCEDURE — 80048 BASIC METABOLIC PNL TOTAL CA: CPT | Performed by: PHYSICIAN ASSISTANT

## 2018-12-05 PROCEDURE — 85027 COMPLETE CBC AUTOMATED: CPT | Performed by: PHYSICIAN ASSISTANT

## 2018-12-05 PROCEDURE — 99024 POSTOP FOLLOW-UP VISIT: CPT | Performed by: PHYSICIAN ASSISTANT

## 2018-12-05 PROCEDURE — 73501 X-RAY EXAM HIP UNI 1 VIEW: CPT

## 2018-12-05 PROCEDURE — 99232 SBSQ HOSP IP/OBS MODERATE 35: CPT | Performed by: INTERNAL MEDICINE

## 2018-12-05 DEVICE — TRI-LOCK BPS FEMORAL STEM 12/14 TAPER TRI-LOCK BPS W/GRIPTION SIZE 9 HI 113MM
Type: IMPLANTABLE DEVICE | Site: HIP | Status: FUNCTIONAL
Brand: TRI-LOCK GRIPTION

## 2018-12-05 DEVICE — ARTICUL/EZE FEMORAL HEAD DIAMETER 28MM +1.5 12/14 TAPER
Type: IMPLANTABLE DEVICE | Site: HIP | Status: FUNCTIONAL
Brand: ARTICUL/EZE

## 2018-12-05 DEVICE — SELF CENTERING BI-POLAR HEAD 28MM ID 52MM OD
Type: IMPLANTABLE DEVICE | Site: HIP | Status: FUNCTIONAL
Brand: SELF CENTERING

## 2018-12-05 RX ORDER — PROPOFOL 10 MG/ML
INJECTION, EMULSION INTRAVENOUS AS NEEDED
Status: DISCONTINUED | OUTPATIENT
Start: 2018-12-05 | End: 2018-12-05 | Stop reason: SURG

## 2018-12-05 RX ORDER — METOCLOPRAMIDE HYDROCHLORIDE 5 MG/ML
10 INJECTION INTRAMUSCULAR; INTRAVENOUS ONCE AS NEEDED
Status: DISCONTINUED | OUTPATIENT
Start: 2018-12-05 | End: 2018-12-05 | Stop reason: HOSPADM

## 2018-12-05 RX ORDER — CEFAZOLIN SODIUM 1 G/50ML
1000 SOLUTION INTRAVENOUS EVERY 8 HOURS
Status: COMPLETED | OUTPATIENT
Start: 2018-12-06 | End: 2018-12-06

## 2018-12-05 RX ORDER — MAGNESIUM HYDROXIDE 1200 MG/15ML
LIQUID ORAL AS NEEDED
Status: DISCONTINUED | OUTPATIENT
Start: 2018-12-05 | End: 2018-12-05 | Stop reason: HOSPADM

## 2018-12-05 RX ORDER — FENTANYL CITRATE 50 UG/ML
INJECTION, SOLUTION INTRAMUSCULAR; INTRAVENOUS AS NEEDED
Status: DISCONTINUED | OUTPATIENT
Start: 2018-12-05 | End: 2018-12-05 | Stop reason: SURG

## 2018-12-05 RX ORDER — ONDANSETRON 2 MG/ML
INJECTION INTRAMUSCULAR; INTRAVENOUS AS NEEDED
Status: DISCONTINUED | OUTPATIENT
Start: 2018-12-05 | End: 2018-12-05 | Stop reason: SURG

## 2018-12-05 RX ORDER — ONDANSETRON 2 MG/ML
4 INJECTION INTRAMUSCULAR; INTRAVENOUS ONCE AS NEEDED
Status: DISCONTINUED | OUTPATIENT
Start: 2018-12-05 | End: 2018-12-05 | Stop reason: HOSPADM

## 2018-12-05 RX ORDER — LIDOCAINE HYDROCHLORIDE 10 MG/ML
INJECTION, SOLUTION INFILTRATION; PERINEURAL AS NEEDED
Status: DISCONTINUED | OUTPATIENT
Start: 2018-12-05 | End: 2018-12-05 | Stop reason: SURG

## 2018-12-05 RX ORDER — HYDROMORPHONE HCL/PF 1 MG/ML
0.5 SYRINGE (ML) INJECTION
Status: DISCONTINUED | OUTPATIENT
Start: 2018-12-05 | End: 2018-12-05 | Stop reason: HOSPADM

## 2018-12-05 RX ORDER — CEFAZOLIN SODIUM 1 G/50ML
1000 SOLUTION INTRAVENOUS ONCE
Status: COMPLETED | OUTPATIENT
Start: 2018-12-05 | End: 2018-12-05

## 2018-12-05 RX ORDER — SODIUM CHLORIDE 9 MG/ML
75 INJECTION, SOLUTION INTRAVENOUS CONTINUOUS
Status: DISCONTINUED | OUTPATIENT
Start: 2018-12-05 | End: 2018-12-07

## 2018-12-05 RX ORDER — SODIUM CHLORIDE 9 MG/ML
INJECTION, SOLUTION INTRAVENOUS CONTINUOUS PRN
Status: DISCONTINUED | OUTPATIENT
Start: 2018-12-05 | End: 2018-12-05 | Stop reason: SURG

## 2018-12-05 RX ADMIN — SODIUM CHLORIDE 75 ML/HR: 0.9 INJECTION, SOLUTION INTRAVENOUS at 19:44

## 2018-12-05 RX ADMIN — DEXAMETHASONE SODIUM PHOSPHATE 4 MG: 10 INJECTION INTRAMUSCULAR; INTRAVENOUS at 17:09

## 2018-12-05 RX ADMIN — FENTANYL CITRATE 25 MCG: 50 INJECTION INTRAMUSCULAR; INTRAVENOUS at 17:11

## 2018-12-05 RX ADMIN — ONDANSETRON 4 MG: 2 INJECTION INTRAMUSCULAR; INTRAVENOUS at 17:09

## 2018-12-05 RX ADMIN — DOCUSATE SODIUM 100 MG: 100 CAPSULE, LIQUID FILLED ORAL at 08:05

## 2018-12-05 RX ADMIN — CEFAZOLIN SODIUM 1000 MG: 1 SOLUTION INTRAVENOUS at 16:47

## 2018-12-05 RX ADMIN — AMLODIPINE BESYLATE 5 MG: 5 TABLET ORAL at 08:08

## 2018-12-05 RX ADMIN — HYDROMORPHONE HYDROCHLORIDE 0.5 MG: 1 INJECTION, SOLUTION INTRAMUSCULAR; INTRAVENOUS; SUBCUTANEOUS at 18:43

## 2018-12-05 RX ADMIN — OXYCODONE HYDROCHLORIDE 5 MG: 5 TABLET ORAL at 10:25

## 2018-12-05 RX ADMIN — FENTANYL CITRATE 50 MCG: 50 INJECTION INTRAMUSCULAR; INTRAVENOUS at 17:31

## 2018-12-05 RX ADMIN — SODIUM CHLORIDE 75 ML/HR: 0.9 INJECTION, SOLUTION INTRAVENOUS at 13:34

## 2018-12-05 RX ADMIN — FENTANYL CITRATE 25 MCG: 50 INJECTION INTRAMUSCULAR; INTRAVENOUS at 18:09

## 2018-12-05 RX ADMIN — OXYCODONE HYDROCHLORIDE 5 MG: 5 TABLET ORAL at 19:36

## 2018-12-05 RX ADMIN — SODIUM CHLORIDE: 0.9 INJECTION, SOLUTION INTRAVENOUS at 16:36

## 2018-12-05 RX ADMIN — PANTOPRAZOLE SODIUM 40 MG: 40 TABLET, DELAYED RELEASE ORAL at 05:15

## 2018-12-05 RX ADMIN — HYDROMORPHONE HYDROCHLORIDE 0.5 MG: 1 INJECTION, SOLUTION INTRAMUSCULAR; INTRAVENOUS; SUBCUTANEOUS at 18:53

## 2018-12-05 RX ADMIN — LIDOCAINE HYDROCHLORIDE ANHYDROUS 50 MG: 10 INJECTION, SOLUTION INFILTRATION at 16:56

## 2018-12-05 RX ADMIN — PROPOFOL 100 MG: 10 INJECTION, EMULSION INTRAVENOUS at 16:56

## 2018-12-05 RX ADMIN — TAMSULOSIN HYDROCHLORIDE 0.4 MG: 0.4 CAPSULE ORAL at 08:05

## 2018-12-05 NOTE — OP NOTE
OPERATIVE REPORT  PATIENT NAME: Kanchan Piper    :  1933  MRN: [id number]  Pt Location: AN OR ROOM 04    SURGERY DATE: 2018    Surgeon(s) and Role:     * Ama Antoine MD - Primary    Pre-Op Diagnosis Codes:     * Closed displaced fracture of right femoral neck ([facility] [address] ) [S72 001A]    Post-Op Diagnosis Codes:     * Closed displaced fracture of right femoral neck ([address] ) [S72 001A]    Procedure(s) (LRB):  RIGHT HIP HEMIARTHROPLASTY    Specimen(s):  * No specimens in log *    Estimated Blood Loss:   Minimal    Anesthesia Type:   General    Complications:   None    Procedure and Technique:  The patient was identified in the preoperative holding area, and the surgical site was marked by the surgeon  The patient was transported to the operating room and transferred to the OR table  General anesthesia was administered, and the patient was intubated  The patient was then placed in the lateral decubitus position  The right hip was prepped and draped in the usual sterile fashion  Prophylactic antibiotics were given within 1 hour of incision  Following a surgical timeout, a 12 cm posterolateral hip incision was made with the 10-blade scalpel  Deep dissection was carried out sharply to the fascia nicole, which was incised using the Bovie and extended proximally and distally with the Pat scissors  The Charnley retractor was placed  The trochanteric bursa was excised  The short external rotators and posterior capsule were incised in a single layer  The posterior capsule was incised along the femoral neck to the acetabular rim, exposing the femoral neck fracture  Double angled Hohmann retractors were placed around the femoral neck, and a femoral neck cut was made with the oscillating saw  The acetabulum was irrigated with sterile saline solution using the pulse   A retractor was placed under the femoral neck  The box osteotome was passed through the lateral calcar    The femur was reamed manually with the T-handled reamer  The femur was then broached using the 150 [place] system  Trial neck and bipolar femoral head components were placed, and the hip was reduced  Optimal stability and leg length was observed with a high-offset neck and 28 mm ID/52 mm OD/+1 5 mm femoral head  The trial components were removed, and the hip was irrigated extensively with sterile saline solution using the pulse   The surgeon's outer gloves were changed  The Depuy Trilock femoral component with bipolar femoral head was inserted  The femoral head was confirmed to be secure, and the hip was reduced  Stability and leg length remained satisfactory  The hip was irrigated with sterile saline solution using the pulse   The posterior capsule was repaired with side-to-side #1 Vicryl suture in an interrupted figure-of-eight fashion  The posterior capsule and short external rotators were repaired in a combined layer through drill holes in the greater trochanter using #5 Ethibond suture  The fascia nicole was closed using #1 Vicryl suture in an interrupted figure-of-eight fashion  The deep soft tissue layer was closed with #1 Vicryl suture in a simple interrupted fashion  The subcuticular layer was closed with simple buried 2-0 Vicryl suture, and the skin was closed with staples  A Mepilex dressing was applied  A hip abduction pillow was placed  General anesthesia was reversed, and the patient was extubated  The patient was transferred to the stretcher and transported to the recovery area in stable condition  I was present for the entire procedure      Patient Disposition:  PACU  and extubated and stable    SIGNATURE: Ania Toscano MD  DATE: December 5, 2018  TIME: 4:57 PM

## 2018-12-05 NOTE — PROGRESS NOTES
Orthopedics   Ebenezer Davilaraven 80 y o  male MRN: [id number]  Unit/Bed#: -01      Subjective:  Pt seen this am  Denies any right hip pain at this time  Pain is brought on with movement  No numbness/tingling  No fever/chills    Labs:    0  Lab Value Date/Time   HCT 39 3 12/05/2018 0632   HCT 43 1 12/04/2018 1410   HCT 40 8 06/14/2018 1906   HCT 40 7 10/15/2015 1249   HCT 40 3 09/01/2015 0725   HCT 38 1 06/23/2015 1102   HGB 12 9 12/05/2018 0632   HGB 13 7 12/04/2018 1410   HGB 13 1 06/14/2018 1906   HGB 13 2 10/15/2015 1249   HGB 12 7 09/01/2015 0725   HGB 12 4 06/23/2015 1102   INR 1 04 12/04/2018 1410   INR 1 12 01/09/2015 1455   WBC 9 58 12/05/2018 0632   WBC 10 24 (H) 12/04/2018 1410   WBC 6 61 06/14/2018 1906   WBC 7 13 10/15/2015 1249   WBC 7 13 09/01/2015 0725   WBC 6 51 06/23/2015 1102   ESR 7 06/23/2015 1102       Meds:    Current Facility-Administered Medications:     acetaminophen (TYLENOL) tablet 650 mg, 650 mg, Oral, Q4H PRN, JOVANNA Bedoya-CATHY    amLODIPine (NORVASC) tablet 5 mg, 5 mg, Oral, Daily, Romeo Dariel PA-C, 5 mg at 12/05/18 0520    atorvastatin (LIPITOR) tablet 20 mg, 20 mg, Oral, Daily With David Lagunas PA-C, 20 mg at 12/04/18 1643    calcium carbonate (TUMS) chewable tablet 1,000 mg, 1,000 mg, Oral, Daily PRN, Johanne Vieira PA-CATHY    docusate sodium (COLACE) capsule 100 mg, 100 mg, Oral, BID, Johanne Vieira PA-C, 100 mg at 12/05/18 0805    Linaclotide CAPS 145 mcg, 145 mcg, Oral, Every Other Day, Johanne Vieira PA-C    morphine injection 1 mg, 1 mg, Intravenous, Q4H PRN, Johanne Bearded, PA-C    ondansetron [facility]) injection 4 mg, 4 mg, Intravenous, Q6H PRN, Johanne Bearded, PA-C    oxyCODONE (ROXICODONE) IR tablet 2 5 mg, 2 5 mg, Oral, Q4H PRN, Johanne Bearded, PA-C    oxyCODONE (ROXICODONE) IR tablet 5 mg, 5 mg, Oral, Q4H PRN, Romeo Bearded, PA-C, 5 mg at 12/04/18 1534    pantoprazole (PROTONIX) EC tablet 40 mg, 40 mg, Oral, Early Morning, Romeo Bearded, PA-C, 40 mg at 12/05/18 0534   sodium chloride 0 9 % infusion, 75 mL/hr, Intravenous, Continuous, Kiya Nash PA-C, Last Rate: 75 mL/hr at 12/04/18 2334, 75 mL/hr at 12/04/18 2334    tamsulosin (FLOMAX) capsule 0 4 mg, 0 4 mg, Oral, Daily, Kiya Nash PA-C, 0 4 mg at 12/05/18 0805      Physical exam:  Vitals:    12/05/18 0808   BP: 170/75   Pulse:    Resp:    Temp:    SpO2:      Right Hip:  · Skin Intact  · RLE shortened slightly  · TTP greater troch  · Motor/Sensation intact RLE  · Palpable DP Pulse    _*_*_*_*_*_*_*_*_*_*_*_*_*_*_*_*_*_*_*_*_*_*_*_*_*_*_*_*_*_*_*_*_*_*_*_*_*_*_*_*_*    Assessment: 80 y  o male with right femoral neck fracture    Plan:  · Pain Control  · NWB RLE/Bedrest  · NPO  · For OR today for Right hip hemiarthroplasty  · Pt is medically cleared      Kiya Nash PA-C

## 2018-12-05 NOTE — ANESTHESIA POSTPROCEDURE EVALUATION
Post-Op Assessment Note      CV Status:  Stable    Mental Status:  Alert and awake    Hydration Status:  Euvolemic    PONV Controlled:  Controlled    Airway Patency:  Patent    Post Op Vitals Reviewed: Yes          Staff: CRNA       Comments: vss sv nonobstructed uneventful          BP   135/80   Temp 98 1   Pulse 58   Resp 18   SpO2 98

## 2018-12-05 NOTE — UTILIZATION REVIEW
Initial Clinical Review    Admission: Date/Time/Statement: 12/4/18 @ 1313     Orders Placed This Encounter   Procedures    Inpatient Admission     Standing Status:   Standing     Number of Occurrences:   1     Order Specific Question:   Admitting Physician     Answer:   Marina Aguayo [[id number]]     Order Specific Question:   Level of Care     Answer:   Med Surg [[id number]]     Order Specific Question:   Estimated length of stay     Answer:   More than 2 Midnights     Order Specific Question:   Certification     Answer:   I certify that inpatient services are medically necessary for this patient for a duration of greater than two midnights  See H&P and MD Progress Notes for additional information about the patient's course of treatment  Chief Complaint: right hip pain  History of Illness: direct admission - patient went to orthopedic office with complaints of increasing hip pain after a fall yesterday  He had been previously treated for DJD and had cortisone injection on 11/19  Xray revealed ad displaced and angulated femoral neck fracture  ED Vital Signs:   ED Triage Vitals   Temperature Pulse Respirations Blood Pressure SpO2   12/04/18 1353 12/04/18 1353 12/04/18 1353 12/04/18 1353 12/04/18 1353   98 3 °F (36 8 °C) 68 18 146/87 97 %      Temp Source Heart Rate Source Patient Position - Orthostatic VS BP Location FiO2 (%)   12/04/18 1353 12/05/18 0700 12/04/18 1353 12/04/18 1353 --   Oral Monitor Lying Right arm       Pain Score       --               Wt Readings from Last 1 Encounters:   12/04/18 53 5 kg (117 lb 15 1 oz)       Vital Signs (abnormal): maximum /83    Abnormal Labs/Diagnostic Test Results:   Bun 27  Creatinine 1 36  Wbc 10 24, hgb 13 7, hct 43 1    Xray right hip -  Acute fracture of the right femoral neck  2   Healing fractures of the right superior and inferior pubic rami  Ct chest - Stable descending thoracic aortic aneurysm measuring 4 7 cm just proximal to the diaphragm    2   No acute cardiopulmonary process  12/5/2018-  Wbc 9 58, hgb 12 9, hct 39 3  Past Medical/Surgical History:   Past Medical History:   Diagnosis Date    Anemia     Aortic aneurysm ([facility] )     CAD (coronary artery disease)     Chronic kidney disease     Enlarged prostate     GERD (gastroesophageal reflux disease)     Hard of hearing     Hypercholesteremia     [facility]     Hypertension     IBS (irritable bowel syndrome)     Primary osteoarthritis of one hip, right 12/4/2018    Stroke ([facility] )     TIA (transient ischemic attack)        Admitting Diagnosis: Closed displaced fracture of right femoral neck (HCC)    Age/Sex: 80 y o  male    Assessment/Plan: This is an 80year old male with past medical history of CAD, hypertension, aortic aneursym and HDL, who presented to orthopedic office with increased  hip  Pain following a fall  and was found to have displaced and angulated femoral neck fracture    Patient was admitted to hospital with  Right hip femoral fracture, plan is surgery consisting of hemiarthroplasty, pain control, medicine consult    Admission Orders:  12/4/2018  1330 INPATIENT   Scheduled Meds:   Current Facility-Administered Medications:  acetaminophen 650 mg Oral Q4H PRN    amLODIPine 5 mg Oral Daily    atorvastatin 20 mg Oral Daily With Dinner    calcium carbonate 1,000 mg Oral Daily PRN    docusate sodium 100 mg Oral BID    enoxaparin 30 mg Subcutaneous Q24H [facility]    Linaclotide 145 mcg Oral Every Other Day    morphine injection 1 mg Intravenous Q4H PRN    ondansetron 4 mg Intravenous Q6H PRN    oxyCODONE 2 5 mg Oral Q4H PRN    oxyCODONE 5 mg Oral Q4H PRN    pantoprazole 40 mg Oral Early Morning    sodium chloride 75 mL/hr Intravenous Continuous Last Rate: 75 mL/hr (12/04/18 2334)   tamsulosin 0 4 mg Oral Daily      Continuous Infusions:   sodium chloride 75 mL/hr Last Rate: 75 mL/hr (12/04/18 2334)     PRN Meds: oxyCODONE  5 mg - used x 2    OTHER ORDERS: scds  Non weight bearing RLE  NPO after midnight   Katie Raymond [address] Utilization Review Department  Phone: [phone]; Fax [phone]  [email or web address]  ATTENTION: Please call with any questions or concerns to [phone]  and carefully listen to the prompts so that you are directed to the right person  Send all requests for admission clinical reviews, approved or denied determinations and any other requests to fax [phone]   All voicemails are confidential

## 2018-12-05 NOTE — PROGRESS NOTES
Consult Progress Note - Kanchan Piper 1933, 80 y o  male MRN: [id number]    Unit/Bed#: -Marii Encounter: [id number]    Primary Care Provider: Walton Holstein, MD   Date and time admitted to hospital: 2018  1:13 PM    Pre-op evaluation   Assessment & Plan    80 male with no active cardiac issues and known HTN/ HLD and reported CAD but never had cath per pt  Reports good functional capacity of well over >4 METS, manages stairs w/o problem  note thoracic Aortic Aneurysm, non surgical per pt based on previous follow up with Dr Leny Manzano  Overall moderate risk, probably non modifiable risk factors  Tolerated anesthesia 2 years ago for inguinal Hernia repair  rpt CT chest  - stable 4 7 aortic aneurysm; EKG -nml  Medically ready for OR, further testing will not      Hypertension   Assessment & Plan    Stable  amlodipine     Aortic aneurysm (HCC)   Assessment & Plan    Last imagine thoracic AA 4 7*4 4 cm  Stable CT chest     * Closed displaced fracture of right femoral neck (HCC)   Assessment & Plan    For ORIF per orthopedics         VTE Pharmacologic Prophylaxis: Enoxaparin (Lovenox)  Mechanical: Mechanical VTE prophylaxis in place  Subjective: nil acute    Objective:     Vitals:   Temp (24hrs), Av 9 °F (36 6 °C), Min:97 4 °F (36 3 °C), Max:98 3 °F (36 8 °C)    Temp:  [97 4 °F (36 3 °C)-98 3 °F (36 8 °C)] 97 4 °F (36 3 °C)  HR:  [63-79] 63  Resp:  [16-18] 16  BP: (139-171)/() 170/75  SpO2:  [96 %-97 %] 97 %  Body mass index is 20 25 kg/m²  Input and Output Summary (last 24 hours): Intake/Output Summary (Last 24 hours) at 18 0853  Last data filed at 18 0801   Gross per 24 hour   Intake                0 ml   Output              775 ml   Net             -775 ml       Physical Exam   Constitutional: He is oriented to person, place, and time  Cardiovascular: Normal heart sounds  Pulmonary/Chest: Effort normal    Abdominal: Soft   Bowel sounds are normal    Neurological: He is alert and oriented to person, place, and time  Additional Data:     Labs:      Results from last 7 days  Lab Units 12/05/18  0632   WBC Thousand/uL 9 58   HEMOGLOBIN g/dL 12 9   HEMATOCRIT % 39 3   PLATELETS Thousands/uL 218       Results from last 7 days  Lab Units 12/05/18  9244   POTASSIUM mmol/L 4 3   CHLORIDE mmol/L 104   CO2 mmol/L 30   BUN mg/dL 25   CREATININE mg/dL 1 26   CALCIUM mg/dL 9 1       Results from last 7 days  Lab Units 12/04/18  1410   INR  1 04       * I Have Reviewed All Lab Data Listed Above  Imaging:  Imaging Personally Reviewed by Myself Includes:     Cultures:   Blood Culture: No results found for: BLOODCX  Urine Culture:   Lab Results   Component Value Date    URINECX No Growth <1000 cfu/mL 07/15/2016     Sputum Culture: No components found for: SPUTUMCX  Wound Culture: No results found for: WOUNDCULT    Last 24 Hours Medication List:     Current Facility-Administered Medications:  acetaminophen 650 mg Oral Q4H PRN Thresea Broad, PA-C    amLODIPine 5 mg Oral Daily Thresea Broad, PA-C    atorvastatin 20 mg Oral Daily With [employer], PA-C    calcium carbonate 1,000 mg Oral Daily PRN Thresea Broad, PA-C    docusate sodium 100 mg Oral BID Thresea Broad, PA-C    Linaclotide 145 mcg Oral Every Other Day Thresea Broad, PA-C    morphine injection 1 mg Intravenous Q4H PRN Thresea Broad, PA-C    ondansetron 4 mg Intravenous Q6H PRN Thresea Broad, PA-C    oxyCODONE 2 5 mg Oral Q4H PRN Thresea Broad, PA-C    oxyCODONE 5 mg Oral Q4H PRN Thresea Broad, PA-C    pantoprazole 40 mg Oral Early Morning Thresea Broad, PA-C    sodium chloride 75 mL/hr Intravenous Continuous Thresea Broad, PA-C Last Rate: 75 mL/hr (12/04/18 2334)   tamsulosin 0 4 mg Oral Daily Thresea Broad, PA-C         Today, Patient Was Seen By: Peg Valadez MD    ** Please Note: Dragon 360 Dictation voice to text software may have been used in the creation of this document   **

## 2018-12-05 NOTE — ANESTHESIA PREPROCEDURE EVALUATION
Review of Systems/Medical History  Patient summary reviewed    No history of anesthetic complications     Cardiovascular  EKG reviewed, Exercise tolerance (METS): >4,  Hyperlipidemia, Hypertension controlled, CAD ,   Comment: 03/01/16  SUMMARY     PROCEDURE INFORMATION:  This was a technically difficult study      LEFT VENTRICLE:  Size was normal   Systolic function was normal  Ejection fraction was estimated to be 55 %  Wall thickness was mildly increased  Doppler parameters were consistent with abnormal left ventricular relaxation  (grade 1 diastolic dysfunction)      RIGHT VENTRICLE:  The size was normal   Systolic function was normal      MITRAL VALVE:  There was mild regurgitation      TRICUSPID VALVE:  There was mild regurgitation  ,  Pulmonary  Smoker ex-smoker  ,        GI/Hepatic    GERD well controlled,        Negative  ROS        Endo/Other  Negative endo/other ROS      GYN       Hematology  Anemia ,     Musculoskeletal    Arthritis     Neurology    TIA, CVA , no residual symptoms,    Psychology           Physical Exam    Airway    Mallampati score: I  TM Distance: >3 FB  Neck ROM: full     Dental   upper dentures,     Cardiovascular  Rhythm: regular, Rate: normal,     Pulmonary  Breath sounds clear to auscultation,     Other Findings        Anesthesia Plan  ASA Score- 3     Anesthesia Type- general with ASA Monitors  Additional Monitors:   Airway Plan:         Plan Factors-  Patient did not smoke on day of surgery  Induction- intravenous  Postoperative Plan- Plan for postoperative opioid use  Planned trial extubation    Informed Consent- Anesthetic plan and risks discussed with patient  I personally reviewed this patient with the CRNA  Discussed and agreed on the Anesthesia Plan with the CRNA  Karrie Nissen

## 2018-12-05 NOTE — ASSESSMENT & PLAN NOTE
80 male with no active cardiac issues and known HTN/ HLD and reported CAD but never had cath per pt  Reports good functional capacity of well over >4 METS, manages stairs w/o problem  note thoracic Aortic Aneurysm, non surgical per pt based on previous follow up with Dr Sanjuanita Schumacher  Overall moderate risk, probably non modifiable risk factors  Tolerated anesthesia 2 years ago for inguinal Hernia repair     rpt CT chest  - stable 4 7 aortic aneurysm; EKG -nml  Medically ready for OR, further testing will not

## 2018-12-05 NOTE — PROGRESS NOTES
No urine charted since 1520  Bladder scan patient for 543 ml  Straight cath patient for 350 ml  VSS  Will continue to monitor

## 2018-12-05 NOTE — CONSULTS
History and Physical - [facility] Internal Medicine    Patient Information: Kyaw Jones 80 y o  male MRN: [id number]  Unit/Bed#: -01 Encounter: [id number]  Admitting Physician: Carlton Herring MD  PCP: Bart Voss MD  Date of Admission:  12/04/18    ASSESSMENT:      Hospital Problem List:     Principal Problem:    Closed displaced fracture of right femoral neck ([address] )  Active Problems:    Anemia    Aortic aneurysm ([address] )    Hypertension    Pre-op evaluation      PLAN:    Pre-op evaluation   Assessment & Plan    80 male with no active cardiac issues and known HTN/ HLD and reported CAD but never had cath per pt  Reports good functional capacity of well over >4 METS, manages stairs w/o problem  note thoracic Aortic Aneurysm, non surgical per pt based on previous follow up with Dr Alejandra Arnett  Overall moderate risk, probably non modifiable risk factors  Tolerated anesthesia 2 years ago for inguinal Hernia repair  Will review post rpt CT chest      Hypertension   Assessment & Plan    Stable  Continue medications periop     Aortic aneurysm (HCC)   Assessment & Plan    Last imagine thoracic AA 4 7*4 4 cm  Will need rpt CTchest w/o contrast prior to OR     * Closed displaced fracture of right femoral neck ([address] )   Assessment & Plan    For ORIF per orthopedics             Chief Complaint:   fall    of Present Illness:    Kyaw Jones is a 80 y o  male who presents with a mechanical fall and sustained a Rt femoral neck fracture  Medicine consult for pre op evaluation  Wife at bedside  Review of Systems:    Review of Systems   All other systems reviewed and are negative        Past Medical and Surgical History:     Past Medical History:   Diagnosis Date    Anemia     Aortic aneurysm ([address] )     CAD (coronary artery disease)     Chronic kidney disease     Enlarged prostate     GERD (gastroesophageal reflux disease)     Hard of hearing     Hypercholesteremia     [facility]par[facility]roidism, [facility] ([facility] )     Hypertension     IBS (irritable bowel syndrome)     with constipation    Primary osteoarthritis of one hip, right 12/4/2018    Stroke [facility])     TIA    TIA (transient ischemic attack)        Past Surgical History:   Procedure Laterality Date    APPENDECTOMY      CATARACT EXTRACTION Bilateral     COLONOSCOPY      FL INJECTION RIGHT HIP (NON ARTHROGRAM)  11/19/2018    HERNIA REPAIR      x3    IN REPAIR RECURR Doreen Cushman Left 11/15/2017    Procedure: RECURRENT INGUNIAL HERNIA REPAIR;  Surgeon: Soniya Flynn MD;  Location: [facility];  Service: General    SIGMOIDOSCOPY      TONSILLECTOMY         Meds/Allergies:    PTA meds:   Prior to Admission Medications   Prescriptions Last Dose Informant Patient Reported? Taking?    Cholecalciferol (VITAMIN D3) 2000 units capsule  Spouse/Significant Other Yes No   Sig: Take by mouth daily   Inulin (METAMUCIL CLEAR & NATURAL PO) Not Taking at Unknown time Spouse/Significant Other Yes No   Sig: Take by mouth   Linaclotide (LINZESS) 145 MCG CAPS  Spouse/Significant Other Yes No   Sig: Take 1 tablet by mouth every other day   Multiple Vitamins-Minerals (ONE DAILY MENS 50+/LYCOPENE) TABS  Spouse/Significant Other Yes No   Sig: Take 1 tablet by mouth daily   Multiple Vitamins-Minerals (PRESERVISION AREDS) TABS  Spouse/Significant Other Yes No   Sig: Take 1 tablet by mouth every 12 (twelve) hours   amLODIPine (NORVASC) 5 mg tablet 12/4/2018 at Unknown time Spouse/Significant Other No Yes   Sig: Take 1 tablet (5 mg total) by mouth daily   Patient taking differently: Take 7 5 mg by mouth daily Take 1 5 tablets daily    aspirin (ECOTRIN LOW STRENGTH) 81 mg EC tablet Not Taking at Unknown time Spouse/Significant Other Yes No   Sig: Take 81 mg by mouth 2 (two) times a week   atorvastatin (LIPITOR) 20 mg tablet  Spouse/Significant Other Yes No   Sig: Take 1 tablet by mouth daily   furosemide (LASIX) 40 mg tablet Not Taking at Unknown time Spouse/Significant Other Yes No   omeprazole (PriLOSEC) 40 MG capsule  Spouse/Significant Other Yes No   Sig: Take by mouth   potassium chloride (K-DUR) 10 mEq tablet Not Taking at Unknown time Spouse/Significant Other Yes No   predniSONE 10 mg tablet Not Taking at Unknown time  Yes No   tamsulosin (FLOMAX) 0 4 mg  Spouse/Significant Other Yes No   Sig: Take 1 capsule by mouth daily      Facility-Administered Medications: None       Allergies: Allergies   Allergen Reactions    Other Rash     Adhesive tape causes a rash     History:     Marital Status: /Civil Union   Occupation:   Patient Pre-hospital Living Situation:   Patient Pre-hospital Level of Mobility:   Patient Pre-hospital Diet Restrictions:   Substance Use History:   History   Alcohol Use No     History   Smoking Status    Former Smoker   Smokeless Tobacco    Never Used     Comment: quit 1950's     History   Drug Use No       Family History:    Family History   Problem Relation Age of Onset    No Known Problems Mother     No Known Problems Father        Physical Exam:     Vitals:   Blood Pressure: 146/87 (12/04/18 1353)  Pulse: 68 (12/04/18 1353)  Temperature: 98 3 °F (36 8 °C) (12/04/18 1353)  Temp Source: Oral (12/04/18 1353)  Respirations: 18 (12/04/18 1353)  Height: 5' 4" (162 6 cm) (12/04/18 1353)  Weight - Scale: 53 5 kg (117 lb 15 1 oz) (12/04/18 1353)  SpO2: 97 % (12/04/18 1353)    Physical Exam   HENT:   Head: Normocephalic  Eyes: Pupils are equal, round, and reactive to light  Cardiovascular: Normal heart sounds  No murmur heard  Pulmonary/Chest: Effort normal    Abdominal: Soft  Neurological: He is alert  Skin: Skin is warm  There is pallor  Lab and Imaging Results: I have personally reviewed pertinent reports          Results from last 7 days  Lab Units 12/04/18  1410   WBC Thousand/uL 10 24*   HEMOGLOBIN g/dL 13 7   HEMATOCRIT % 43 1   PLATELETS Thousands/uL 237       Results from last 7 days  Lab Units 12/04/18  1410   POTASSIUM mmol/L 4 9   CHLORIDE mmol/L 103   CO2 mmol/L 32   BUN mg/dL 27*   CREATININE mg/dL 1 36*   CALCIUM mg/dL 9 4       Results from last 7 days  Lab Units 12/04/18  1410   INR  1 04       Xr Chest Pa & Lateral    Result Date: 12/4/2018  Narrative: CHEST INDICATION:   pre-op  COMPARISON:  6/14/2018 EXAM PERFORMED/VIEWS:  XR CHEST PA & LATERAL Images: 3 FINDINGS: Stable cardiomediastinal structures  Stable tortuosity of the thoracic aorta with mild aneurysmal dilatation of the descending thoracic aorta  The heart is not enlarged  No effusions  No congestive failure  No pneumothorax  Mild hyperinflation  Stable bony structures  Impression: No acute cardiopulmonary disease  Stable chest  Workstation performed: [id number]     Xr Hip/pelv 2-3 Vws Right If Performed    Result Date: 12/4/2018  Narrative: RIGHT HIP INDICATION:   M16 11: Unilateral primary osteoarthritis, right hip  COMPARISON:  Plain radiographs November 13, 2018 VIEWS:  XR HIP/PELV 2-3 VWS RIGHT W PELVIS IF PERFORMED Images: 3 FINDINGS: Osseous structures demineralized  Nondisplaced femoral neck fracture  Mild proximal retraction of the distal fracture fragment  Healing fractures of the right superior and inferior pubic rami  Degenerative changes in the bilateral hips with joint space narrowing  Femoral heads are symmetric  No lytic or blastic osseous lesions  Soft tissues are unremarkable  Degenerative changes pubic symphysis and visualized lower lumbar spine  Impression: 1  Acute fracture of the right femoral neck  2   Healing fractures of the right superior and inferior pubic rami  The study was marked in [facility] for immediate notification  Workstation performed: [id number]     Xr Hip/pelv 2-3 Vws Right If Performed    Result Date: 11/15/2018  Narrative: RIGHT HIP INDICATION:   S32 591D: Other specified fracture of right pubis, subsequent encounter for fracture with routine healing  COMPARISON:  Pelvic plain films from 8/2/2018   VIEWS:  XR HIP/PELV 2-3 VWS RIGHT W PELVIS IF PERFORMED FINDINGS: There is no acute fracture or dislocation  There are healed right superior and inferior pubic rami fractures  Moderate right hip osteoarthritis  No lytic or blastic osseous lesions  There are atherosclerotic calcifications  Soft tissues are otherwise unremarkable  The visualized lumbar spine is unremarkable  Impression: No acute osseous abnormality  There are healed right superior and inferior pubic rami fractures  Moderate right hip osteoarthritis  Workstation performed: [id number]     Fl Injection Right Hip (non Arthrogram)    Result Date: 11/20/2018  Narrative: RIGHT HIP INJECTION INDICATION: Chronic right hip pain  M16 11: Unilateral primary osteoarthritis, right hip  COMPARISON:  11/13/2018 XR Right Hip/Pelvis IMAGES:  5 FLUOROSCOPY TIME:   0 1 minutes FINDINGS: After the risks and benefits of the procedure were thoroughly explained, informed consent was obtained  The patient verbalized expressed understanding of the above risks and wished to proceed with the procedure  Final standard "time-out" procedure performed  The patient was prepped and draped in the usual sterile fashion  1% lidocaine solution was utilized for local anesthesia  Intermittent fluoroscopy was utilized for placement a 20 gauge 3 5 inch spinal needle within the right hip joint  After positioning was confirmed with 4 mL of Omnipaque 300, a solution comprised of 1 mL 80 mg/mL Depomedrol, 2 mL of 0 25% Sensorcaine and 2 mL 1% Xylocaine was injected into the right hip joint  The patient tolerated the procedure well  There were no complications  I asked the patient to call us with any questions, concerns, or acute problems  The patient expressed understanding of the above  Impression: Technically successful right hip anesthetic and steroid injection  The above findings and procedure were reviewed with Dr Sallie Crouch  Procedure was performed by Quinn Avila PA-C under the direct supervision of Dr Jose Voss  Workstation performed: [id number]       EKG, Pathology, and Other Studies Reviewed on Admission:   · As above    [employer] Records Reviewed: Yes     ** Please Note: Dragon 360 Dictation voice to text software may have been used in the creation of this document   **

## 2018-12-06 LAB
ANION GAP SERPL CALCULATED.3IONS-SCNC: 10 MMOL/L (ref 4–13)
BUN SERPL-MCNC: 23 MG/DL (ref 5–25)
CALCIUM SERPL-MCNC: 8.5 MG/DL (ref 8.3–10.1)
CHLORIDE SERPL-SCNC: 106 MMOL/L (ref 100–108)
CO2 SERPL-SCNC: 26 MMOL/L (ref 21–32)
CREAT SERPL-MCNC: 1.4 MG/DL (ref 0.6–1.3)
ERYTHROCYTE [DISTWIDTH] IN BLOOD BY AUTOMATED COUNT: 15.1 % (ref 11.6–15.1)
GFR SERPL CREATININE-BSD FRML MDRD: 45 ML/MIN/1.73SQ M
GLUCOSE SERPL-MCNC: 112 MG/DL (ref 65–140)
HCT VFR BLD AUTO: 35.8 % (ref 36.5–49.3)
HGB BLD-MCNC: 11.3 G/DL (ref 12–17)
MCH RBC QN AUTO: 29.1 PG (ref 26.8–34.3)
MCHC RBC AUTO-ENTMCNC: 31.6 G/DL (ref 31.4–37.4)
MCV RBC AUTO: 92 FL (ref 82–98)
PLATELET # BLD AUTO: 181 THOUSANDS/UL (ref 149–390)
PMV BLD AUTO: 8.8 FL (ref 8.9–12.7)
POTASSIUM SERPL-SCNC: 4.3 MMOL/L (ref 3.5–5.3)
RBC # BLD AUTO: 3.88 MILLION/UL (ref 3.88–5.62)
SODIUM SERPL-SCNC: 142 MMOL/L (ref 136–145)
WBC # BLD AUTO: 8.85 THOUSAND/UL (ref 4.31–10.16)

## 2018-12-06 PROCEDURE — 99232 SBSQ HOSP IP/OBS MODERATE 35: CPT | Performed by: INTERNAL MEDICINE

## 2018-12-06 PROCEDURE — G8979 MOBILITY GOAL STATUS: HCPCS

## 2018-12-06 PROCEDURE — 97163 PT EVAL HIGH COMPLEX 45 MIN: CPT

## 2018-12-06 PROCEDURE — 97116 GAIT TRAINING THERAPY: CPT

## 2018-12-06 PROCEDURE — 97167 OT EVAL HIGH COMPLEX 60 MIN: CPT

## 2018-12-06 PROCEDURE — 80048 BASIC METABOLIC PNL TOTAL CA: CPT | Performed by: ORTHOPAEDIC SURGERY

## 2018-12-06 PROCEDURE — 99024 POSTOP FOLLOW-UP VISIT: CPT | Performed by: PHYSICIAN ASSISTANT

## 2018-12-06 PROCEDURE — G8987 SELF CARE CURRENT STATUS: HCPCS

## 2018-12-06 PROCEDURE — G8978 MOBILITY CURRENT STATUS: HCPCS

## 2018-12-06 PROCEDURE — 85027 COMPLETE CBC AUTOMATED: CPT | Performed by: ORTHOPAEDIC SURGERY

## 2018-12-06 PROCEDURE — G8988 SELF CARE GOAL STATUS: HCPCS

## 2018-12-06 PROCEDURE — 97110 THERAPEUTIC EXERCISES: CPT

## 2018-12-06 RX ORDER — OXYCODONE HYDROCHLORIDE 5 MG/1
TABLET ORAL
Qty: 40 TABLET | Refills: 0 | Status: SHIPPED | OUTPATIENT
Start: 2018-12-06 | End: 2019-01-23

## 2018-12-06 RX ORDER — AMLODIPINE BESYLATE 5 MG/1
5 TABLET ORAL DAILY
Status: DISCONTINUED | OUTPATIENT
Start: 2018-12-07 | End: 2018-12-06

## 2018-12-06 RX ADMIN — CEFAZOLIN SODIUM 1000 MG: 1 SOLUTION INTRAVENOUS at 08:25

## 2018-12-06 RX ADMIN — CEFAZOLIN SODIUM 1000 MG: 1 SOLUTION INTRAVENOUS at 00:11

## 2018-12-06 RX ADMIN — ACETAMINOPHEN 650 MG: 325 TABLET, FILM COATED ORAL at 12:38

## 2018-12-06 RX ADMIN — SODIUM CHLORIDE 75 ML/HR: 0.9 INJECTION, SOLUTION INTRAVENOUS at 07:28

## 2018-12-06 RX ADMIN — TAMSULOSIN HYDROCHLORIDE 0.4 MG: 0.4 CAPSULE ORAL at 08:25

## 2018-12-06 RX ADMIN — DOCUSATE SODIUM 100 MG: 100 CAPSULE, LIQUID FILLED ORAL at 08:25

## 2018-12-06 RX ADMIN — DOCUSATE SODIUM 100 MG: 100 CAPSULE, LIQUID FILLED ORAL at 18:03

## 2018-12-06 RX ADMIN — ENOXAPARIN SODIUM 30 MG: 30 INJECTION SUBCUTANEOUS at 08:25

## 2018-12-06 RX ADMIN — ATORVASTATIN CALCIUM 20 MG: 20 TABLET, FILM COATED ORAL at 18:03

## 2018-12-06 RX ADMIN — PANTOPRAZOLE SODIUM 40 MG: 40 TABLET, DELAYED RELEASE ORAL at 06:10

## 2018-12-06 RX ADMIN — AMLODIPINE BESYLATE 5 MG: 5 TABLET ORAL at 08:25

## 2018-12-06 RX ADMIN — OXYCODONE HYDROCHLORIDE 5 MG: 5 TABLET ORAL at 09:11

## 2018-12-06 NOTE — PLAN OF CARE
Problem: PHYSICAL THERAPY ADULT  Goal: Performs mobility at highest level of function for planned discharge setting  See evaluation for individualized goals  Treatment/Interventions: Functional transfer training, LE strengthening/ROM, Therapeutic exercise, Endurance training, Cognitive reorientation, Equipment eval/education, Patient/family training, Bed mobility, Gait training  Equipment Recommended: Other (Comment) (roller walker)       See flowsheet documentation for full assessment, interventions and recommendations  Outcome: Progressing  Prognosis: Good  Problem List: Decreased strength, Decreased range of motion, Decreased endurance, Impaired balance, Decreased mobility, Decreased safety awareness, Orthopedic restrictions, Pain  Assessment: Therapist provided education to pt for mobility technique including transfers and roller walker use  Education was provided due to findings from evaluation  Repetition was required for carryover of education to be noted  Pt was noted to have improvement in functional status after education w/ decreased level of assist necessary to maintain safety and increased ambulation tolerance  Pt continues to be a fall risk  Also introduced LE exercises to address physical and endurance impairments noted during eval  Pt needed occasional input after introduction to maintain technique and complete all reps  Handout was provided to expedite understanding of technique  Pt needed short rest breaks due to fatigue and pain  continued inpatient PT tx is indicated to reduce fall risk factors and progress mobility training as appropriate  Pt stated needing to use the bathroom and experienced increasing lightheadedness after mobilization to bedside  Pt was assisted back to bed and found to have hypotension  NSG was notified  Pt was positioned in bed w/ abduction pillow in place and all needs within reach          Recommendation: Post acute IP rehab          See flowsheet documentation for full assessment

## 2018-12-06 NOTE — OCCUPATIONAL THERAPY NOTE
[address]  Evaluation     Patient Name: Brett Nuñez  Today's Date: 12/6/2018  Problem List  Patient Active Problem List   Diagnosis    Abnormal weight loss    Acute [facility] injury ([facility] [address] )    Anemia    Aortic aneurysm ([address]    Bilateral renal cysts    CKD (chronic kidney disease), stage III ([address]    Coronary artery disease    Hypertension    Transient ischemic attack    Secondary hyperparathyroidism ([facility] [address] )    Closed fracture of multiple pubic rami, right, with routine healing, subsequent encounter    Primary osteoarthritis of one hip, right    Closed displaced fracture of right femoral neck ([address] )    Pre-op evaluation     Past Medical History  Past Medical History:   Diagnosis Date    Anemia     Aortic aneurysm ([address]     CAD (coronary artery disease)     Chronic kidney disease     Enlarged prostate     GERD (gastroesophageal reflux disease)     Hard of hearing     Hypercholesteremia     Hyperparathyroidism, secondary ([address]     Hypertension     IBS (irritable bowel syndrome)     with constipation    Primary osteoarthritis of one hip, right 12/4/2018    Stroke ([facility] [address] )     TIA    TIA (transient ischemic attack)      Past Surgical History  Past Surgical History:   Procedure Laterality Date    APPENDECTOMY      CATARACT EXTRACTION Bilateral     COLONOSCOPY      FL INJECTION RIGHT HIP (NON ARTHROGRAM)  11/19/2018    HERNIA REPAIR      x3    MO REPAIR RECURR INGUIN UNA,REDUCIBL Left 11/15/2017    Procedure: RECURRENT INGUNIAL HERNIA REPAIR;  Surgeon: Alex Banda MD;  [facility];  Service: General    SIGMOIDOSCOPY      TONSILLECTOMY        12/06/18 1015   Note Type   Note type Eval/Treat   Restrictions/Precautions   Weight Bearing Precautions Per Order Yes   RLE Weight Bearing Per Order WBAT   Braces or Orthoses Other (Comment)  (abduction pillow)   Other Precautions Chair Alarm; Bed Alarm;WBS;THR;Fall Risk;Pain   Pain Assessment   Pain Assessment 0-10   Pain Score 6   Pain Type Acute pain;Surgical pain   Pain Location Hip;Leg   Pain Orientation Right   Effect of Pain on Daily Activities limits standing tolerance and I w/ ADL   Patient's Stated Pain Goal No pain   Hospital Pain Intervention(s) Repositioned; Ambulation/increased activity; Emotional support   Response to Interventions tolerated   Home Living   Type of Home House; Other (Comment)  (2 SH)   Home Layout 1/2 bath on main level;Bed/bath upstairs; Two level; Other (Comment)  (0 BUTCH from garge to main level of house)   Bathroom Shower/Tub Walk-in shower   Bathroom Toilet Standard   Bathroom Equipment Grab bars in shower   Bathroom Accessibility Other (Comment)  (1/2 bath main, ful bath up flight of stairs)   Home Equipment Walker;Cane;Stair glide   Additional Comments Pt reports living w/ wife in [facility] w/ stairglide to second floor bedroom, full bath   Prior Function   Level of [place] Needs assistance with IADLs   Lives With Spouse   Receives Help From Family; Other (Comment)  (hx outpt PT)   ADL Assistance Independent   IADLs Needs assistance  (+ drive)   Falls in the last 6 months 1 to 4  (pt reports 3)   Vocational Retired   Comments Pt reports I w/ ADL PTA and has been using RW for functional mobiltiy since fall over summer  At baseline, used cane   Lifestyle   Autonomy Pt reports I w/ ADL and has been using walker for functional mobiltiy recently  Pt drives and reports that his wife takes care of house   Reciprocal Relationships Pt reports supportive wife, family  Pt reports that his son moved a bed down to first floor   Service to Others Pt reports retired [facility]    Intrinsic Gratification Pt reports enjoying [address] [facility] [address]  Modified independent  (supine HOB elevated)   Eating Deficit Setup   Grooming Assistance 5  Supervision/Setup   Grooming Deficit Setup; Increased time to complete   UB Bathing Assistance Unable to assess   LB Bathing Assistance Unable to assess   UB Dressing Assistance 4  Minimal Assistance   UB Dressing Deficit Pull around back; Setup;Verbal cueing; Increased time to complete;Supervision/safety   LB Dressing Assistance 2  Maximal Assistance   LB Dressing Deficit Don/doff R sock; Don/doff L sock; Other (Comment); Setup;Verbal cueing;Supervision/safety; Increased time to complete  (would benefit from use of AE)   Toileting Assistance  3  Moderate Assistance   Toileting Deficit Bedside commode;Setup;Supervison/safety; Clothing management up;Clothing management down  (urinated while seated on commode)   Bed Mobility   Supine to Sit 2  Maximal assistance   Additional items Assist x 2;HOB elevated; Increased time required;Verbal cues;LE management   Sit to Supine 2  Maximal assistance   Additional items Assist x 2; Increased time required;Verbal cues;LE management   Additional Comments Pt seated OOB in recliner chair and requested to use bathroom  Pt retured to bed after urinating seated on commode due to drop in BP  Pt w/ PT RJ supine in bed post eval     Transfers   Sit to Stand 2  Maximal assistance  (progressed to mod A x1 w/ additional trials and cues )   Additional items Assist x 2; Increased time required;Verbal cues   Stand to Sit 3  Moderate assistance   Additional items Assist x 1; Armrests; Increased time required;Verbal cues   Toilet transfer 3  Moderate assistance   Additional items Assist x 1;Commode; Increased time required;Armrests; Verbal cues; Other  (using RW)   Additional Comments Pt required max A x2 for intital sit <> stand from EOB and cues for tech, hand placement and to maintain hip precautions  Pt progressed to mod A x1 w/ additional trials  Completed sit <> stand four times during eval (twice from EOB, once to/ from bedside recliner and once from commode   Functional Mobility   Functional Mobility 3  Moderate assistance   Additional Comments approx 2-3' bed to chair then chair to commode and commode to bed   Cues for sequencing and walker mgmt Additional items Rolling walker   Balance   Static Sitting Fair   Static Standing Poor   Activity Tolerance   Activity Tolerance Patient limited by fatigue;Patient limited by pain;Treatment limited secondary to medical complications (Comment)  (decreased systolic BP w/ sitting)   Medical Staff Made Aware spoke to PT, SENIA and Yoana CODY   Nurse Made Aware spoke to RNEyal   RUE Assessment   RUE Assessment X  (limited AROM shoulder >90*)   RUE Strength   RUE Overall Strength Deficits; Other (Comment)  (limited shoulder, otherwise WFL)   LUE Assessment   LUE Assessment X  (limited AROM shoulder)   LUE Strength   LUE Overall Strength Deficits; Other (Comment)  (shoulder deficits, otherwise WFL to complete ADL)   Sensation   Light Touch No apparent deficits  (B UE)   Sharp/Dull Not tested   Vision-Basic Assessment   Current Vision Wears glasses only for reading   Cognition   Overall Cognitive Status Impaired   Arousal/Participation Alert; Cooperative   Attention Attends with cues to redirect   Orientation Level Oriented to person;Oriented to place;Oriented to situation   Memory Decreased recall of precautions; Other (Comment)  (details, timeframe recent events)   Following Commands Follows one step commands with increased time or repetition   Comments Identified pt by full name, birthdate, and wristband  Pt able to provide social history for home set- up  Able to verbalize understanding of hip precautions, but unable to consistently demonstrate understanding during ADL and functional transfers  Cooperative, pleasant, and oriented to person, place, and situation  Assessment   Limitation Decreased ADL status; Decreased UE ROM; Decreased self-care trans;Decreased high-level ADLs; Decreased cognition;Decreased Safe judgement during ADL   Assessment Pt is an 81yo male [facility] 12/4/2018   Pt presents w/ closed displaced fracture of right femoral neck and signifcant PMH impacting his occupational performance including HTN, CAD, hx pubic ramius fracture, low back pain, AAA  Pt presents s/p R hip hemiarthroplasty on 12/5/2018 and WBAT R LE w/ posterior hip precautions  Pt reports living w/ wife in [facility] w/ 0 BUTCH from garage PTA  Pt reports I w/ ADL, driving, and has been using walker for functional mobiltiy recently  At baseline pt reports using cane  Upon eval, pt required max A x2 supine to sit at EOB  Pt initially required max A x2 sit <> stand, but progressed to mod A x1 w/ additional trials following pt education on tech, hand placement  Pt completed LBD w/ max A and UBD w/ min A to manage gown around back  Pt alert, cooperative, and pleasant throughout eval but benefits from cues to consistently demonstrate understanding of hip precautions during ADL performance  Pt completed functional transfers using RW w/ mod A X 1, increased time, and cues for hand placement, walker mgmt  Pt presents w/ decreased standing tolerance, decresaed standing balance, increased pain, decreased sitting balance, decreased recall / carryover of precautions impacting his I w/ dressing, bathing, functional mobility, functional transfers, activity engagement, community mobility  Pt would benefit from OT while in acute care to address deficits  From an OT perspective, pt would benefit from post acute rehab when medically stable for discharge from acute care  Will continue to follow   Goals   Patient Goals Pt stated that he would like to get back to woodworking   Plan   Treatment Interventions ADL retraining;Functional transfer training;Patient/family training;Equipment evaluation/education; Compensatory technique education;Continued evaluation; Activityengagement   Goal Expiration Date 12/13/18   OT Frequency 3-5x/wk   Recommendation   OT Discharge Recommendation Other (Comment)  (to post acute rehab)   Equipment Recommended Tub seat with back; Bedside commode; Other (comment)  (AE for LB ADL)   OT - OK to Discharge (to post acute rehab when stable)   Barthel Index   Feeding 10   Bathing 0   Grooming Score 5   Dressing Score 5   Bladder Score 5   Bowels Score 10   Toilet Use Score 5   Transfers (Bed/Chair) Score 5   Mobility (Level Surface) Score 0   Stairs Score 0   Barthel Index Score 45   Modified [place] Scale   Modified [place] Scale 4      Pt goals to be met in 7 days:  1  Pt will complete bed mobility supine <> sit w/ min A x1 to max I w/ ADL performance to return home to PLOF w wife  2  Pt will complete functional transfers using least restrictive device w/ S to bed, chair, and commode to max I w/ ADL performance and functional mobility to improve activity engagement to resume woodworking  3  Pt will complete LBD w/ miin A x 1 using AE after set- up while demonstrating good recall hip precautions  4  Pt will engage in functional mobility w/ in room to bathroom using AD w/ S to max I w/ ADL performance  5  Pt will demonstrate increased activity and functional standing tolerance for at least 10 minutes using AD while engaged in grooming/ hygiene standing at sink to max I w/ ADL performance, functional mobility to resume woodworking  6   Pt will demonstrate good attention and consistently demonstrate understanding of hip precautions during ADL performance     Lesly Mccain, OTR/L

## 2018-12-06 NOTE — PROGRESS NOTES
Orthopedics   Elizabeth Bowman 80 y o  male MRN: [id number]  Unit/Bed#: -01      Subjective:  Patient seen and evaluated  Notes mild right hip soreness  No numbness or tingling  No fevers or chills      Labs:    0  Lab Value Date/Time   HCT 35 8 (L) 12/06/2018 0448   HCT 39 3 12/05/2018 0632   HCT 43 1 12/04/2018 1410   HCT 40 7 10/15/2015 1249   HCT 40 3 09/01/2015 0725   HCT 38 1 06/23/2015 1102   HGB 11 3 (L) 12/06/2018 0448   HGB 12 9 12/05/2018 0632   HGB 13 7 12/04/2018 1410   HGB 13 2 10/15/2015 1249   HGB 12 7 09/01/2015 0725   HGB 12 4 06/23/2015 1102   INR 1 04 12/04/2018 1410   INR 1 12 01/09/2015 1455   WBC 8 85 12/06/2018 0448   WBC 9 58 12/05/2018 0632   WBC 10 24 (H) 12/04/2018 1410   WBC 7 13 10/15/2015 1249   WBC 7 13 09/01/2015 0725   WBC 6 51 06/23/2015 1102   ESR 7 06/23/2015 1102       Meds:    Current Facility-Administered Medications:     acetaminophen (TYLENOL) tablet 650 mg, 650 mg, Oral, Q4H PRN, Renea Olvera PA-C    amLODIPine (NORVASC) tablet 5 mg, 5 mg, Oral, Daily, Renea Olvera PA-C, 5 mg at 12/05/18 0808    atorvastatin (LIPITOR) tablet 20 mg, 20 mg, Oral, Daily With Bridgette Rivas PA-C, 20 mg at 12/04/18 1643    calcium carbonate (TUMS) chewable tablet 1,000 mg, 1,000 mg, Oral, Daily PRN, Renea Olvera PA-C    ceFAZolin (ANCEF) IVPB (premix) 1,000 mg, 1,000 mg, Intravenous, Q8H, Regine Dominguez MD, Last Rate: 100 mL/hr at 12/06/18 0011, 1,000 mg at 12/06/18 0011    docusate sodium (COLACE) capsule 100 mg, 100 mg, Oral, BID, Renea Olvera PA-C, 100 mg at 12/05/18 0805    enoxaparin (LOVENOX) subcutaneous injection 30 mg, 30 mg, Subcutaneous, Q24H [address], Jose Benitez MD    Linaclotide CAPS 145 mcg, 145 mcg, Oral, Every Other Day, Renea Olvera PA-C    morphine injection 1 mg, 1 mg, Intravenous, Q4H PRN, Renea Olvera PA-C    ondansetron [facility]) injection 4 mg, 4 mg, Intravenous, Q6H PRN, Renea Olvera PA-C    oxyCODONE (ROXICODONE) IR tablet 2 5 mg, 2 5 mg, Oral, Q4H PRN, Honorio Meng PA-C    oxyCODONE (ROXICODONE) IR tablet 5 mg, 5 mg, Oral, Q4H PRN, Honorio Meng PA-C, 5 mg at 12/05/18 1936    pantoprazole (PROTONIX) EC tablet 40 mg, 40 mg, Oral, Early Morning, Honorio Meng PA-C, 40 mg at 12/06/18 0610    sodium chloride 0 9 % infusion, 75 mL/hr, Intravenous, Continuous, Kaylan Bailey MD, Last Rate: 75 mL/hr at 12/05/18 1944, 75 mL/hr at 12/05/18 1944    tamsulosin (FLOMAX) capsule 0 4 mg, 0 4 mg, Oral, Daily, Honorio Meng PA-C, 0 4 mg at 12/05/18 0805      Physical exam:  Vitals:    12/06/18 0500   BP: 160/85   Pulse: 90   Resp: 18   Temp: 98 1 °F (36 7 °C)   SpO2: 96%      Right hip:   · Dressing C/D/I  · Thigh compartments soft  No excessive soft tissue swelling  · Motor and sensation intact right lower extremity  · Palpable DP pulse     _*_*_*_*_*_*_*_*_*_*_*_*_*_*_*_*_*_*_*_*_*_*_*_*_*_*_*_*_*_*_*_*_*_*_*_*_*_*_*_*_*    Assessment: 80 y  o male POD 1 s/p right hip hemiarthroplasty for femoral neck fracture    Plan:  · Pain Control  · WBAT RLE  · PT/OT  · Posterior Hip Precautions  · DVT proph with Lovenox  · Continue to assess for ABLA  Hgb 11 3 this am compared to 12 9 yesterday    · D/C planning    Honorio Meng PA-C

## 2018-12-06 NOTE — PLAN OF CARE
Problem: PHYSICAL THERAPY ADULT  Goal: Performs mobility at highest level of function for planned discharge setting  See evaluation for individualized goals  Treatment/Interventions: Functional transfer training, LE strengthening/ROM, Therapeutic exercise, Endurance training, Cognitive reorientation, Equipment eval/education, Patient/family training, Bed mobility, Gait training  Equipment Recommended: Other (Comment) (roller walker)       See flowsheet documentation for full assessment, interventions and recommendations  Prognosis: Good  Problem List: Decreased strength, Decreased range of motion, Decreased endurance, Impaired balance, Decreased mobility, Decreased safety awareness, Orthopedic restrictions, Pain  Assessment: Pt presents with right hip pain after a fall yesterday  Pain localizes to the anterior groin and is worse with weight bearing  Dx: right femoral neck fx  12/5/18 right hip hemiarthroplasty  order placed for PT eval and tx, w/ activity order of up w/ assist, WBAT R LE, and posterior hip precautions  pt presents w/ comorbidities of anemia, AAA, CAD, hypercholesterolemia, HTN, TIA, and hip OA and personal factors of advanced age, living in 2 story house and positive fall history  pt presents w/ pain, weakness, decreased ROM, decreased endurance, impaired balance, gait deviations, decreased safety awareness, orthopedic restrictions and fall risk  these impairments are evident in findings from physical examination (weakness and decreased ROM), mobility assessment (need for mod to max assist x 1 to 2 w/ all phases of mobility when usually mobilizing independently, tolerance to only 2 feet of ambulation and need for cueing for mobility technique/THP compliance), and Barthel Index: 40/100  pt needed input for task focus and mobility technique/safety  pt is at risk for falls due to physical and safety awareness deficits   pt's clinical presentation is unstable/unpredictable (evident in poor blood pressure control, need for assist x 1 to 2 w/ all phases of mobility when usually mobilizing independently, tolerance to only 2 feet of ambulation, pain impacting overall mobility status and need for input for mobility technique/safety w/ inconsistent carryover of education received)  pt needs inpatient PT tx to improve mobility deficits  discharge recommendation is for inpatient rehab to reduce fall risk and maximize level of functional independence  Recommendation: Post acute IP rehab          See flowsheet documentation for full assessment

## 2018-12-06 NOTE — PHYSICAL THERAPY NOTE
PHYSICAL THERAPY TREATMENT NOTE    Patient Name: Andry Benavides  QAZRM'D Date: 12/6/2018 12/06/18 1029   Pain Assessment   Pain Assessment 0-10   Pain Score 6   Pain Location Hip   Pain Orientation Right   Restrictions/Precautions   RLE Weight Bearing Per Order WBAT  (and posterior hip precautions)   Braces or Orthoses Other (Comment)  (abduction pillow)   Other Precautions Chair Alarm; Bed Alarm;WBS;THR;Multiple lines; Fall Risk;Pain   General   Chart Reviewed Yes   Additional Pertinent History after completion of ambulation and LE exercises, pt stated needing to use the bathroom  pt was mobilized to bedside commode  pt stated having increasing lightheadedness  blood pressure on commode was 111/59 and 99 BPM  upon return to bed blood pressure was 96/57 and 99 BPM  Isaac Adi NSG was notified  Family/Caregiver Present No   Cognition   Overall Cognitive Status WFL   Arousal/Participation Alert; Cooperative   Attention Attends with cues to redirect   Orientation Level Oriented X4;Other (Comment)  (pt was identified w/ full name )   Following Commands Follows one step commands with increased time or repetition   Subjective   Subjective pt agreed to PT intervention  mobility education was provided regarding transfers and roller walker use   education was completed via demonstration and verbal instruction  Bed Mobility   Sit to Supine 2  Maximal assistance   Additional items Assist x 2; Increased time required;Verbal cues;LE management  (pt was returned to bed due to increasing lightheadedness )   Transfers   Sit to Stand 3  Moderate assistance   Additional items Assist x 1; Increased time required;Verbal cues  (for hand placement, THP compliance)   Stand to Sit 3  Moderate assistance   Additional items Assist x 1; Increased time required;Verbal cues  (for hand placement, THP compliance)   Toilet transfer 3  Moderate assistance Additional items Assist x 1;Commode; Increased time required;Verbal cues  (for hand placement, THP compliance)   Ambulation/Elevation   Gait pattern Forward Flexion;Decreased R stance;Shuffling; Antalgic; Short stride   Gait Assistance 3  Moderate assist   Additional items Assist x 1;Verbal cues; Tactile cues  (for walker positioning, sequncing)   Assistive Device Rolling walker   Distance 5 feet, 3 feet x2  seated rest breaks x 2 to 4 minutes each  (additional not possible due to pain and fatigue)   Stair Management Assistance Not tested   Balance   Static Sitting Fair +   Dynamic Sitting Poor +   Static Standing Poor +   Dynamic Standing Poor   Ambulatory Poor  (w/ roller walker)   Activity Tolerance   Activity Tolerance Patient limited by fatigue;Patient limited by pain   Nurse Made Aware spoke to [facility], Zeus OT, Yohana WILSON, Tammy [facility] Use   Comments seated heel/toe raises 20 each  long arc quads and glut sets 10 each  Assessment   Prognosis Good   Problem List Decreased strength;Decreased range of motion;Decreased endurance; Impaired balance;Decreased mobility; Decreased safety awareness;Orthopedic restrictions;Pain   Assessment Therapist provided education to pt for mobility technique including transfers and roller walker use  Education was provided due to findings from evaluation  Repetition was required for carryover of education to be noted  Pt was noted to have improvement in functional status after education w/ decreased level of assist necessary to maintain safety and increased ambulation tolerance  Pt continues to be a fall risk  Also introduced LE exercises to address physical and endurance impairments noted during eval  Pt needed occasional input after introduction to maintain technique and complete all reps  Handout was provided to expedite understanding of technique  Pt needed short rest breaks due to fatigue and pain   continued inpatient PT tx is indicated to reduce fall risk factors and progress mobility training as appropriate  Pt stated needing to use the bathroom and experienced increasing lightheadedness after mobilization to bedside  Pt was assisted back to bed and found to have hypotension  NSG was notified  Pt was positioned in bed w/ abduction pillow in place and all needs within reach  Goals   Patient Goals get back to [employer]   STG Expiration Date 12/16/18   Short Term Goal #1 pt will: Recall and adhere to posterior hip precautions of R LE at all times to prevent hip dislocation, Increase bilateral LE strength 1/2 grade to facilitate independent mobility, Perform all bed mobility tasks w/ supervision to decrease fall risk factors, Perform all transfers w/ minx1 to improve independence, Ambulate 150 ft  with roller walker w/ minx1 w/o LOB to expedite return to pt's leisure activities like woodworking, Increase all balance 1 grade to decrease risk for falls, Complete exercise program w/ less than 10% input from therapist during session to increase overall activity tolerance, Tolerate 3 hr OOB to faciliate upright tolerance and Improve Barthel Index score to 65 or greater to facilitate independence   Treatment Day 1   Plan   Treatment/Interventions Functional transfer training;LE strengthening/ROM; Therapeutic exercise; Endurance training;Cognitive reorientation;Equipment eval/education;Patient/family training;Bed mobility;Gait training   Progress Progressing toward goals   PT Frequency 5x/wk; Other (Comment)  (and 1x on weekend)   Recommendation   Recommendation Post acute IP rehab   Equipment Recommended Other (Comment)  (roller walker)     Skilled inpatient PT recommended while in hospital to progress pt toward treatment goals      Nilson Amaya, PT

## 2018-12-06 NOTE — PHYSICAL THERAPY NOTE
PHYSICAL THERAPY EVALUATION NOTE    Patient Name: Kane MCGOVERN Date: 12/6/2018  AGE:   80 y o  Mrn:   [id number]  ADMIT DX:  Closed displaced fracture of right femoral neck (HCC)    Past Medical History:   Diagnosis Date    Anemia     [facility]ortic aneurysm ([facility] )     CAD (coronary artery disease)     Chronic kidney disease     Enlarged prostate     GERD (gastroesophageal reflux disease)     Hard of hearing     Hypercholesteremia     [facility]     Hypertension     IBS (irritable bowel syndrome)     with constipation    Primary osteoarthritis of one hip, right 12/4/2018    Stroke [facility])     TIA    TIA (transient ischemic attack)      Length Of Stay: 2  PHYSICAL THERAPY EVALUATION :    12/06/18 1000   Pain Assessment   Pain Assessment 0-10   Pain Score 6   Pain Location Hip   Pain Orientation Right   Home Living   Type of [address] [place] [address] Two level;1/2 bath on main level;Bed/bath upstairs; Other (Comment)  (no BUTCH  stairglide to 2nd floor )   Additional Comments lives w/ spouse  ambulates w/ cane, though lately has needed walker  independent w/ ADLs and driving  3 falls in last 6 months  Prior Function   Comments pt seen supine in bed  agreed to partcipate in PT eval  c/o right hip pain  also had mild lightheadedness w/ sitting on edge of bed and standing  Restrictions/Precautions   RLE Weight Bearing Per Order WBAT  (and posterior hip precautions)   Braces or Orthoses Other (Comment)  (abduction pillow)   Other Precautions Chair Alarm; Bed Alarm;WBS;THR;Multiple lines; Fall Risk;Pain   General   Additional Pertinent History 12/5/18 at 1:00, blood pressure was 180/98     Family/Caregiver Present No   Cognition   Overall Cognitive Status WFL   Arousal/Participation Alert   Orientation Level Oriented X4;Other (Comment)  (pt was identified w/ full name )   Following Commands Follows one step commands with increased time or repetition   Comments room air resting pulse ox 93% and 98 BPM  seated edge of bed pt reported feeling lightheaded  blood pressure was 166/94 and 95 BPM    RUE Assessment   RUE Assessment WFL   LUE Assessment   LUE Assessment WFL   RLE Assessment   RLE Assessment X  (hip 2+/5, knee flexion 3-/5 extension 3/5, ankle 3-/5)   LLE Assessment   LLE Assessment WFL  (3+/5)   Light Touch   RLE Light Touch Grossly intact   LLE Light Touch Grossly intact   Bed Mobility   Supine to Sit 2  Maximal assistance   Additional items Assist x 2;HOB elevated; Increased time required;Verbal cues;LE management   Transfers   Sit to Stand 2  Maximal assistance   Additional items Assist x 2; Increased time required;Verbal cues  (for hand placement, LE positioning, THP compliance)   Stand to Sit 3  Moderate assistance   Additional items Assist x 1; Increased time required;Verbal cues  (for body positioning, hand placement, THP compliance)   Ambulation/Elevation   Gait pattern Forward Flexion;Decreased R stance;Shuffling; Antalgic; Short stride; Excessively slow   Gait Assistance 2  Maximal assist   Additional items Assist x 1;Verbal cues; Tactile cues  (for walker placement, sequencing, hip precaution compliance)   Assistive Device Rolling walker   Distance 2 feet  (additional not possible due to pain and fatigue)   Stair Management Assistance Not tested  (due to limited ambulation tolerance)   Balance   Static Sitting Fair +   Dynamic Sitting Poor +   Static Standing Poor   Dynamic Standing Poor   Ambulatory Poor -  (w/ roller walker)   Activity Tolerance   Activity Tolerance Patient limited by fatigue;Patient limited by pain   Nurse Made Aware [facility], Bree PCA, Jessica CM   Assessment   Prognosis Good   Problem List Decreased strength;Decreased range of motion;Decreased endurance; Impaired balance;Decreased mobility; Decreased safety awareness;Orthopedic restrictions;Pain   Assessment Pt presents with right hip pain after a fall yesterday  Pain localizes to the anterior groin and is worse with weight bearing  Dx: right femoral neck fx  12/5/18 right hip hemiarthroplasty  order placed for PT eval and tx, w/ activity order of up w/ assist, WBAT R LE, and posterior hip precautions  pt presents w/ comorbidities of anemia, AAA, CAD, hypercholesterolemia, HTN, TIA, and hip OA and personal factors of advanced age, living in 2 story house and positive fall history  pt presents w/ pain, weakness, decreased ROM, decreased endurance, impaired balance, gait deviations, decreased safety awareness, orthopedic restrictions and fall risk  these impairments are evident in findings from physical examination (weakness and decreased ROM), mobility assessment (need for mod to max assist x 1 to 2 w/ all phases of mobility when usually mobilizing independently, tolerance to only 2 feet of ambulation and need for cueing for mobility technique/THP compliance), and Barthel Index: 40/100  pt needed input for task focus and mobility technique/safety  pt is at risk for falls due to physical and safety awareness deficits  pt's clinical presentation is unstable/unpredictable (evident in poor blood pressure control, need for assist x 1 to 2 w/ all phases of mobility when usually mobilizing independently, tolerance to only 2 feet of ambulation, pain impacting overall mobility status and need for input for mobility technique/safety w/ inconsistent carryover of education received)  pt needs inpatient PT tx to improve mobility deficits  discharge recommendation is for inpatient rehab to reduce fall risk and maximize level of functional independence      Goals   Patient Goals get back to [place]   [facility] Expiration Date 12/16/18   Short Term Goal #1 pt will: Recall and adhere to posterior hip precautions of R LE at all times to prevent hip dislocation, Increase bilateral LE strength 1/2 grade to facilitate independent mobility, Perform all bed mobility tasks w/ supervision to decrease fall risk factors, Perform all transfers w/ minx1 to improve independence, Ambulate 150 ft  with roller walker w/ minx1 w/o LOB to expedite return to pt's leisure activities like woodworking, Increase all balance 1 grade to decrease risk for falls, Complete exercise program w/ less than 10% input from therapist during session to increase overall activity tolerance, Tolerate 3 hr OOB to faciliate upright tolerance and Improve Barthel Index score to 65 or greater to facilitate independence   Plan   Treatment/Interventions Functional transfer training;LE strengthening/ROM; Therapeutic exercise; Endurance training;Cognitive reorientation;Equipment eval/education;Patient/family training;Bed mobility;Gait training   PT Frequency 5x/wk; Other (Comment)  (and 1x on weekend)   Recommendation   Recommendation Post acute IP rehab   Equipment Recommended Other (Comment)  (roller walker)   Barthel Index   Feeding 10   Bathing 0   Grooming Score 0   Dressing Score 5   Bladder Score 5   Bowels Score 10   Toilet Use Score 5   Transfers (Bed/Chair) Score 5   Mobility (Level Surface) Score 0   Stairs Score 0   Barthel Index Score 40     Skilled PT recommended while in hospital and upon DC to progress pt toward treatment goals       Omar Felder, PT

## 2018-12-06 NOTE — PLAN OF CARE
Problem: DISCHARGE PLANNING - CARE MANAGEMENT  Goal: Discharge to post-acute care or home with appropriate resources  INTERVENTIONS:  - Conduct assessment to determine patient/family and health care team treatment goals, and need for post-acute services based on payer coverage, community resources, and patient preferences, and barriers to discharge  - Address psychosocial, clinical, and financial barriers to discharge as identified in assessment in conjunction with the patient/family and health care team  - Arrange appropriate level of post-acute services according to patients   needs and preference and payer coverage in collaboration with the physician and health care team  - Communicate with and update the patient/family, physician, and health care team regarding progress on the discharge plan  - Arrange appropriate transportation to post-acute venues  Outcome: Progressing  LOS 2 days  Pt is not a bundle or a readmission  S/w patient at bedside along with wife, son, and daughter present  Pt lives with his wife in a 2 story home in [address] [place] [address] with 1 BUTCH  Pt uses no DME and has no hx of IP rehab or home care  He uses [address] in [place] and has no difficulty affording meds  Pt's wife is his POA and a copy of this document was requested  Pt is retired and drives himself  DCP recommendation of STR  Pt reluctant but agreeable  Family on board and insisting on rehab as wife is unable to care patient in his current condition  Pt's family provided 2 copies of SNF lists obtained from [facility] including all facilities that accept patient's insurance within 25 miles  Awaiting choices from patients family  Explained time frame and insurance auth process       CM reviewed discharge planning process including the following: identifying caregivers at home, preference for d/c planning needs, [facility] Meds to Bed program, availability of treatment team to discuss questions or concerns patient and/or family may have regarding diagnosis, plan of care, old or new medications and discharge planning   CM will continue to follow for care coordination and update assessment as necessary

## 2018-12-06 NOTE — PLAN OF CARE
Problem: OCCUPATIONAL THERAPY ADULT  Goal: Performs self-care activities at highest level of function for planned discharge setting  See evaluation for individualized goals  Treatment Interventions: ADL retraining, Functional transfer training, Patient/family training, Equipment evaluation/education, Compensatory technique education, Continued evaluation, Activityengagement  Equipment Recommended: Tub seat with back, Bedside commode, Other (comment) (AE for LB ADL)       See flowsheet documentation for full assessment, interventions and recommendations  Limitation: Decreased ADL status, Decreased UE ROM, Decreased self-care trans, Decreased high-level ADLs, Decreased cognition, Decreased Safe judgement during ADL     Assessment: Pt is an 81yo male [facility] 12/4/2018  Pt presents w/ closed displaced fracture of right femoral neck and signifcant PMH impacting his occupational performance including HTN, CAD, hx pubic ramius fracture, low back pain, AAA  Pt presents s/p R hip hemiarthroplasty on 12/5/2018 and WBAT R LE w/ posterior hip precautions  Pt reports living w/ wife in [facility] w/ 0 BUTCH from garage PTA  Pt reports I w/ ADL, driving, and has been using walker for functional mobiltiy recently  At baseline pt reports using cane  Upon eval, pt required max A x2 supine to sit at EOB  Pt initially required max A x2 sit <> stand, but progressed to mod A x1 w/ additional trials following pt education on tech, hand placement  Pt completed LBD w/ max A and UBD w/ min A to manage gown around back  Pt alert, cooperative, and pleasant throughout eval but benefits from cues to consistently demonstrate understanding of hip precautions during ADL performance  Pt completed functional transfers using RW w/ mod A X 1, increased time, and cues for hand placement, walker mgmt   Pt presents w/ decreased standing tolerance, decresaed standing balance, increased pain, decreased sitting balance, decreased recall / carryover of precautions impacting his I w/ dressing, bathing, functional mobility, functional transfers, activity engagement, community mobility  Pt would benefit from OT while in acute care to address deficits  From an OT perspective, pt would benefit from post acute rehab when medically stable for discharge from acute care   Will continue to follow     OT Discharge Recommendation: Other (Comment) (to post acute rehab)  OT - OK to Discharge:  (to post acute rehab when stable)

## 2018-12-06 NOTE — DISCHARGE INSTRUCTIONS
Care after your surgery:  · Ice the surgical site 3-4 times a day for 15 minutes  · Keep the bandages dry at all times  Remove 3 days after surgery and resume showering if the incision is dry  · Apply a clean bandage daily after showering until the staples are removed  · Ambulate with an assistive device until cleared by the physical therapist   · Weight bearing as tolerated on the operative leg  · Maintain universal hip precautions for 3 months from the surgery date  · Continue Lovenox for 4 weeks from the surgery date to prevent blood clots  · Do not drive until cleared by the surgeon  Call the office at [phone] if you have any of the following:  · Excessive redness or drainage at the surgical site  · Fever above 101 5° F   · Pain unrelieved by medication  · Any numbness, tingling, or excessive swelling of the operative extremity  Follow-Up Appointment:  · 10-14 days after surgery with Dr Ishmael Harris

## 2018-12-06 NOTE — PROGRESS NOTES
Consult Progress Note - Kanchan Piper 1933, 80 y o  male MRN: [id number]    Unit/Bed#: -Marii Encounter: [id number]    Primary Care Provider: Walton Holstein, MD   Date and time admitted to hospital: 2018  1:13 PM    Hypertension   Assessment & Plan    Note mild orthostasis with dizziness  IVF and rpt vitals next 24 hrs  Hold amlodipine     Aortic aneurysm [facility])   Assessment & Plan    Last imagine thoracic AA 4 7*4 4 cm  Stable CT chest     Anemia   Assessment & Plan    No significant drop in Hb  Rpt in AM     * Closed displaced fracture of right femoral neck (HCC)   Assessment & Plan    For ORIF per orthopedics       Subjective: dizzy on trying to walk today with PT    Objective:     Vitals:   Temp (24hrs), Av 8 °F (36 6 °C), Min:97 1 °F (36 2 °C), Max:98 6 °F (37 °C)    Temp:  [97 1 °F (36 2 °C)-98 6 °F (37 °C)] 97 6 °F (36 4 °C)  HR:  [60-93] 86  Resp:  [16-20] 18  BP: (101-180)/(58-98) 108/58  SpO2:  [9 %-99 %] 9 %  Body mass index is 20 08 kg/m²  Input and Output Summary (last 24 hours): Intake/Output Summary (Last 24 hours) at 18 1520  Last data filed at 18 1352   Gross per 24 hour   Intake             2040 ml   Output              900 ml   Net             1140 ml       Physical Exam   HENT:   Head: Normocephalic  Eyes: Pupils are equal, round, and reactive to light  Cardiovascular: Normal heart sounds  Pulmonary/Chest: Effort normal    Abdominal: Soft  Neurological: He is alert           Additional Data:     Labs:      Results from last 7 days  Lab Units 18  0448   WBC Thousand/uL 8 85   HEMOGLOBIN g/dL 11 3*   HEMATOCRIT % 35 8*   PLATELETS Thousands/uL 181       Results from last 7 days  Lab Units 18  0448   POTASSIUM mmol/L 4 3   CHLORIDE mmol/L 106   CO2 mmol/L 26   BUN mg/dL 23   CREATININE mg/dL 1 40*   CALCIUM mg/dL 8 5       Results from last 7 days  Lab Units 18  1410   INR  1 04       * I Have Reviewed All Lab Data Listed Above     Imaging:  Imaging Personally Reviewed by Myself Includes:     Cultures:   Blood Culture: No results found for: BLOODCX  Urine Culture:   Lab Results   Component Value Date    URINECX No Growth <1000 cfu/mL 07/15/2016     Sputum Culture: No components found for: SPUTUMCX  Wound Culture: No results found for: WOUNDCULT    Last 24 Hours Medication List:     Current Facility-Administered Medications:  acetaminophen 650 mg Oral Q4H PRN Valerie Anai, PA-C    atorvastatin 20 mg Oral Daily With [employer], PA-C    calcium carbonate 1,000 mg Oral Daily PRN Valerie Anai, PA-C    docusate sodium 100 mg Oral BID Valerie Anai, PA-C    enoxaparin 30 mg Subcutaneous Q24H [facility] Jacinda Eldridge MD    Linaclotide 145 mcg Oral Every Other Day Valerieadolfo Ospina, PA-C    morphine injection 1 mg Intravenous Q4H PRN Valerie Anai, PA-C    ondansetron 4 mg Intravenous Q6H PRN Valerie Anai, PA-C    oxyCODONE 2 5 mg Oral Q4H PRN Valerie Anai, PA-C    oxyCODONE 5 mg Oral Q4H PRN Valerie Anai, PA-C    pantoprazole 40 mg Oral Early Morning Valerieadolfo Ospina, PA-C    sodium chloride 75 mL/hr Intravenous Continuous Brandyn Marcum MD Last Rate: 75 mL/hr (12/06/18 0728)   tamsulosin 0 4 mg Oral Daily Valerie Ospina, PA-C         Today, Patient Was Seen By: Jacinda Eldridge MD    ** Please Note: Dragon 360 Dictation voice to text software may have been used in the creation of this document   **

## 2018-12-07 VITALS
HEIGHT: 64 IN | SYSTOLIC BLOOD PRESSURE: 122 MMHG | BODY MASS INDEX: 19.97 KG/M2 | TEMPERATURE: 98.1 F | HEART RATE: 67 BPM | DIASTOLIC BLOOD PRESSURE: 79 MMHG | RESPIRATION RATE: 20 BRPM | WEIGHT: 117 LBS | OXYGEN SATURATION: 96 %

## 2018-12-07 LAB
ATRIAL RATE: 56 BPM
P AXIS: 38 DEGREES
PR INTERVAL: 148 MS
QRS AXIS: -4 DEGREES
QRSD INTERVAL: 86 MS
QT INTERVAL: 408 MS
QTC INTERVAL: 393 MS
T WAVE AXIS: 59 DEGREES
VENTRICULAR RATE: 56 BPM

## 2018-12-07 PROCEDURE — 97530 THERAPEUTIC ACTIVITIES: CPT

## 2018-12-07 PROCEDURE — 93010 ELECTROCARDIOGRAM REPORT: CPT | Performed by: INTERNAL MEDICINE

## 2018-12-07 PROCEDURE — 97116 GAIT TRAINING THERAPY: CPT

## 2018-12-07 PROCEDURE — 97535 SELF CARE MNGMENT TRAINING: CPT

## 2018-12-07 PROCEDURE — 97110 THERAPEUTIC EXERCISES: CPT

## 2018-12-07 PROCEDURE — 99232 SBSQ HOSP IP/OBS MODERATE 35: CPT | Performed by: INTERNAL MEDICINE

## 2018-12-07 PROCEDURE — 99024 POSTOP FOLLOW-UP VISIT: CPT | Performed by: PHYSICIAN ASSISTANT

## 2018-12-07 RX ORDER — AMLODIPINE BESYLATE 5 MG/1
5 TABLET ORAL DAILY
Status: DISCONTINUED | OUTPATIENT
Start: 2018-12-07 | End: 2018-12-07 | Stop reason: HOSPADM

## 2018-12-07 RX ADMIN — PANTOPRAZOLE SODIUM 40 MG: 40 TABLET, DELAYED RELEASE ORAL at 06:47

## 2018-12-07 RX ADMIN — OXYCODONE HYDROCHLORIDE 2.5 MG: 5 TABLET ORAL at 13:55

## 2018-12-07 RX ADMIN — DOCUSATE SODIUM 100 MG: 100 CAPSULE, LIQUID FILLED ORAL at 17:21

## 2018-12-07 RX ADMIN — ENOXAPARIN SODIUM 30 MG: 30 INJECTION SUBCUTANEOUS at 08:13

## 2018-12-07 RX ADMIN — ATORVASTATIN CALCIUM 20 MG: 20 TABLET, FILM COATED ORAL at 17:21

## 2018-12-07 RX ADMIN — AMLODIPINE BESYLATE 5 MG: 5 TABLET ORAL at 12:07

## 2018-12-07 RX ADMIN — TAMSULOSIN HYDROCHLORIDE 0.4 MG: 0.4 CAPSULE ORAL at 08:13

## 2018-12-07 RX ADMIN — DOCUSATE SODIUM 100 MG: 100 CAPSULE, LIQUID FILLED ORAL at 08:13

## 2018-12-07 NOTE — PROGRESS NOTES
Placed call to ICU for assistance with new IV access  Pt states catheter placed by MS4 Charge RN is "stabbing" him when he bends his arm and insists it be removed

## 2018-12-07 NOTE — PLAN OF CARE
DISCHARGE PLANNING - CARE MANAGEMENT     Discharge to post-acute care or home with appropriate resources Progressing        MUSCULOSKELETAL - ADULT     Maintain or return mobility to safest level of function Progressing     Maintain proper alignment of affected body part Progressing        PAIN - ADULT     Verbalizes/displays adequate comfort level or baseline comfort level Progressing        Potential for Falls     Patient will remain free of falls Progressing        Prexisting or High Potential for Compromised Skin Integrity     Skin integrity is maintained or improved Progressing

## 2018-12-07 NOTE — PROGRESS NOTES
Orthopedics   Rock Mayes 80 y o  male MRN: [id number]  Unit/Bed#: -01      Subjective:  80 y  o male post operative day 2 right hip hemiarthroplasty  Pt doing well, reports pain is well controlled  Denies any numbness or tingling  Denies any fevers or chills      Labs:    0  Lab Value Date/Time   HCT 35 8 (L) 12/06/2018 0448   HCT 39 3 12/05/2018 0632   HCT 43 1 12/04/2018 1410   HCT 40 7 10/15/2015 1249   HCT 40 3 09/01/2015 0725   HCT 38 1 06/23/2015 1102   HGB 11 3 (L) 12/06/2018 0448   HGB 12 9 12/05/2018 0632   HGB 13 7 12/04/2018 1410   HGB 13 2 10/15/2015 1249   HGB 12 7 09/01/2015 0725   HGB 12 4 06/23/2015 1102   INR 1 04 12/04/2018 1410   INR 1 12 01/09/2015 1455   WBC 8 85 12/06/2018 0448   WBC 9 58 12/05/2018 0632   WBC 10 24 (H) 12/04/2018 1410   WBC 7 13 10/15/2015 1249   WBC 7 13 09/01/2015 0725   WBC 6 51 06/23/2015 1102   ESR 7 06/23/2015 1102       Meds:    Current Facility-Administered Medications:     acetaminophen (TYLENOL) tablet 650 mg, 650 mg, Oral, Q4H PRN, Aleksandr Alicia, PA-C, 650 mg at 12/06/18 1238    atorvastatin (LIPITOR) tablet 20 mg, 20 mg, Oral, Daily With Erik Robertson PA-C, 20 mg at 12/06/18 1803    calcium carbonate (TUMS) chewable tablet 1,000 mg, 1,000 mg, Oral, Daily PRN, Aleksandr Alicia PA-C    docusate sodium (COLACE) capsule 100 mg, 100 mg, Oral, BID, Aleksandr Alicia, PA-C, 100 mg at 12/06/18 1803    enoxaparin (LOVENOX) subcutaneous injection 30 mg, 30 mg, Subcutaneous, Q24H [address], Nayeli Olivas MD, 30 mg at 12/06/18 0825    Linaclotide CAPS 145 mcg, 145 mcg, Oral, Every Other Day, Aleksandr Vargas PA-C    morphine injection 1 mg, 1 mg, Intravenous, Q4H PRN, Aleksandr Vargas PA-C    ondansetron [facility]) injection 4 mg, 4 mg, Intravenous, Q6H PRN, Aleksandr Vargas PA-C    oxyCODONE (ROXICODONE) IR tablet 2 5 mg, 2 5 mg, Oral, Q4H PRN, Aleksandr Vargas PA-C    oxyCODONE (ROXICODONE) IR tablet 5 mg, 5 mg, Oral, Q4H PRN, Aleksandr Vargas PA-C, 5 mg at 12/06/18 0911   pantoprazole (PROTONIX) EC tablet 40 mg, 40 mg, Oral, Early Morning, Colleen Romero PA-C, 40 mg at 12/07/18 0647    tamsulosin (FLOMAX) capsule 0 4 mg, 0 4 mg, Oral, Daily, Colleen Romero PA-C, 0 4 mg at 12/06/18 0825    Blood Culture:   No results found for: BLOODCX    Wound Culture:   No results found for: WOUNDCULT    Ins and Outs:  I/O last 24 hours: In: 1240 [P O :360; I V :880]  Out: 950 [Urine:950]          Physical:  Vitals:    12/07/18 0700   BP: 161/86   Pulse: 86   Resp: 18   Temp: 98 3 °F (36 8 °C)   SpO2: 93%     right lower extremity  · Dressings clean dry intact - scant amount of serosanguineous drainage middle portion Mepilex dressing  · Sensation intact to light touch L1-S1  · Motor intact with knee flexion/extension, EHL/FHL  · DP pulse intact  · Compartments soft and compressible    _*_*_*_*_*_*_*_*_*_*_*_*_*_*_*_*_*_*_*_*_*_*_*_*_*_*_*_*_*_*_*_*_*_*_*_*_*_*_*_*_*    Assessment: 80 y  o male post operative day 2 right hip hemiarthroplasty for femoral neck fracture    Plan:  · Weightbearing as tolerated right lower extremity  · Abduction pillow while in bed  · Posterior precautions  · PT/OT  · DVT prophylaxis - SCDs, Lovenox  · Analgesics   · Dispo:   Stable for discharge from orthopedic standpoint, awaiting placement in facility  · Patient noted to have acute blood loss anemia due to a drop in Hbg of > 2 0g from preop levels, will monitor vital signs and resuscitate with IV fluids as needed, at the time of this note no labs available, patient did drop from 12 9-11 3 from 12/05 to 12/6, will continue to monitor clinically as well    Lisset Pantoja PA-C

## 2018-12-07 NOTE — PLAN OF CARE
Problem: PHYSICAL THERAPY ADULT  Goal: Performs mobility at highest level of function for planned discharge setting  See evaluation for individualized goals  Treatment/Interventions: Functional transfer training, LE strengthening/ROM, Therapeutic exercise, Endurance training, Cognitive reorientation, Equipment eval/education, Patient/family training, Bed mobility, Gait training  Equipment Recommended: Other (Comment) (roller walker)       See flowsheet documentation for full assessment, interventions and recommendations  Outcome: Progressing  Prognosis: Good  Problem List: Decreased strength, Decreased range of motion, Decreased endurance, Impaired balance, Decreased mobility, Decreased safety awareness, Orthopedic restrictions, Pain  Assessment: Patient motivated and willing to participate in therapy session  Patient demonstrated ability to transfer sit to stand with consistent min a x1 and verbal instructio for R LE positioning and hand placement  Patient able to ambulate increased gait distance with use of roller walker and mod a x1  Requires assistance for steadying and walker management  Requires verbal instruction for stepping sequence and LE positioning as patient with tendency to turn toes inward with ambulation  Patient able to verbalized 2/3 hip precautions requiring cuing for remaining 1/3  Requires verbal instruction for compliance with hip precautiosn throughout all mobility with fair carry over  Patient participated in seated B LE exericse program with good understanding and form  Continue to focus on OOB mobility tasks with progression of transfers and gait as appropriate  Recommendation: Post acute IP rehab          See flowsheet documentation for full assessment

## 2018-12-07 NOTE — PHYSICAL THERAPY NOTE
PHYSICAL THERAPY NOTE    Patient Name: Candance Gone  JDORW'L Date: 18 1047   Pain Assessment   Pain Assessment 0-10   Pain Score 5   Pain Type Acute pain   Pain Location Hip   Pain Orientation Right   Restrictions/Precautions   Weight Bearing Precautions Per Order Yes   RLE Weight Bearing Per Order WBAT   Braces or Orthoses Other (Comment)  (abduction pillow)   Other Precautions Chair Alarm; Bed Alarm;WBS;THR;Multiple lines; Fall Risk;Pain   General   Family/Caregiver Present Yes   Subjective   Subjective Patient seated OOB in recliner and is agreeable to therapy session  Patient identifers obtained from name &   Bed Mobility   Supine to Sit Unable to assess   Sit to Supine Unable to assess   Additional Comments Patient seated OOB in recliner pre and post session with chair alarm engaged, call bell and belongings in reach  Transfers   Sit to Stand 4  Minimal assistance   Additional items Assist x 1; Armrests; Increased time required;Verbal cues   Stand to Sit 4  Minimal assistance   Additional items Assist x 1; Armrests; Increased time required;Verbal cues   Ambulation/Elevation   Gait pattern Forward Flexion;Decreased R stance;Shuffling; Antalgic; Short stride; Excessively slow   Gait Assistance 3  Moderate assist   Additional items Assist x 1;Verbal cues   Assistive Device Rolling walker   Distance 10' x1, 55' x1   Balance   Static Sitting Fair +   Dynamic Sitting Poor +   Static Standing Poor +   Dynamic Standing Poor   Ambulatory Poor   Endurance Deficit   Endurance Deficit Yes   Activity Tolerance   Activity Tolerance Patient limited by fatigue;Patient limited by pain   Medical Staff Made Aware Spoke to Natalie Lamar OT   Nurse Made Aware Spoke to SABINE Knox    Exercises   Quad Sets Sitting;10 reps;AROM; Bilateral   Glute Sets Sitting;10 reps;AROM; Bilateral   Knee AROM Long Arc Quad Sitting;10 reps;AROM; Bilateral   Ankle Pumps Sitting;10 reps;AROM; Bilateral   Assessment   Prognosis Good   Problem List Decreased strength;Decreased range of motion;Decreased endurance; Impaired balance;Decreased mobility; Decreased safety awareness;Orthopedic restrictions;Pain   Assessment Patient motivated and willing to participate in therapy session  Patient demonstrated ability to transfer sit to stand with consistent min a x1 and verbal instructio for R LE positioning and hand placement  Patient able to ambulate increased gait distance with use of roller walker and mod a x1  Requires assistance for steadying and walker management  Requires verbal instruction for stepping sequence and LE positioning as patient with tendency to turn toes inward with ambulation  Patient able to verbalized 2/3 hip precautions requiring cuing for remaining 1/3  Requires verbal instruction for compliance with hip precautiosn throughout all mobility with fair carry over  Patient participated in seated B LE exericse program with good understanding and form  Continue to focus on OOB mobility tasks with progression of transfers and gait as appropriate  Goals   Patient Goals to get home after rehab   STG Expiration Date 12/16/18   Treatment Day 2   Plan   Treatment/Interventions Functional transfer training;LE strengthening/ROM; Therapeutic exercise; Endurance training;Cognitive reorientation;Equipment eval/education;Patient/family training;Bed mobility;Gait training   Progress Progressing toward goals   PT Frequency 5x/wk; Other (Comment)  (and 1x weekend)   Recommendation   Recommendation Post acute IP rehab   Equipment Recommended Other (Comment)  (roller walker)       Jonathon Pierson PTA

## 2018-12-07 NOTE — OCCUPATIONAL THERAPY NOTE
[address]  Evaluation     Patient Name: Galilea Higgins  Today's Date: 12/7/2018  Problem List  Patient Active Problem List   Diagnosis    Abnormal weight loss    [facility]    Anemia    Aortic aneurysm ([facility] )    Bilateral renal cysts    CKD (chronic kidney disease), stage III ([address] )    Coronary artery disease    Hypertension    Transient ischemic attack    Secondary hyperparathyroidism ([facility] [facility] )    Closed fracture of multiple pubic rami, right, with routine healing, subsequent encounter    Primary osteoarthritis of one hip, right    Closed displaced fracture of right femoral neck ([address] )    Pre-op evaluation     Past Medical History  Past Medical History:   Diagnosis Date    Anemia     Aortic aneurysm ([facility] [address] [facility]     CAD (coronary artery disease)     Chronic kidney disease     Enlarged prostate     GERD (gastroesophageal reflux disease)     Hard of hearing     Hypercholesteremia     Hyperparathyroidism, secondary ([address]     Hypertension     IBS (irritable bowel syndrome)     with constipation    Primary osteoarthritis of one hip, right 12/4/2018    Stroke [facility])     TIA    TIA (transient ischemic attack)      Past Surgical History  Past Surgical History:   Procedure Laterality Date    APPENDECTOMY      CATARACT EXTRACTION Bilateral     COLONOSCOPY      FL INJECTION RIGHT HIP (NON ARTHROGRAM)  11/19/2018    HERNIA REPAIR      x3    FL PARTIAL HIP REPLACEMENT Right 12/5/2018    Procedure: RIGHT HIP HEMIARTHROPLASTY;  Surgeon: Ania Toscano MD;  [facility];  Service: Orthopedics    FL REPAIR RECURR Mima Mendez Left 11/15/2017    Procedure: RECURRENT [address];  Surgeon: Conrado Lockhart MD;  [facility];  Service: General    SIGMOIDOSCOPY      TONSILLECTOMY        12/07/18 1542   Restrictions/Precautions   Weight Bearing Precautions Per Order Yes   RLE Weight Bearing Per Order WBAT   Braces or Orthoses Other (Comment)  (abduction pillow)   Other Precautions Chair Alarm; Bed Alarm;WBS;THR;Fall Risk;Pain   General   Response to Previous Treatment Patient with no complaints from previous session   Family/Caregiver Present Pt's wife present during session, and additional family memeber arrived during session   Pain Assessment   Pain Assessment 0-10   Pain Score 5   Pain Type Acute pain;Surgical pain   Pain Location Hip;Leg   Pain Orientation Right   Effect of Pain on Daily Activities limits activity tolerance and standing tolerance during ADL performance   Patient's Stated Pain Goal No pain   Hospital Pain Intervention(s) Repositioned; Ambulation/increased activity; Emotional support   Response to Interventions tolerated   ADL   Where Assessed (chair, commode in bathroom, bed)   LB Dressing Assistance 3  Moderate Assistance   LB Dressing Deficit Setup; Requires assistive device for steadying;Verbal cueing;Supervision/safety; Increased time to complete; Thread RLE into pants; Thread LLE into pants; Don/doff R sock; Don/doff L sock; Use of adaptive equipment   LB Dressing Comments educated pt on use of AE to complete LBD; Pt demonstrated understanding w/ mod A and cues to maitain hip precautions throughout   [address]  3  Moderate Assistance   Toileting Deficit Bedside commode;Setup;Verbal cueing;Perineal hygiene; Other (Comment)  (gown mgmt)   Toileting Comments used bedside commode in bathroom to urinate   Bed Mobility   Supine to Sit 3  Moderate assistance   Additional items Assist x 1; Increased time required;Verbal cues;HOB elevated   Sit to Supine Unable to assess   Additional Comments Pt seated OOB in chair w/ PTA, Chayito Rigoberto present at end of session  Transfers   Sit to Stand 4  Minimal assistance   Additional items Assist x 1; Increased time required;Verbal cues   Stand to Sit 4  Minimal assistance   Additional items Assist x 1; Armrests; Increased time required;Verbal cues   Toilet transfer 4  Minimal assistance   Additional items Assist x 1;Increased time required;Commode;Verbal cues;Armrests   Additional Comments pt benefits from significant cues to recall and demonstrate understanding of hip precautions during LB ADL performance, bed mobility, and functional sit <> stand, transfers   Functional Mobility   Functional Mobility 4  Minimal assistance   Additional Comments min A using to / from bathroom   Additional items Rolling walker   Toilet Transfers   Toilet Transfer From Rolling walker   Toilet Transfer Type To and from   Toilet Transfer to Standard bedside commode   Toilet Transfer Technique Ambulating   Toilet Transfers Minimal assistance;Verbal cues   Toilet Transfers Comments min A w/ VC's for hand placement and tech to maintain hip precautions   Cognition   Overall Cognitive Status Impaired   Arousal/Participation Alert; Cooperative   Attention Attends with cues to redirect   Orientation Level Oriented X4   Memory Decreased recall of precautions   Following Commands Follows one step commands with increased time or repetition   Comments Identified pt by full name and birthdate  Pt recalled therapist  Pt benefits from signficant cues to recall hip precautions  At end of session, PTA in to work w/ pt  Pt able to recall 1/3 hip precautions after 20 minutes  Activity Tolerance   Activity Tolerance Patient limited by fatigue;Patient limited by pain   Medical Staff Made Aware spoke to RNLisette and PTAJuanjo   Assessment   Assessment Pt seen for OT tx session day one this afternoon  Pt required less physical assistance and demonstrated increased activity tolerance  Pt engaged in functional mobility to / from bathroom using RW w/ min A  Educated pt on use of AE to max I w/ LBD  Pt unable to recall hip precautions and benefits from cues/ prompts patito  Continue recommend post acute rehab when medically stable for discharge from acute care   Will continue to follow   Plan   Treatment Interventions ADL retraining;Functional transfer training;Patient/family training;Equipment evaluation/education; Activityengagement   Goal Expiration Date 12/13/18   Treatment Day 1   OT Frequency 3-5x/wk   Recommendation   OT Discharge Recommendation (to post acute rehab)   Equipment Recommended Tub seat with back; Bedside commode   OT - OK to Discharge (to post acute rehab)   Barthel Index   Feeding 10   Bathing 0   Grooming Score 5   Dressing Score 5   Bladder Score 10   Bowels Score 10   Toilet Use Score 5   Transfers (Bed/Chair) Score 10   Mobility (Level Surface) Score 0   Stairs Score 0   Barthel Index Score 55   Modified Loida Scale   Modified Loida Scale 4   Lesly Salazar, OT

## 2018-12-07 NOTE — PLAN OF CARE
Problem: OCCUPATIONAL THERAPY ADULT  Goal: Performs self-care activities at highest level of function for planned discharge setting  See evaluation for individualized goals  Treatment Interventions: ADL retraining, Functional transfer training, Patient/family training, Equipment evaluation/education, Compensatory technique education, Continued evaluation, Activityengagement  Equipment Recommended: Tub seat with back, Bedside commode, Other (comment) (AE for LB ADL)       See flowsheet documentation for full assessment, interventions and recommendations  Outcome: Progressing  Limitation: Decreased ADL status, Decreased UE ROM, Decreased self-care trans, Decreased high-level ADLs, Decreased cognition, Decreased Safe judgement during ADL     Assessment: Pt seen for OT tx session day one this afternoon  Pt required less physical assistance and demonstrated increased activity tolerance  Pt engaged in functional mobility to / from bathroom using RW w/ min A  Educated pt on use of AE to max I w/ LBD  Pt unable to recall hip precautions and benefits from cues/ prompts throuhgout  Continue recommend post acute rehab when medically stable for discharge from acute care   Will continue to follow     OT Discharge Recommendation:  (to post acute rehab)  OT - OK to Discharge:  (to post acute rehab)

## 2018-12-07 NOTE — PROGRESS NOTES
Consult Progress Note - Mey Castaneda 1933, 80 y o  male MRN: [id number]    Unit/Bed#: -01 Encounter: [id number]    Primary Care Provider: Alton Liang MD   Date and time admitted to hospital: 2018  1:13 PM    Hypertension   Assessment & Plan    No more orthostasis  /80  Restart amlodipine  Medically ready for discharge     Aortic aneurysm [facility])   Assessment & Plan    Last imagine thoracic AA 4 7*4 4 cm  Stable CT chest     Anemia   Assessment & Plan    Stable hb post op- outpt monitoring     * Closed displaced fracture of right femoral neck (HCC)   Assessment & Plan    For ORIF per orthopedics       Code Status: Level 1 - Full Code updated wife at bedside      Subjective: no complaints    Objective:     Vitals:   Temp (24hrs), Av 3 °F (36 8 °C), Min:98 1 °F (36 7 °C), Max:98 6 °F (37 °C)    Temp:  [98 1 °F (36 7 °C)-98 6 °F (37 °C)] 98 1 °F (36 7 °C)  HR:  [66-86] 67  Resp:  [18-20] 20  BP: (122-161)/(63-86) 122/79  SpO2:  [93 %-96 %] 96 %  Body mass index is 20 08 kg/m²  Input and Output Summary (last 24 hours): Intake/Output Summary (Last 24 hours) at 18 1501  Last data filed at 18 1315   Gross per 24 hour   Intake             1420 ml   Output              750 ml   Net              670 ml       Physical Exam   HENT:   Head: Normocephalic  Eyes: Pupils are equal, round, and reactive to light  Cardiovascular: Normal heart sounds  Pulmonary/Chest: Effort normal    Abdominal: Soft  Neurological: He is alert  Skin: There is pallor           Additional Data:     Labs:      Results from last 7 days  Lab Units 18  0448   WBC Thousand/uL 8 85   HEMOGLOBIN g/dL 11 3*   HEMATOCRIT % 35 8*   PLATELETS Thousands/uL 181       Results from last 7 days  Lab Units 18  0448   POTASSIUM mmol/L 4 3   CHLORIDE mmol/L 106   CO2 mmol/L 26   BUN mg/dL 23   CREATININE mg/dL 1 40*   CALCIUM mg/dL 8 5       Results from last 7 days  Lab Units 12/04/18  1410   INR  1 04       * I Have Reviewed All Lab Data Listed Above  Imaging:  Imaging Personally Reviewed by Myself Includes:     Cultures:   Blood Culture: No results found for: BLOODCX  Urine Culture:   Lab Results   Component Value Date    URINECX No Growth <1000 cfu/mL 07/15/2016     Sputum Culture: No components found for: SPUTUMCX  Wound Culture: No results found for: WOUNDCULT    Last 24 Hours Medication List:     Current Facility-Administered Medications:  acetaminophen 650 mg Oral Q4H PRN Georgine Holts, PA-C   amLODIPine 5 mg Oral Daily Katalina Santacruz MD   atorvastatin 20 mg Oral Daily With [employer], PA-C   calcium carbonate 1,000 mg Oral Daily PRN Georgine Holts, PA-C   docusate sodium 100 mg Oral BID Georgine Holts, PA-C   enoxaparin 30 mg Subcutaneous Q24H [address] Katalina Santacruz MD   Linaclotide 145 mcg Oral Every Other Day Georgine Holts, PA-C   morphine injection 1 mg Intravenous Q4H PRN Georgine Holts, PA-C   ondansetron 4 mg Intravenous Q6H PRN Georgine Holts, PA-C   oxyCODONE 2 5 mg Oral Q4H PRN Georgine Holts, PA-C   oxyCODONE 5 mg Oral Q4H PRN Georgine Holts, PA-C   pantoprazole 40 mg Oral Early Morning Georgine Holts, PA-C   tamsulosin 0 4 mg Oral Daily Georgine Holts, PA-C        Today, Patient Was Seen By: Katalina Santacruz MD    ** Please Note: Dragon 360 Dictation voice to text software may have been used in the creation of this document   **

## 2018-12-07 NOTE — SOCIAL WORK
Received call from patient's daughter making me aware of SNF choices for patient  [facility] is first choice, [address] is second, and [facility] is 3rd  Referrals placed  [facility] will not have a bed until next week but [address] is able to accept today  Daughter and patient agreeable  Non emergent transport form faxed to Willy CODY s/w [facility] who states patient is set up for an 830pm BLS transport via [address] as this is the earliest available time  Pt and wife made aware at bedside and agreeable  S/w pt's daughter lemuel via telephone to make her aware of d/c details

## 2018-12-07 NOTE — PROGRESS NOTES
ICU RN (Neetu ZARAGOZA ) attempted to obtain IV access with ultrasound  Pt became angry and refused to let ICU RN "stick him anymore " I spoke with patient; he is no longer upset but he still refuses to let us attempt to obtain IV access  Pt also refuses to let us use his right arm for any access (IV; labs) as he states "this is the arm I get lab work done on and you cannot use it  I will not let you "  Advised [facility] that pt has no IV access

## 2018-12-07 NOTE — PLAN OF CARE

## 2018-12-07 NOTE — PLAN OF CARE

## 2018-12-08 NOTE — NURSING NOTE
Pt picked up by BLS  Paperwork and pt belongings given to transport team  Pt transported via stretcher  No incidents

## 2018-12-10 NOTE — DISCHARGE SUMMARY
ORTHOPEDICS DISCHARGE SUMMARY   Nathen Welch 80 y o  male MRN: [id number]  Unit/Bed#:       Attending Physician: Dr Rona Peter    Admitting diagnosis: Closed displaced fracture of right femoral neck [facility])    Discharge diagnosis: Closed displaced fracture of right femoral neck [facility])    Date of admission: 12/4/2018    Date of discharge: 12/7/2018    Procedure:   Right hip hemiarthroplasty for femoral neck fracture 12/5/18  Hospital Course:  35-year-old male who presented to the outpatient clinic on 12/4/18 with right hip pain after a fall  X-rays were obtained during this office visit which revealed a displaced femoral neck fracture  He was directly admitted to the hospital under the orthopedic service  Medical clearance was obtained and he underwent a right hip hemiarthroplasty on 12/5/18  After the procedure he was transferred to the floor in stable condition  On the floor he received adequate pain control, DVT prophylaxis with Lovenox, PT/OT, and medical co-management with [facility]  Remainder of his hospital course was unremarkable  He remained hemodynamically stable throughout his hospital stay  He was deemed fit for discharge and discharged in stable condition to [address] [place] [address] on 12/7/18  Discharge Instructions: The patient was discharged weight bearing as tolerated to the right lower extremity  Lovenox will be continued for 28 days  Continue PT/OT  Take pain medications as instructed  Maintain Posterior  hip precautions  Discharge Medications: For the complete list of discharge medications, please refer to the patient's medication reconciliation

## 2018-12-26 ENCOUNTER — OFFICE VISIT (OUTPATIENT)
Dept: OBGYN CLINIC | Facility: CLINIC | Age: 83
End: 2018-12-26

## 2018-12-26 VITALS
BODY MASS INDEX: 20.08 KG/M2 | HEART RATE: 80 BPM | SYSTOLIC BLOOD PRESSURE: 143 MMHG | HEIGHT: 64 IN | DIASTOLIC BLOOD PRESSURE: 82 MMHG

## 2018-12-26 DIAGNOSIS — S72.001A CLOSED DISPLACED FRACTURE OF RIGHT FEMORAL NECK (HCC): Primary | ICD-10-CM

## 2018-12-26 DIAGNOSIS — Z96.641 HISTORY OF HEMIARTHROPLASTY OF RIGHT HIP: ICD-10-CM

## 2018-12-26 PROCEDURE — 99024 POSTOP FOLLOW-UP VISIT: CPT | Performed by: ORTHOPAEDIC SURGERY

## 2018-12-26 NOTE — PROGRESS NOTES
Assessment:  1  Closed displaced fracture of right femoral neck ([address] )     2  History of hemiarthroplasty of right hip         Plan:  3 weeks S/P RIGHT hip hemiarthroplasty performed on 12/5/2018:  · Continue with hip pre cautions as instructed for 3 months  · Use rolling walker with weight bearing as tolerated  To do next visit:  Return in about 4 weeks (around 1/23/2019) for Recheck of RIGHT hip with repeat x rays at this time  The above stated was discussed in layman's terms and the patient expressed understanding  All questions were answered to the patient's satisfaction  Scribe Attestation    I,:   Laura Walsh am acting as a scribe while in the presence of the attending physician :        I,:   Emmie Mckoy MD personally performed the services described in this documentation    as scribed in my presence :              Subjective:   Johnson Adamson is a 80 y o  male who presents today for a post operative visit  He is now about 3 weeks S/P RIGHT hip hemiarthroplasty performed on 12/5/2018  Patient states that he is doing well post operatively  He has minimal to no pain on a daily basis  He continues to ambulate comfortably with a rolling walker  Denies numbness, tingling, fevers or chills       Review of systems negative unless otherwise specified in HPI    Past Medical History:   Diagnosis Date    Anemia     [facility] [address] [facility]     CAD (coronary artery disease)     Chronic kidney disease     Enlarged prostate     GERD (gastroesophageal reflux disease)     Hard of hearing     Hypercholesteremia     Hyperparathyroidism, secondary ([facility] [address] )     Hypertension     IBS (irritable bowel syndrome)     with constipation    Primary osteoarthritis of one hip, right 12/4/2018    Stroke ([facility] [address] )     TIA    TIA (transient ischemic attack)        Past Surgical History:   Procedure Laterality Date    APPENDECTOMY      CATARACT EXTRACTION Bilateral     COLONOSCOPY      FL INJECTION RIGHT HIP (NON ARTHROGRAM)  11/19/2018    HERNIA REPAIR      x3    [place] PARTIAL HIP REPLACEMENT Right 12/5/2018    Procedure: RIGHT HIP HEMIARTHROPLASTY;  Surgeon: Oc Bueno MD;  [facility];  Service: Orthopedics    [place] REPAIR RECURR INGUIN UNA,REDUCIBL Left 11/15/2017    Procedure: RECURRENT [address];  Surgeon: Sharmila Zheng MD;  [facility];  Service: General    SIGMOIDOSCOPY      TONSILLECTOMY         Family History   Problem Relation Age of Onset    No Known Problems Mother     No Known Problems Father        Social History     Occupational History    Not on file  Social History Main Topics    Smoking status: Former Smoker    Smokeless tobacco: Never Used      Comment: quit 1950's    Alcohol use No    Drug use: No    Sexual activity: Not on file       Constitutional:  Well-developed and well-nourished  Eyes:  Anicteric sclerae  Lungs:  Unlabored breathing  Cardiovascular:  Capillary refill is less than 2 seconds  Skin:  Intact without erythema  Neurologic:  Sensation intact to light touch  Psychiatric:  Mood and affect are appropriate  Allergies   Allergen Reactions    Other Rash     Adhesive tape causes a rash            Vitals:    12/26/18 1009   BP: 143/82   Pulse: 80       Objective:                    Right Hip Exam     Tenderness   The patient is experiencing no tenderness           Range of Motion   Flexion: 80     Other   Erythema: absent  Sensation: normal  Pulse: present    Comments:  Staples were removed today  Posterolateral incision remains C/D/I, no signs of infection            Diagnostics, reviewed and taken today if performed as documented:    None performed       Procedures, if performed today:    Procedures    None performed      Portions of the record may have been created with voice recognition software   Occasional wrong word or "sound a like" substitutions may have occurred due to the inherent limitations of voice recognition software   Read the chart carefully and recognize, using context, where substitutions have occurred

## 2019-01-22 ENCOUNTER — TELEPHONE (OUTPATIENT)
Dept: NEPHROLOGY | Facility: HOSPITAL | Age: 84
End: 2019-01-22

## 2019-01-22 NOTE — TELEPHONE ENCOUNTER
I called and spoke with [facility]  Earl De Leonlorene is scheduled to see Dr Landy Monreal on 3/4/19 @ 9:30am  Blood work that was ordered previously has been mailed to the patient

## 2019-01-23 ENCOUNTER — APPOINTMENT (OUTPATIENT)
Dept: RADIOLOGY | Facility: CLINIC | Age: 84
End: 2019-01-23
Payer: COMMERCIAL

## 2019-01-23 ENCOUNTER — OFFICE VISIT (OUTPATIENT)
Dept: OBGYN CLINIC | Facility: CLINIC | Age: 84
End: 2019-01-23

## 2019-01-23 VITALS
BODY MASS INDEX: 20.08 KG/M2 | HEART RATE: 77 BPM | DIASTOLIC BLOOD PRESSURE: 79 MMHG | HEIGHT: 64 IN | SYSTOLIC BLOOD PRESSURE: 142 MMHG

## 2019-01-23 DIAGNOSIS — S72.001A CLOSED DISPLACED FRACTURE OF RIGHT FEMORAL NECK (HCC): Primary | ICD-10-CM

## 2019-01-23 DIAGNOSIS — S72.001A CLOSED DISPLACED FRACTURE OF RIGHT FEMORAL NECK (HCC): ICD-10-CM

## 2019-01-23 PROCEDURE — 99024 POSTOP FOLLOW-UP VISIT: CPT | Performed by: ORTHOPAEDIC SURGERY

## 2019-01-23 PROCEDURE — 73502 X-RAY EXAM HIP UNI 2-3 VIEWS: CPT

## 2019-01-23 NOTE — PROGRESS NOTES
Patient Name:  Nathen Welch  MRN:  [id number]    Assessment     1  Closed displaced fracture of right femoral neck (HCC)  XR hip/pelv 2-3 vws right if performed       Plan     Right hip hemiarthroplasty for femoral neck fracture 12/5/18  1  Weightbearing as tolerated right lower extremity  2  Continue physical therapy and home exercises  3  Continue posterior hip precautions for a total of three months postoperatively  4  Follow-up in six weeks for repeat evaluation with repeat x-rays of the right hip      Subjective     80-year-old male returns to the office today for follow-up regarding his Right hip hemiarthroplasty for femoral neck fracture 12/5/18  Today he denies any pain  He is working with in-home physical therapy  He ambulates with a cane  He denies any weakness or instability  No numbness or tingling  No fevers or chills  Objective     /79   Pulse 77   Ht 5' 4" (1 626 m)   BMI 20 08 kg/m²     Left hip:  No gross deformity  No tenderness to palpation  Hip range of motion is intact without pain  Negative logroll test   5/5 hip flexion abduction and adduction strength  Sensation intact left lower extremity  Skin warm and well perfused  Data Review     I have personally reviewed pertinent films in PACS, and my interpretation follows      X-rays performed today of the right hip reveals stable intact hip prosthesis in satisfactory alignment      Scribe Attestation    I,:   Kiya Nash PA-C am acting as a scribe while in the presence of the attending physician :        I,:   Corine Siddiqui MD personally performed the services described in this documentation    as scribed in my presence :

## 2019-01-24 ENCOUNTER — TELEPHONE (OUTPATIENT)
Dept: OBGYN CLINIC | Facility: CLINIC | Age: 84
End: 2019-01-24

## 2019-01-24 NOTE — TELEPHONE ENCOUNTER
Cb Stinson, Patient's wife wanted clarification on the 81mg ASA  Is this something you want the patient on now personally or is this to resume preop medications?

## 2019-01-24 NOTE — TELEPHONE ENCOUNTER
It looked as though he was taking this prior to his surgery and I think it would be a good idea to start this again

## 2019-01-24 NOTE — TELEPHONE ENCOUNTER
Call from patient's wife- Lili Zheng   Call back # [phone]  Larry Girard would like a call back to discuss a few questions regarding medication on after visit summary, and aspirin that the doctor advised patient to continue

## 2019-02-19 ENCOUNTER — LAB (OUTPATIENT)
Dept: LAB | Age: 84
End: 2019-02-19
Payer: COMMERCIAL

## 2019-02-19 ENCOUNTER — TRANSCRIBE ORDERS (OUTPATIENT)
Dept: ADMINISTRATIVE | Age: 84
End: 2019-02-19

## 2019-02-19 DIAGNOSIS — N18.30 CHRONIC KIDNEY DISEASE, STAGE III (MODERATE) (HCC): Primary | ICD-10-CM

## 2019-02-19 DIAGNOSIS — N18.30 CHRONIC KIDNEY DISEASE, STAGE III (MODERATE) (HCC): ICD-10-CM

## 2019-02-19 LAB
ALBUMIN SERPL BCP-MCNC: 3.4 G/DL (ref 3.5–5)
ALP SERPL-CCNC: 134 U/L (ref 46–116)
ALT SERPL W P-5'-P-CCNC: 23 U/L (ref 12–78)
ANION GAP SERPL CALCULATED.3IONS-SCNC: 6 MMOL/L (ref 4–13)
AST SERPL W P-5'-P-CCNC: 21 U/L (ref 5–45)
BACTERIA UR QL AUTO: ABNORMAL /HPF
BASOPHILS # BLD AUTO: 0.11 THOUSANDS/ΜL (ref 0–0.1)
BASOPHILS NFR BLD AUTO: 1 % (ref 0–1)
BILIRUB SERPL-MCNC: 0.85 MG/DL (ref 0.2–1)
BILIRUB UR QL STRIP: NEGATIVE
BUN SERPL-MCNC: 28 MG/DL (ref 5–25)
CALCIUM SERPL-MCNC: 9.2 MG/DL (ref 8.3–10.1)
CHLORIDE SERPL-SCNC: 101 MMOL/L (ref 100–108)
CLARITY UR: CLEAR
CO2 SERPL-SCNC: 31 MMOL/L (ref 21–32)
COLOR UR: YELLOW
CREAT SERPL-MCNC: 1.49 MG/DL (ref 0.6–1.3)
CREAT UR-MCNC: 150 MG/DL
EOSINOPHIL # BLD AUTO: 0.27 THOUSAND/ΜL (ref 0–0.61)
EOSINOPHIL NFR BLD AUTO: 4 % (ref 0–6)
ERYTHROCYTE [DISTWIDTH] IN BLOOD BY AUTOMATED COUNT: 14.8 % (ref 11.6–15.1)
FERRITIN SERPL-MCNC: 119 NG/ML (ref 8–388)
GFR SERPL CREATININE-BSD FRML MDRD: 42 ML/MIN/1.73SQ M
GLUCOSE P FAST SERPL-MCNC: 90 MG/DL (ref 65–99)
GLUCOSE UR STRIP-MCNC: NEGATIVE MG/DL
HCT VFR BLD AUTO: 39.1 % (ref 36.5–49.3)
HGB BLD-MCNC: 12.2 G/DL (ref 12–17)
HGB UR QL STRIP.AUTO: NEGATIVE
HYALINE CASTS #/AREA URNS LPF: ABNORMAL /LPF
IMM GRANULOCYTES # BLD AUTO: 0.03 THOUSAND/UL (ref 0–0.2)
IMM GRANULOCYTES NFR BLD AUTO: 0 % (ref 0–2)
IRON SATN MFR SERPL: 21 %
IRON SERPL-MCNC: 60 UG/DL (ref 65–175)
KETONES UR STRIP-MCNC: NEGATIVE MG/DL
LEUKOCYTE ESTERASE UR QL STRIP: ABNORMAL
LYMPHOCYTES # BLD AUTO: 2.47 THOUSANDS/ΜL (ref 0.6–4.47)
LYMPHOCYTES NFR BLD AUTO: 32 % (ref 14–44)
MAGNESIUM SERPL-MCNC: 2.4 MG/DL (ref 1.6–2.6)
MCH RBC QN AUTO: 29.5 PG (ref 26.8–34.3)
MCHC RBC AUTO-ENTMCNC: 31.2 G/DL (ref 31.4–37.4)
MCV RBC AUTO: 95 FL (ref 82–98)
MONOCYTES # BLD AUTO: 0.65 THOUSAND/ΜL (ref 0.17–1.22)
MONOCYTES NFR BLD AUTO: 8 % (ref 4–12)
NEUTROPHILS # BLD AUTO: 4.26 THOUSANDS/ΜL (ref 1.85–7.62)
NEUTS SEG NFR BLD AUTO: 55 % (ref 43–75)
NITRITE UR QL STRIP: NEGATIVE
NON-SQ EPI CELLS URNS QL MICRO: ABNORMAL /HPF
NRBC BLD AUTO-RTO: 0 /100 WBCS
PH UR STRIP.AUTO: 6 [PH] (ref 4.5–8)
PHOSPHATE SERPL-MCNC: 3.4 MG/DL (ref 2.3–4.1)
PLATELET # BLD AUTO: 241 THOUSANDS/UL (ref 149–390)
PMV BLD AUTO: 9.4 FL (ref 8.9–12.7)
POTASSIUM SERPL-SCNC: 4.4 MMOL/L (ref 3.5–5.3)
PROT SERPL-MCNC: 7.5 G/DL (ref 6.4–8.2)
PROT UR STRIP-MCNC: NEGATIVE MG/DL
PROT UR-MCNC: 25 MG/DL
PROT/CREAT UR: 0.17 MG/G{CREAT} (ref 0–0.1)
PTH-INTACT SERPL-MCNC: 104.6 PG/ML (ref 18.4–80.1)
RBC # BLD AUTO: 4.13 MILLION/UL (ref 3.88–5.62)
RBC #/AREA URNS AUTO: ABNORMAL /HPF
SODIUM SERPL-SCNC: 138 MMOL/L (ref 136–145)
SP GR UR STRIP.AUTO: 1.02 (ref 1–1.03)
TIBC SERPL-MCNC: 286 UG/DL (ref 250–450)
UROBILINOGEN UR QL STRIP.AUTO: 0.2 E.U./DL
WBC # BLD AUTO: 7.79 THOUSAND/UL (ref 4.31–10.16)
WBC #/AREA URNS AUTO: ABNORMAL /HPF

## 2019-02-19 PROCEDURE — 82728 ASSAY OF FERRITIN: CPT

## 2019-02-19 PROCEDURE — 82570 ASSAY OF URINE CREATININE: CPT | Performed by: INTERNAL MEDICINE

## 2019-02-19 PROCEDURE — 83735 ASSAY OF MAGNESIUM: CPT

## 2019-02-19 PROCEDURE — 80053 COMPREHEN METABOLIC PANEL: CPT

## 2019-02-19 PROCEDURE — 36415 COLL VENOUS BLD VENIPUNCTURE: CPT

## 2019-02-19 PROCEDURE — 83970 ASSAY OF PARATHORMONE: CPT

## 2019-02-19 PROCEDURE — 81001 URINALYSIS AUTO W/SCOPE: CPT | Performed by: INTERNAL MEDICINE

## 2019-02-19 PROCEDURE — 83540 ASSAY OF IRON: CPT

## 2019-02-19 PROCEDURE — 84156 ASSAY OF PROTEIN URINE: CPT | Performed by: INTERNAL MEDICINE

## 2019-02-19 PROCEDURE — 83550 IRON BINDING TEST: CPT

## 2019-02-19 PROCEDURE — 84100 ASSAY OF PHOSPHORUS: CPT

## 2019-02-19 PROCEDURE — 85025 COMPLETE CBC W/AUTO DIFF WBC: CPT

## 2019-02-22 ENCOUNTER — TELEPHONE (OUTPATIENT)
Dept: NEPHROLOGY | Facility: CLINIC | Age: 84
End: 2019-02-22

## 2019-02-22 NOTE — TELEPHONE ENCOUNTER
I called and spoke with Nupur James, wife  She is aware of his stable lab results  No other concerns at the moment

## 2019-02-22 NOTE — TELEPHONE ENCOUNTER
----- Message from Maxi Denton DO sent at 2/20/2019  1:29 PM EST -----  Labs reviewed and currently stable no changes will discuss further with him at his follow-up appointment in March

## 2019-03-01 ENCOUNTER — TELEPHONE (OUTPATIENT)
Dept: NEPHROLOGY | Facility: CLINIC | Age: 84
End: 2019-03-01

## 2019-03-05 ENCOUNTER — APPOINTMENT (OUTPATIENT)
Dept: RADIOLOGY | Facility: CLINIC | Age: 84
End: 2019-03-05
Payer: COMMERCIAL

## 2019-03-05 ENCOUNTER — OFFICE VISIT (OUTPATIENT)
Dept: OBGYN CLINIC | Facility: CLINIC | Age: 84
End: 2019-03-05

## 2019-03-05 VITALS — BODY MASS INDEX: 20.08 KG/M2 | HEIGHT: 64 IN

## 2019-03-05 DIAGNOSIS — S72.001A CLOSED DISPLACED FRACTURE OF RIGHT FEMORAL NECK (HCC): Primary | ICD-10-CM

## 2019-03-05 DIAGNOSIS — S72.001A CLOSED DISPLACED FRACTURE OF RIGHT FEMORAL NECK (HCC): ICD-10-CM

## 2019-03-05 PROCEDURE — 73502 X-RAY EXAM HIP UNI 2-3 VIEWS: CPT

## 2019-03-05 PROCEDURE — 99024 POSTOP FOLLOW-UP VISIT: CPT | Performed by: ORTHOPAEDIC SURGERY

## 2019-03-05 NOTE — PROGRESS NOTES
Patient Name:  Rock Mayes  MRN:  [id number]    Assessment     1  Closed displaced fracture of right femoral neck ([address] )         Plan     Right hip hemiarthroplasty for femoral neck fracture 12/5/18  1  Weightbearing as tolerated right lower extremity  2  Activities as tolerated with modification to avoid pain  3  Follow-up as needed  Subjective     80year-old male returns to the office today for follow-up status post Right hip hemiarthroplasty for femoral neck fracture 12/5/18  Today he denies any pain  He denies any weakness instability  No numbness or tingling  He is happy with his progress  Objective     Ht 5' 4" (1 626 m)   BMI 20 08 kg/m²     Right hip:  No gross deformity  No tenderness to palpation  Hip range of motion is intact and full without discomfort  Negative logroll test   5/5 hip flexion and abduction strength  Sensation intact right lower extremity  Skin warm well perfused  Appropriate mood and affect      Data Review     I have personally reviewed pertinent films in PACS, and my interpretation follows  X-rays performed today of the right hip reveals stable intact hip prosthesis in satisfactory alignment        Scribe Attestation    I,:   Aleksandr Vargas PA-C am acting as a scribe while in the presence of the attending physician :        I,:   Demetrice Bates MD personally performed the services described in this documentation    as scribed in my presence :

## 2019-03-06 ENCOUNTER — TELEPHONE (OUTPATIENT)
Dept: NEPHROLOGY | Facility: CLINIC | Age: 84
End: 2019-03-06

## 2019-03-07 ENCOUNTER — OFFICE VISIT (OUTPATIENT)
Dept: NEPHROLOGY | Facility: CLINIC | Age: 84
End: 2019-03-07
Payer: COMMERCIAL

## 2019-03-07 VITALS — HEART RATE: 72 BPM | SYSTOLIC BLOOD PRESSURE: 134 MMHG | DIASTOLIC BLOOD PRESSURE: 64 MMHG

## 2019-03-07 DIAGNOSIS — N18.30 CKD (CHRONIC KIDNEY DISEASE), STAGE III (HCC): ICD-10-CM

## 2019-03-07 DIAGNOSIS — N25.81 SECONDARY HYPERPARATHYROIDISM (HCC): ICD-10-CM

## 2019-03-07 DIAGNOSIS — N17.9 ACUTE KIDNEY INJURY (HCC): ICD-10-CM

## 2019-03-07 DIAGNOSIS — I10 ESSENTIAL HYPERTENSION: Primary | ICD-10-CM

## 2019-03-07 PROCEDURE — 99214 OFFICE O/P EST MOD 30 MIN: CPT | Performed by: INTERNAL MEDICINE

## 2019-03-07 NOTE — PROGRESS NOTES
OFFICE FOLLOW UP - Nephrology   Imelda Mcallister 80 y o  male MRN: [id number]    Encounter: [id number]      ASSESSMENT and PLAN:  Bc Patrick was seen today for follow-up      Diagnoses and all orders for this visit:    Essential hypertension  - he has a history of an aortic aneurysm, his goal blood pressures less than 140/80  - he seems to be achieving this at home his blood pressures average 120/80 and sometimes go into the 928 systolic range  - he is currently on amlodipine 5 mg a day and furosemide 40 mg daily  -we can continue this regimen blood pressures in the office today are stable    Coronary artery disease involving native coronary artery of native heart without angina pectoris  - following up with Cardiology regularly    Acute kidney injury [facility])  - he has had episodes of this in the past, currently his renal function remains stable and his creatinine at baseline is 1 5    Bilateral renal cysts  - he has a history of bilateral renal cysts, a surveillance CT scan for an aortic aneurysm was done in September and did show slight enlargement of this cyst, repeat renal ultrasound was done in about 1 year ago we can do this every 1-2 years he follows up with Urology as well  -these are benign simple cyst he follows up with Urology as well    CKD (chronic kidney disease), stage III  - his creatinine remained stable at 1 5 his estimated GFR is around 40-45 mL/minute  - his urine output has been stable he is avoiding nephrotoxic agents  - his blood pressures have been well controlled and he has no diabetes  - continue to monitor for anemia related to chronic kidney disease    secondary hyperparathyroidism  - his PTH is around 100, will continue vitamin-D 3 2000 units daily and continue yearly surveillance    Overall is stable after his recent orthopedic surgery no episodes will follow up in 1 year      HPI: Imelda Mcallister is a 80 y o  male who is here for Follow-up and Hypertension    Since our last visit, there has been no ER visits or hospitilizations  Patient currently has no complaints at this time and is feeling well  Patient denies any chest pain, shortness of breath and swelling  The last blood work was done on  January 30th, which we have reviewed together  His recent history is consisted of sacroiliitis and then a femur fracture after a fall he required surgery he tolerated the surgery well had to go to physical therapy any tolerated this well he is now at home his pressures have remained stable as creatinine fortunately has remained stable as well around 1 5 he currently denies any new chest pain shortness of breath fevers chills nausea vomiting diarrhea or constipation    ROS:   All the systems were reviewed and were negative except as documented on the HPI  Allergies:  Other    Medications:   Current Outpatient Medications:     amLODIPine (NORVASC) 5 mg tablet, Take 1 tablet (5 mg total) by mouth daily (Patient taking differently: Take by mouth daily ), Disp: 90 tablet, Rfl: 3    aspirin (ECOTRIN LOW STRENGTH) 81 mg EC tablet, Take 81 mg by mouth daily , Disp: , Rfl:     atorvastatin (LIPITOR) 20 mg tablet, Take 1 tablet by mouth daily, Disp: , Rfl:     Cholecalciferol (VITAMIN D3) 2000 units capsule, Take by mouth daily, Disp: , Rfl:     furosemide (LASIX) 40 mg tablet, 40 mg daily , Disp: , Rfl:     Inulin (METAMUCIL CLEAR & NATURAL PO), Take by mouth, Disp: , Rfl:     Linaclotide (LINZESS) 145 MCG CAPS, Take 1 tablet by mouth every other day, Disp: , Rfl:     Multiple Vitamins-Minerals (ONE DAILY MENS 50+/LYCOPENE) TABS, Take 1 tablet by mouth daily, Disp: , Rfl:     Multiple Vitamins-Minerals (PRESERVISION AREDS) TABS, Take 1 tablet by mouth every 12 (twelve) hours, Disp: , Rfl:     omeprazole (PriLOSEC) 40 MG capsule, Take by mouth, Disp: , Rfl:     potassium chloride (K-DUR) 10 mEq tablet, Take 10 mEq by mouth daily , Disp: , Rfl:     tamsulosin (FLOMAX) 0 4 mg, Take 1 capsule by mouth daily, Disp: , Rfl:     enoxaparin (LOVENOX) 40 mg/0 4 mL, Inject 0 4 mL (40 mg total) under the skin daily for 30 days (Patient not taking: Reported on 3/7/2019), Disp: 28 Syringe, Rfl: 0    Past Medical History:   Diagnosis Date    Anemia     Aortic aneurysm (HCC)     CAD (coronary artery disease)     Chronic kidney disease     Enlarged prostate     GERD (gastroesophageal reflux disease)     Hard of hearing     Hypercholesteremia     Hyperparathyroidism, secondary ([address] )     Hypertension     IBS (irritable bowel syndrome)     with constipation    Primary osteoarthritis of one hip, right 12/4/2018    Stroke [facility])     TIA    TIA (transient ischemic attack)      Past Surgical History:   Procedure Laterality Date    APPENDECTOMY      CATARACT EXTRACTION Bilateral     COLONOSCOPY      FL INJECTION RIGHT HIP (NON ARTHROGRAM)  11/19/2018    HERNIA REPAIR      x3    [place] PARTIAL HIP REPLACEMENT Right 12/5/2018    Procedure: RIGHT HIP HEMIARTHROPLASTY;  Surgeon: Alo Rodriguez MD;  [facility];  Service: Orthopedics    [place] REPAIR RECURR [employer] Left 11/15/2017    Procedure: RECURRENT [address];  Surgeon: Jovanna Plasencia MD;  [facility];  Service: General    SIGMOIDOSCOPY      TONSILLECTOMY       Family History   Problem Relation Age of Onset    No Known Problems Mother     No Known Problems Father       reports that he has quit smoking  He has never used smokeless tobacco  He reports that he does not drink alcohol or use drugs  Physical Exam:   Vitals:    03/07/19 1046   BP: 134/64   Pulse: 72    repeat blood pressure was 128/83    There is no height or weight on file to calculate BMI      General: conscious, cooperative, in not acute distress  Eyes: conjunctivae pink, anicteric sclerae  ENT: lips and mucous membranes moist  Neck: supple, no JVD  Chest: clear breath sounds bilateral, no crackles, ronchus or wheezings  CVS: distinct S1 & S2, normal rate, regular rhythm  Abdomen: soft, non-tender, non-distended, normoactive bowel sounds  Extremities: no edema of both legs  Skin: no rash  Neuro: awake, alert, oriented          Lab Results:    Results for orders placed or performed in visit on 02/19/19   PTH, intact   Result Value Ref Range     6 (H) 18 4 - 80 1 pg/mL   Phosphorus   Result Value Ref Range    Phosphorus 3 4 2 3 - 4 1 mg/dL   Magnesium   Result Value Ref Range    Magnesium 2 4 1 6 - 2 6 mg/dL   Iron Saturation %   Result Value Ref Range    Iron Saturation 21 %    TIBC 286 250 - 450 ug/dL    Iron 60 (L) 65 - 175 ug/dL   Ferritin   Result Value Ref Range    Ferritin 119 8 - 388 ng/mL   Comprehensive metabolic panel   Result Value Ref Range    Sodium 138 136 - 145 mmol/L    Potassium 4 4 3 5 - 5 3 mmol/L    Chloride 101 100 - 108 mmol/L    CO2 31 21 - 32 mmol/L    ANION GAP 6 4 - 13 mmol/L    BUN 28 (H) 5 - 25 mg/dL    Creatinine 1 49 (H) 0 60 - 1 30 mg/dL    Glucose, Fasting 90 65 - 99 mg/dL    Calcium 9 2 8 3 - 10 1 mg/dL    AST 21 5 - 45 U/L    ALT 23 12 - 78 U/L    Alkaline Phosphatase 134 (H) 46 - 116 U/L    Total Protein 7 5 6 4 - 8 2 g/dL    Albumin 3 4 (L) 3 5 - 5 0 g/dL    Total Bilirubin 0 85 0 20 - 1 00 mg/dL    eGFR 42 ml/min/1 73sq m   CBC and differential   Result Value Ref Range    WBC 7 79 4 31 - 10 16 Thousand/uL    RBC 4 13 3 88 - 5 62 Million/uL    Hemoglobin 12 2 12 0 - 17 0 g/dL    Hematocrit 39 1 36 5 - 49 3 %    MCV 95 82 - 98 fL    MCH 29 5 26 8 - 34 3 pg    MCHC 31 2 (L) 31 4 - 37 4 g/dL    RDW 14 8 11 6 - 15 1 %    MPV 9 4 8 9 - 12 7 fL    Platelets 551 179 - 286 Thousands/uL    nRBC 0 /100 WBCs    Neutrophils Relative 55 43 - 75 %    Immat GRANS % 0 0 - 2 %    Lymphocytes Relative 32 14 - 44 %    Monocytes Relative 8 4 - 12 %    Eosinophils Relative 4 0 - 6 %    Basophils Relative 1 0 - 1 %    Neutrophils Absolute 4 26 1 85 - 7 62 Thousands/µL    Immature Grans Absolute 0 03 0 00 - 0 20 Thousand/uL    Lymphocytes Absolute 2  47 0 60 - 4 47 Thousands/µL    Monocytes Absolute 0 65 0 17 - 1 22 Thousand/µL    Eosinophils Absolute 0 27 0 00 - 0 61 Thousand/µL    Basophils Absolute 0 11 (H) 0 00 - 0 10 Thousands/µL         Portions of the record may have been created with voice recognition software  Occasional wrong word or "sound a like" substitutions may have occurred due to the inherent limitations of voice recognition software  Read the chart carefully and recognize, using context, where substitutions have occurred  If you have any questions, please contact the dictating provider

## 2019-03-07 NOTE — PATIENT INSTRUCTIONS
Chronic Kidney Disease   WHAT YOU NEED TO KNOW:   Chronic kidney disease (CKD) is the gradual and permanent loss of kidney function  It is also called chronic kidney failure, or chronic renal insufficiency  Normally, the kidneys remove fluid, chemicals, and waste from your blood  These wastes are turned into urine by your kidneys  CKD may worsen over time and lead to kidney failure  DISCHARGE INSTRUCTIONS:   Return to the emergency department if:   · You are confused and very drowsy  · You have a seizure  · You have shortness of breath  Contact your healthcare provider if:   · You suddenly gain or lose more weight than your healthcare provider has told you is okay  · You have itchy skin or a rash  · You urinate more or less than you normally do  · You have blood in your urine  · You have nausea and repeated vomiting  · You have fatigue or muscle weakness  · You have hiccups that will not stop  · You have questions or concerns about your condition or care  Medicines:   · Medicines  may be given to decrease blood pressure and get rid of extra fluid  You may also receive medicine to manage health conditions that may occur with CKD, such as anemia, diabetes, and heart disease  · Take your medicine as directed  Contact your healthcare provider if you think your medicine is not helping or if you have side effects  Tell him or her if you are allergic to any medicine  Keep a list of the medicines, vitamins, and herbs you take  Include the amounts, and when and why you take them  Bring the list or the pill bottles to follow-up visits  Carry your medicine list with you in case of an emergency  Follow up with your healthcare provider as directed: You will need to return for tests to monitor your kidney function  You may also be referred to a kidney specialist  Write down your questions so you remember to ask them during your visits  Manage other health conditions:   Follow your healthcare provider's directions on how to manage diabetes, high blood pressure, and heart disease  These conditions can make CKD worse  Talk to your healthcare provider before you take over-the-counter medicine  Medicines such as NSAIDs, stomach medicine, or laxatives may harm your kidneys  Weigh yourself daily:  Ask your healthcare provider what your weight should be  Ask how much liquid you should drink each day  CKD may cause you to gain or lose weight rapidly  Weigh yourself every day  Write down your weight, how much liquid you drink or eat, and how much you urinate each day  Contact your healthcare provider if your weight is higher or lower than it should be  Manage CKD:   · Maintain a healthy weight  Ask your healthcare provider how much you should weigh  Ask him to help you create a weight loss plan if you are overweight  · Exercise 30 to 60 minutes a day, 4 to 7 times a week, or as directed  Ask about the best exercise plan for you  Regular exercise can help you manage CKD, high blood pressure, and diabetes  · Follow your healthcare provider's advice about what to eat and drink  He may tell you to eat food low in sodium (salt), potassium, phosphorus, or protein  You may need to see a dietitian if you need help planning meals  Ask how much liquid to drink each day and which liquids are best for you  · Limit alcohol  Ask how much alcohol is safe for you to drink  A drink of alcohol is 12 ounces of beer, 5 ounces of wine, or 1½ ounces of liquor  · Do not smoke  Nicotine and other chemicals in cigarettes and cigars can cause lung and kidney damage  Ask your healthcare provider for information if you currently smoke and need help to quit  E-cigarettes or smokeless tobacco still contain nicotine  Talk to your healthcare provider before you use these products  · Ask your healthcare provider if you need vaccines    Infections such as pneumonia, influenza, and hepatitis can be more harmful or more likely to occur in a person who has CKD  Vaccines reduce your risk of infection with these viruses  © 2017 [facility] Information is for End User's use only and may not be sold, redistributed or otherwise used for commercial purposes  All illustrations and images included in CareNotes® are the copyrighted property of A D A M , Inc  or [employer]  The above information is an  only  It is not intended as medical advice for individual conditions or treatments  Talk to your doctor, nurse or pharmacist before following any medical regimen to see if it is safe and effective for you

## 2019-03-20 ENCOUNTER — APPOINTMENT (OUTPATIENT)
Dept: LAB | Age: 84
End: 2019-03-20
Payer: COMMERCIAL

## 2019-03-20 ENCOUNTER — TRANSCRIBE ORDERS (OUTPATIENT)
Dept: ADMINISTRATIVE | Age: 84
End: 2019-03-20

## 2019-03-20 DIAGNOSIS — N18.30 CKD (CHRONIC KIDNEY DISEASE), STAGE III (HCC): ICD-10-CM

## 2019-03-20 DIAGNOSIS — N40.1 ENLARGED PROSTATE WITH URINARY OBSTRUCTION: ICD-10-CM

## 2019-03-20 DIAGNOSIS — N13.8 ENLARGED PROSTATE WITH URINARY OBSTRUCTION: ICD-10-CM

## 2019-03-20 DIAGNOSIS — I10 ESSENTIAL HYPERTENSION: ICD-10-CM

## 2019-03-20 DIAGNOSIS — N17.9 ACUTE KIDNEY INJURY (HCC): ICD-10-CM

## 2019-03-20 DIAGNOSIS — N13.8 ENLARGED PROSTATE WITH URINARY OBSTRUCTION: Primary | ICD-10-CM

## 2019-03-20 DIAGNOSIS — N40.1 ENLARGED PROSTATE WITH URINARY OBSTRUCTION: Primary | ICD-10-CM

## 2019-03-20 DIAGNOSIS — N25.81 SECONDARY HYPERPARATHYROIDISM (HCC): ICD-10-CM

## 2019-03-20 LAB
BUN SERPL-MCNC: 23 MG/DL (ref 5–25)
CREAT SERPL-MCNC: 1.49 MG/DL (ref 0.6–1.3)
GFR SERPL CREATININE-BSD FRML MDRD: 42 ML/MIN/1.73SQ M
PSA SERPL-MCNC: 11.7 NG/ML (ref 0–4)

## 2019-03-20 PROCEDURE — 36415 COLL VENOUS BLD VENIPUNCTURE: CPT

## 2019-03-20 PROCEDURE — 84153 ASSAY OF PSA TOTAL: CPT

## 2019-03-20 PROCEDURE — 84520 ASSAY OF UREA NITROGEN: CPT

## 2019-03-20 PROCEDURE — 82565 ASSAY OF CREATININE: CPT

## 2019-04-30 ENCOUNTER — APPOINTMENT (OUTPATIENT)
Dept: LAB | Age: 84
End: 2019-04-30
Payer: COMMERCIAL

## 2019-04-30 ENCOUNTER — TRANSCRIBE ORDERS (OUTPATIENT)
Dept: ADMINISTRATIVE | Age: 84
End: 2019-04-30

## 2019-04-30 DIAGNOSIS — N40.1 ENLARGED PROSTATE WITH URINARY OBSTRUCTION: Primary | ICD-10-CM

## 2019-04-30 DIAGNOSIS — R97.20 ELEVATED PROSTATE SPECIFIC ANTIGEN (PSA): ICD-10-CM

## 2019-04-30 DIAGNOSIS — N40.1 ENLARGED PROSTATE WITH URINARY OBSTRUCTION: ICD-10-CM

## 2019-04-30 DIAGNOSIS — N13.8 ENLARGED PROSTATE WITH URINARY OBSTRUCTION: ICD-10-CM

## 2019-04-30 DIAGNOSIS — N13.8 ENLARGED PROSTATE WITH URINARY OBSTRUCTION: Primary | ICD-10-CM

## 2019-04-30 LAB — PSA SERPL-MCNC: 3.5 NG/ML (ref 0–4)

## 2019-04-30 PROCEDURE — G0103 PSA SCREENING: HCPCS

## 2019-04-30 PROCEDURE — 36415 COLL VENOUS BLD VENIPUNCTURE: CPT

## 2019-10-22 ENCOUNTER — APPOINTMENT (EMERGENCY)
Dept: RADIOLOGY | Facility: HOSPITAL | Age: 84
End: 2019-10-22
Payer: COMMERCIAL

## 2019-10-22 ENCOUNTER — HOSPITAL ENCOUNTER (OUTPATIENT)
Facility: HOSPITAL | Age: 84
Setting detail: OBSERVATION
Discharge: HOME | End: 2019-10-22
Attending: EMERGENCY MEDICINE | Admitting: INTERNAL MEDICINE
Payer: COMMERCIAL

## 2019-10-22 VITALS
WEIGHT: 128.53 LBS | OXYGEN SATURATION: 98 % | DIASTOLIC BLOOD PRESSURE: 86 MMHG | SYSTOLIC BLOOD PRESSURE: 158 MMHG | TEMPERATURE: 98.3 F | RESPIRATION RATE: 16 BRPM | HEART RATE: 58 BPM | BODY MASS INDEX: 22.06 KG/M2

## 2019-10-22 DIAGNOSIS — R41.82 ALTERED MENTAL STATUS, UNSPECIFIED ALTERED MENTAL STATUS TYPE: ICD-10-CM

## 2019-10-22 DIAGNOSIS — N17.9 AKI (ACUTE KIDNEY INJURY) (HCC): ICD-10-CM

## 2019-10-22 DIAGNOSIS — R41.0 CONFUSION: Primary | ICD-10-CM

## 2019-10-22 LAB
ANION GAP SERPL CALCULATED.3IONS-SCNC: 6 MMOL/L (ref 4–13)
BASOPHILS # BLD AUTO: 0.09 THOUSANDS/ΜL (ref 0–0.1)
BASOPHILS NFR BLD AUTO: 1 % (ref 0–1)
BILIRUB UR QL STRIP: NEGATIVE
BUN SERPL-MCNC: 33 MG/DL (ref 5–25)
CALCIUM SERPL-MCNC: 9.1 MG/DL (ref 8.3–10.1)
CHLORIDE SERPL-SCNC: 106 MMOL/L (ref 100–108)
CLARITY UR: CLEAR
CO2 SERPL-SCNC: 29 MMOL/L (ref 21–32)
COLOR UR: YELLOW
CREAT SERPL-MCNC: 1.72 MG/DL (ref 0.6–1.3)
EOSINOPHIL # BLD AUTO: 0.27 THOUSAND/ΜL (ref 0–0.61)
EOSINOPHIL NFR BLD AUTO: 4 % (ref 0–6)
ERYTHROCYTE [DISTWIDTH] IN BLOOD BY AUTOMATED COUNT: 14.8 % (ref 11.6–15.1)
GFR SERPL CREATININE-BSD FRML MDRD: 35 ML/MIN/1.73SQ M
GLUCOSE SERPL-MCNC: 114 MG/DL (ref 65–140)
GLUCOSE UR STRIP-MCNC: NEGATIVE MG/DL
HCT VFR BLD AUTO: 39 % (ref 36.5–49.3)
HGB BLD-MCNC: 12.2 G/DL (ref 12–17)
HGB UR QL STRIP.AUTO: NEGATIVE
IMM GRANULOCYTES # BLD AUTO: 0.02 THOUSAND/UL (ref 0–0.2)
IMM GRANULOCYTES NFR BLD AUTO: 0 % (ref 0–2)
KETONES UR STRIP-MCNC: NEGATIVE MG/DL
LEUKOCYTE ESTERASE UR QL STRIP: NEGATIVE
LYMPHOCYTES # BLD AUTO: 1.9 THOUSANDS/ΜL (ref 0.6–4.47)
LYMPHOCYTES NFR BLD AUTO: 30 % (ref 14–44)
MCH RBC QN AUTO: 29.3 PG (ref 26.8–34.3)
MCHC RBC AUTO-ENTMCNC: 31.3 G/DL (ref 31.4–37.4)
MCV RBC AUTO: 94 FL (ref 82–98)
MONOCYTES # BLD AUTO: 0.64 THOUSAND/ΜL (ref 0.17–1.22)
MONOCYTES NFR BLD AUTO: 10 % (ref 4–12)
NEUTROPHILS # BLD AUTO: 3.47 THOUSANDS/ΜL (ref 1.85–7.62)
NEUTS SEG NFR BLD AUTO: 55 % (ref 43–75)
NITRITE UR QL STRIP: NEGATIVE
NRBC BLD AUTO-RTO: 0 /100 WBCS
PH UR STRIP.AUTO: 7 [PH] (ref 4.5–8)
PLATELET # BLD AUTO: 217 THOUSANDS/UL (ref 149–390)
PMV BLD AUTO: 9.3 FL (ref 8.9–12.7)
POTASSIUM SERPL-SCNC: 3.6 MMOL/L (ref 3.5–5.3)
PROT UR STRIP-MCNC: NEGATIVE MG/DL
RBC # BLD AUTO: 4.16 MILLION/UL (ref 3.88–5.62)
SODIUM SERPL-SCNC: 141 MMOL/L (ref 136–145)
SP GR UR STRIP.AUTO: 1.01 (ref 1–1.03)
TROPONIN I SERPL-MCNC: <0.02 NG/ML
UROBILINOGEN UR QL STRIP.AUTO: 0.2 E.U./DL
WBC # BLD AUTO: 6.39 THOUSAND/UL (ref 4.31–10.16)

## 2019-10-22 PROCEDURE — 84484 ASSAY OF TROPONIN QUANT: CPT | Performed by: EMERGENCY MEDICINE

## 2019-10-22 PROCEDURE — 99285 EMERGENCY DEPT VISIT HI MDM: CPT

## 2019-10-22 PROCEDURE — 93005 ELECTROCARDIOGRAM TRACING: CPT

## 2019-10-22 PROCEDURE — 99285 EMERGENCY DEPT VISIT HI MDM: CPT | Performed by: EMERGENCY MEDICINE

## 2019-10-22 PROCEDURE — 81003 URINALYSIS AUTO W/O SCOPE: CPT

## 2019-10-22 PROCEDURE — 85025 COMPLETE CBC W/AUTO DIFF WBC: CPT | Performed by: EMERGENCY MEDICINE

## 2019-10-22 PROCEDURE — 70450 CT HEAD/BRAIN W/O DYE: CPT

## 2019-10-22 PROCEDURE — 36415 COLL VENOUS BLD VENIPUNCTURE: CPT | Performed by: EMERGENCY MEDICINE

## 2019-10-22 PROCEDURE — 96361 HYDRATE IV INFUSION ADD-ON: CPT

## 2019-10-22 PROCEDURE — 96360 HYDRATION IV INFUSION INIT: CPT

## 2019-10-22 PROCEDURE — 80048 BASIC METABOLIC PNL TOTAL CA: CPT | Performed by: EMERGENCY MEDICINE

## 2019-10-22 PROCEDURE — 71046 X-RAY EXAM CHEST 2 VIEWS: CPT

## 2019-10-22 RX ORDER — MULTIVIT-MIN/FOLIC/VIT K/LYCOP 400-300MCG
1 TABLET ORAL DAILY
COMMUNITY

## 2019-10-22 RX ORDER — ACETAMINOPHEN 325 MG/1
650 TABLET ORAL EVERY 6 HOURS PRN
COMMUNITY

## 2019-10-22 RX ADMIN — SODIUM CHLORIDE 1000 ML: 0.9 INJECTION, SOLUTION INTRAVENOUS at 13:21

## 2019-10-22 RX ADMIN — SODIUM CHLORIDE 1000 ML: 0.9 INJECTION, SOLUTION INTRAVENOUS at 10:57

## 2019-10-22 NOTE — SOCIAL WORK
CM consult for ISAR >=3  Met with pt, pt wife Treva, and son Jose R Olivares at bedside to discuss role of CM and to discuss any needs pt may have prior to d/c  Pt lives with his wife in a 2 SH, 1 BUTCH, 13 steps to the 2nd floor bedroom and bathroom  Home has a chair lift to access 2nd floor  Home has 1/2 bath on 1st floor  Pt performed ADLs independently pta  Pt ambulates independently with the use of a cane  Pt drives  Pt has past hx of STR at [facility] and hx of [facility]  Pt denies h/o MH or D&A tx  PCP: Dr Chinmay Gallegos  [facility]    Contact: Davene Lesches (wife) [phone]  Pt wife Gali Hampton is POA  Pt family will transport at time of d/c  ISAR--  Pt denied needing daily help and denied need for any additional help in the past 24 hours  Pt denied recent hospitalizations  Pt denies any sight issues  Pt denies issues with memory although son states he has "some issues" and that there is a family hx of Alzheimer's  Pt wife helps manage medication  Wife will fill a weekly pill box for pt  Pt is compliant with medications  Family denied any concerns filling or paying for medications  D/C recommendations unknown at this time  Pt to be admitted to the floor for OBS  Assigned CM to follow pt for d/c dcp

## 2019-10-22 NOTE — ED ATTENDING ATTESTATION
10/22/2019  I, Leah Perez MD, saw and evaluated the patient  I have discussed the patient with the resident/non-physician practitioner and agree with the resident's/non-physician practitioner's findings, Plan of Care, and MDM as documented in the resident's/non-physician practitioner's note, except where noted  All available labs and Radiology studies were reviewed  I was present for key portions of any procedure(s) performed by the resident/non-physician practitioner and I was immediately available to provide assistance  At this point I agree with the current assessment done in the Emergency Department  I have conducted an independent evaluation of this patient a history and physical is as follows:  History is obtained from the patient and his wife  The patient has been in his normal state of health recently but this morning he had approximately a 1 hour episode of expressive aphasia and dysarthria  The patient and the wife reports that the patient was making sounds and the patient knew what he wanted to say but was unable to make a sounds  The patient denies any other associated symptoms  Specifically the patient denies headache, weakness, paresthesias, change in comprehension, or other neurologic deficits  The patient states that he improved after EMS arrival and is back to normal now without any complaint whatsoever  Physical exam demonstrates a male in no acute distress  HEENT exam is normal   Lungs are clear with equal breath sounds  The heart had a regular rate rhythm  Skin had no rash  Patient was alert and oriented times 3  The patient knew the month but did not know the year  The patient's wife reports that sometimes he is forgetful and this is not atypical for him  The patient had normal strength and sensation all extremities    Cranial nerves normal   His speech was normal   ED Course         Critical Care Time  Procedures

## 2019-10-22 NOTE — ED NOTES
Patient transported to [facility], RN  10/22/19 1120

## 2019-10-22 NOTE — ED NOTES
Pt signing out AMA at this time with dr Forest Meeks from internal medicine       Yamel Hook RN  10/22/19 1390

## 2019-10-22 NOTE — ED NOTES
Patient's wife reports that for the past two weeks patient has bee complaining about increased urinary frequency  Foul smelling odor of urine noted   Patient given urinal to try to produce urine sample      Griselda Sanchez RN  10/22/19 5824

## 2019-10-22 NOTE — QUICK NOTE
Patient is a 51-year-old  male who presented to the emergency department with complaints of dysarthria and expressive aphasia for approximately 30 minutes that occurred at 8:30 a m  This morning  The episode was witnessed by his wife who stated that at around 8:30 a m  She was called by patient, and noticed on speaking with him noticed that his sentences made no sense  Initially patient stated that he did not notice the change in speech, but a few minutes later realized t he was having difficulty finding words  Wife called EMS who responded and, according to wife, also witnessed  the patient had difficulty speaking  It should be noted the patient had similar episode approximately 15 years ago that was determined to be a TIA  On presentation to the hospital, however, dysarthria and expressive aphasia had resolved and patient was noted to be speaking clearly on examination  CT of head showed no abnormality, and ED staff requested to observe the patient overnight with stroke protocol to ensure there was no long-term sequela  Patient also noted to have a KI on CKD with serum creatinine of 1 72 (baseline is 1 4-1 5)  Patient denied dysuria or hematuria and stated that he is followed by Urology and is compliant with his Flomax  On examination patient was GCS 15, alert and oriented x3, and CAM ICU negative  Answered all examiner questions appropriately and no  dysarthria or aphasia was noted  Cranial nerves 2-12 appear grossly intact  He stated that he did not Wanna stay in the hospital overnight, and felt well and wished to go home  Patient, in my estimation, had decision-making capacity and wished to go AMA despite my advising that and in a KI and potential TIA  He voiced understanding but stated that he would follow up with urologist on discharge and was having no further speech problems    Patient denied chest pain, headaches, lightheadedness, dizziness, nausea/vomiting, diarrhea/constipation, abdominal pain, dysuria, hematuria, or any other acute issues

## 2019-10-23 LAB
ATRIAL RATE: 55 BPM
P AXIS: 51 DEGREES
PR INTERVAL: 160 MS
QRS AXIS: 61 DEGREES
QRSD INTERVAL: 84 MS
QT INTERVAL: 432 MS
QTC INTERVAL: 413 MS
T WAVE AXIS: 73 DEGREES
VENTRICULAR RATE: 55 BPM

## 2019-10-23 PROCEDURE — 93010 ELECTROCARDIOGRAM REPORT: CPT | Performed by: INTERNAL MEDICINE

## 2019-10-24 NOTE — ED PROVIDER NOTES
History  Chief Complaint   Patient presents with    Altered Mental Status     As per wife, patient started to be more confused and slurred speech this morning  This is an 68-year-old gentleman who has history of hypertension, TIA who is presenting to the emergency department after brief episode of aphasia and dysarthria  He says that for a couple minutes he felt as though he could not speak and couldn't find the words to speak  His wife was concerned and so they came into the emergency department with her son  This episode resolved on its own after a few minutes  He had a similar episode couple years ago  At that time they believed he had a TIA  He denies any current symptoms  He denies any weakness or facial droop  He says that he is not currently says slurring  His wife and his son believe that he is at baseline  His wife says that he has had a couple days of increased frequency and he has been going to the bathroom every 5 minutes  He denies any other symptoms  He has not any nausea, vomiting, diarrhea, fever or chills  He currently has no complaints  History provided by:  Patient, relative and spouse   used: No    Altered Mental Status   Presenting symptoms: behavior changes    Presenting symptoms comment:  Unable to speak  Severity:  Mild  Episode history:  Single  Timing:  Constant  Progression:  Resolved  Chronicity:  Recurrent  Context: not head injury, not homeless and not recent infection    Associated symptoms: slurred speech    Associated symptoms: no abdominal pain, no eye deviation, no fever, no headaches, no light-headedness, no nausea, no rash, no visual change, no vomiting and no weakness        Prior to Admission Medications   Prescriptions Last Dose Informant Patient Reported? Taking?    Cholecalciferol (VITAMIN D3) 2000 units capsule  Spouse/Significant Other Yes Yes   Sig: Take by mouth daily   Inulin (METAMUCIL CLEAR & NATURAL PO)  Spouse/Significant Other Yes Yes   Sig: Take by mouth   Linaclotide (LINZESS) 145 MCG CAPS  Spouse/Significant Other Yes Yes   Sig: Take 1 tablet by mouth every other day   Multiple Vitamin (ONE-A-DAY MENS) TABS   Yes Yes   Sig: Take 1 tablet by mouth daily   Multiple Vitamins-Minerals (PRESERVISION AREDS) TABS  Spouse/Significant Other Yes Yes   Sig: Take 1 tablet by mouth every 12 (twelve) hours   acetaminophen (TYLENOL) 325 mg tablet   Yes Yes   Sig: Take 650 mg by mouth every 6 (six) hours as needed for mild pain   amLODIPine (NORVASC) 5 mg tablet  Spouse/Significant Other No Yes   Sig: Take 1 tablet (5 mg total) by mouth daily   Patient taking differently: Take 2 5 mg by mouth daily    aspirin (ECOTRIN LOW STRENGTH) 81 mg EC tablet  Spouse/Significant Other Yes Yes   Sig: Take 81 mg by mouth daily    atorvastatin (LIPITOR) 20 mg tablet  Spouse/Significant Other Yes No   Sig: Take 1 tablet by mouth daily at bedtime    enoxaparin (LOVENOX) 40 mg/0 4 mL   No No   Sig: Inject 0 4 mL (40 mg total) under the skin daily for 30 days   Patient not taking: Reported on 3/7/2019   furosemide (LASIX) 40 mg tablet  Spouse/Significant Other Yes Yes   Si mg daily    magnesium hydroxide (MILK OF MAGNESIA) 400 mg/5 mL oral suspension   Yes Yes   Sig: Take 30 mL by mouth daily as needed for constipation   omeprazole (PriLOSEC) 40 MG capsule  Spouse/Significant Other Yes Yes   Sig: Take by mouth   potassium chloride (K-DUR) 10 mEq tablet  Spouse/Significant Other Yes Yes   Sig: Take 10 mEq by mouth daily    tamsulosin (FLOMAX) 0 4 mg  Spouse/Significant Other Yes Yes   Sig: Take 1 capsule by mouth daily      Facility-Administered Medications: None       Past Medical History:   Diagnosis Date    Anemia     Aortic aneurysm (HCC)     CAD (coronary artery disease)     Chronic kidney disease     Enlarged prostate     GERD (gastroesophageal reflux disease)     Hard of hearing     Hypercholesteremia     Hyperparathyroidism, secondary ([address] )  Hypertension     IBS (irritable bowel syndrome)     with constipation    Primary osteoarthritis of one hip, right 12/4/2018    Stroke [facility])     TIA    TIA (transient ischemic attack)        Past Surgical History:   Procedure Laterality Date    APPENDECTOMY      CATARACT EXTRACTION Bilateral     COLONOSCOPY      FL INJECTION RIGHT HIP (NON ARTHROGRAM)  11/19/2018    HERNIA REPAIR      x3    OK PARTIAL HIP REPLACEMENT Right 12/5/2018    Procedure: RIGHT HIP HEMIARTHROPLASTY;  Surgeon: Cuong Ferrera MD;  [facility];  Service: Orthopedics    OK REPAIR RECURR INGUIN UNA,REDUCIBL Left 11/15/2017    Procedure: RECURRENT [address];  Surgeon: Saritha Nuno MD;  [facility];  Service: General    SIGMOIDOSCOPY      TONSILLECTOMY         Family History   Problem Relation Age of Onset    No Known Problems Mother     No Known Problems Father      I have reviewed and agree with the history as documented  Social History     Tobacco Use    Smoking status: Former Smoker    Smokeless tobacco: Never Used    Tobacco comment: quit 1950's   Substance Use Topics    Alcohol use: No    Drug use: No        Review of Systems   Constitutional: Negative for chills, diaphoresis and fever  HENT: Negative  Eyes: Negative  Negative for visual disturbance  Respiratory: Negative  Negative for shortness of breath  Cardiovascular: Negative  Negative for chest pain  Gastrointestinal: Negative  Negative for abdominal pain, nausea and vomiting  Endocrine: Negative  Genitourinary: Positive for frequency and urgency  Negative for dysuria  Musculoskeletal: Negative  Negative for myalgias  Skin: Negative  Negative for rash  Allergic/Immunologic: Negative  Neurological: Positive for speech difficulty  Negative for dizziness, tremors, facial asymmetry, weakness, light-headedness, numbness and headaches  Hematological: Negative      All other systems reviewed and are negative  Physical Exam  ED Triage Vitals [10/22/19 0944]   Temperature Pulse Respirations Blood Pressure SpO2   98 3 °F (36 8 °C) 68 18 168/94 98 %      Temp Source Heart Rate Source Patient Position - Orthostatic VS BP Location FiO2 (%)   Oral Monitor Lying Right arm --      Pain Score       No Pain             Orthostatic Vital Signs  Vitals:    10/22/19 1345 10/22/19 1400 10/22/19 1430 10/22/19 1445   BP: 158/93 168/92 153/94 158/86   Pulse: 60 62 68 58   Patient Position - Orthostatic VS: Lying Lying Lying Lying       Physical Exam   Constitutional: He is oriented to person, place, and time  He appears well-developed and well-nourished  HENT:   Head: Normocephalic and atraumatic  Right Ear: External ear normal    Left Ear: External ear normal    Mouth/Throat: Oropharynx is clear and moist    Eyes: Pupils are equal, round, and reactive to light  Conjunctivae and EOM are normal    Neck: Normal range of motion  Neck supple  Cardiovascular: Normal rate  Pulmonary/Chest: Effort normal and breath sounds normal    Abdominal: Soft  He exhibits no distension  There is no tenderness  Musculoskeletal: Normal range of motion  Neurological: He is alert and oriented to person, place, and time  He displays normal reflexes  No cranial nerve deficit or sensory deficit  He exhibits normal muscle tone  Coordination normal  GCS eye subscore is 4  GCS verbal subscore is 5  GCS motor subscore is 6  Skin: Skin is warm and dry  Psychiatric: He has a normal mood and affect  Nursing note and vitals reviewed        ED Medications  Medications   sodium chloride 0 9 % bolus 1,000 mL (0 mL Intravenous Stopped 10/22/19 1311)   sodium chloride 0 9 % bolus 1,000 mL (0 mL Intravenous Stopped 10/22/19 1506)       Diagnostic Studies  Results Reviewed     Procedure Component Value Units Date/Time    POCT urinalysis dipstick [[id number]]  (Normal) Resulted:  10/22/19 1438    Lab Status:  Final result Updated:  10/22/19 1438 ED Urine Macroscopic [[id number]] Collected:  10/22/19 1440    Lab Status:  Final result Specimen:  Urine Updated:  10/22/19 1437     Color, UA Yellow     Clarity, UA Clear     pH, UA 7 0     Leukocytes, UA Negative     Nitrite, UA Negative     Protein, UA Negative mg/dl      Glucose, UA Negative mg/dl      Ketones, UA Negative mg/dl      Urobilinogen, UA 0 2 E U /dl      Bilirubin, UA Negative     Blood, UA Negative     Specific Gravity, UA 1 015    Narrative:       CLINITEK RESULT    Troponin I [[id number]]  (Normal) Collected:  10/22/19 1048    Lab Status:  Final result Specimen:  Blood from Arm, Left Updated:  10/22/19 1111     Troponin I <0 02 ng/mL     Basic metabolic panel [[id number]]  (Abnormal) Collected:  10/22/19 1048    Lab Status:  Final result Specimen:  Blood from Arm, Left Updated:  10/22/19 1108     Sodium 141 mmol/L      Potassium 3 6 mmol/L      Chloride 106 mmol/L      CO2 29 mmol/L      ANION GAP 6 mmol/L      BUN 33 mg/dL      Creatinine 1 72 mg/dL      Glucose 114 mg/dL      Calcium 9 1 mg/dL      eGFR 35 ml/min/1 73sq m     Narrative:       [place] guidelines for Chronic Kidney Disease (CKD):     Stage 1 with normal or high GFR (GFR > 90 mL/min/1 73 square meters)    Stage 2 Mild CKD (GFR = 60-89 mL/min/1 73 square meters)    Stage 3A Moderate CKD (GFR = 45-59 mL/min/1 73 square meters)    Stage 3B Moderate CKD (GFR = 30-44 mL/min/1 73 square meters)    Stage 4 Severe CKD (GFR = 15-29 mL/min/1 73 square meters)    Stage 5 End Stage CKD (GFR <15 mL/min/1 73 square meters)  Note: GFR calculation is accurate only with a steady state creatinine    CBC and differential [[id number]]  (Abnormal) Collected:  10/22/19 1048    Lab Status:  Final result Specimen:  Blood from Arm, Left Updated:  10/22/19 1056     WBC 6 39 Thousand/uL      RBC 4 16 Million/uL      Hemoglobin 12 2 g/dL      Hematocrit 39 0 %      MCV 94 fL      MCH 29 3 pg      MCHC 31 3 g/dL      RDW 14 8 % MPV 9 3 fL      Platelets 694 Thousands/uL      nRBC 0 /100 WBCs      Neutrophils Relative 55 %      Immat GRANS % 0 %      Lymphocytes Relative 30 %      Monocytes Relative 10 %      Eosinophils Relative 4 %      Basophils Relative 1 %      Neutrophils Absolute 3 47 Thousands/µL      Immature Grans Absolute 0 02 Thousand/uL      Lymphocytes Absolute 1 90 Thousands/µL      Monocytes Absolute 0 64 Thousand/µL      Eosinophils Absolute 0 27 Thousand/µL      Basophils Absolute 0 09 Thousands/µL                  XR chest 2 views   Final Result by Bg Morales MD (10/22 4425)      No acute cardiopulmonary disease  Workstation performed: [id number]         CT head without contrast   Final Result by Stormy Yanes MD (10/22 2848)      No acute intracranial abnormality  Microangiopathic changes  Workstation performed: [id number]               Procedures  ECG 12 Lead Documentation Only  Date/Time: 10/15/2019 2:21 PM  Performed by: Fidelina Alexandra MD  Authorized by: Fidelina Alexandra MD     ECG reviewed by me, the ED Provider: yes    Patient location:  ED  Previous ECG:     Previous ECG:  Unavailable    Comparison to cardiac monitor: No    Interpretation:     Interpretation: normal    Rate:     ECG rate assessment: bradycardic    Rhythm:     Rhythm: sinus bradycardia    Ectopy:     Ectopy: none    QRS:     QRS axis:  Normal    QRS intervals:  Normal  Conduction:     Conduction: normal    ST segments:     ST segments:  Normal  T waves:     T waves: normal              ED Course  ED Course as of Oct 24 0715   Tue Oct 22, 2019   1339 Patient still has not voided  Gave water and is on 2nd L              Identification of Seniors at Risk      Most Recent Value   (ISAR) Identification of Seniors at Risk   Before the illness or injury that brought you to the Emergency, did you need someone to help you on a regular basis?   1 Filed at: 10/22/2019 0946   In the last 24 hours, have you needed more help than usual?  1 Filed at: 10/22/2019 6288   Have you been hospitalized for one or more nights during the past 6 months? 1 Filed at: 10/22/2019 0946   In general, do you see well? 1 Filed at: 10/22/2019 0946   In general, do you have serious problems with your memory? 1 Filed at: 10/22/2019 4099   Do you take more than three different medications every day? 1 Filed at: 10/22/2019 0946   ISAR Score  6 Filed at: 10/22/2019 0946                          MDM  Number of Diagnoses or Management Options  ROXANNE (acute kidney injury) ([facility] [address] ): new and requires workup  Altered mental status, unspecified altered mental status type: new and requires workup  Confusion: new and requires workup  Diagnosis management comments: 59-year-old gentleman who has a history of TIA in 2014 presenting with confusion, aphasia, dysarthria that has since resolved  The episode only lasted for couple minutes and resolved on its own  He currently does not have any symptoms here  We will do a altered mental status workup, but he is currently not altered  His EKG and his troponin were normal   His CT scan was normal   His electrolytes were within normal limits, but he did have a mild ROXANNE on CKD  His urinalysis was normal     He was admitted for [facility] to [facility] and further workup via stroke pathway  We answered all his questions  While he was still in the emergency department, SLIM came down to evaluate him and he signed out AMA  We told him that could always come back in the event that the symptoms worsened or he had any new complaints    We told him he should follow up with his primary care doctor if he did not want to be admitted to the hospital        Amount and/or Complexity of Data Reviewed  Clinical lab tests: ordered and reviewed  Tests in the radiology section of CPT®: ordered and reviewed  Tests in the medicine section of CPT®: ordered and reviewed  Discussion of test results with the performing providers: yes  Decide to obtain previous medical records or to obtain history from someone other than the patient: yes  Obtain history from someone other than the patient: yes  Review and summarize past medical records: yes  Discuss the patient with other providers: yes  Independent visualization of images, tracings, or specimens: yes    Risk of Complications, Morbidity, and/or Mortality  Presenting problems: moderate  Diagnostic procedures: low  Management options: low    Patient Progress  Patient progress: stable      Disposition  Final diagnoses:   Confusion   Altered mental status, unspecified altered mental status type     Time reflects when diagnosis was documented in both MDM as applicable and the Disposition within this note     Time User Action Codes Description Comment    10/22/2019  3:31 PM Arloa Pillar Add [R41 0] Confusion     10/22/2019  3:31 PM Arloa Pillar Add [R41 82] Altered mental status, unspecified altered mental status type       ED Disposition     ED Disposition Condition Date/Time Comment    AMA  Tue Oct 22, 2019 1603 Case was discussed with VIKTORIYA and the patient's admission status was agreed to be Admission Status: observation status to the service of Dr Mague George           Follow-up Information    None         Discharge Medication List as of 10/22/2019  4:36 PM      CONTINUE these medications which have NOT CHANGED    Details   acetaminophen (TYLENOL) 325 mg tablet Take 650 mg by mouth every 6 (six) hours as needed for mild pain, Historical Med      amLODIPine (NORVASC) 5 mg tablet Take 1 tablet (5 mg total) by mouth daily, Starting Thu 7/26/2018, Until Tue 10/22/2019, Normal      aspirin (ECOTRIN LOW STRENGTH) 81 mg EC tablet Take 81 mg by mouth daily , Historical Med      Cholecalciferol (VITAMIN D3) 2000 units capsule Take by mouth daily, Starting Thu 7/21/2016, Historical Med      furosemide (LASIX) 40 mg tablet 40 mg daily , Starting Fri 10/12/2018, Historical Med      Inulin (METAMUCIL CLEAR & NATURAL PO) Take by mouth, Historical Med      Linaclotide (LINZESS) 145 MCG CAPS Take 1 tablet by mouth every other day, Historical Med      magnesium hydroxide (MILK OF MAGNESIA) 400 mg/5 mL oral suspension Take 30 mL by mouth daily as needed for constipation, Historical Med      Multiple Vitamin (ONE-A-DAY MENS) TABS Take 1 tablet by mouth daily, Historical Med      Multiple Vitamins-Minerals (PRESERVISION AREDS) TABS Take 1 tablet by mouth every 12 (twelve) hours, Historical Med      omeprazole (PriLOSEC) 40 MG capsule Take by mouth, Historical Med      potassium chloride (K-DUR) 10 mEq tablet Take 10 mEq by mouth daily , Starting Fri 10/12/2018, Historical Med      tamsulosin (FLOMAX) 0 4 mg Take 1 capsule by mouth daily, Historical Med      atorvastatin (LIPITOR) 20 mg tablet Take 1 tablet by mouth daily at bedtime , Historical Med      enoxaparin (LOVENOX) 40 mg/0 4 mL Inject 0 4 mL (40 mg total) under the skin daily for 30 days, Starting u 12/6/2018, Until Sat 1/5/2019, Print           No discharge procedures on file  ED Provider  Attending physically available and evaluated Jessie Monet I managed the patient along with the ED Attending      Electronically Signed by         Josué Welsh MD  10/24/19 7943

## 2020-04-06 ENCOUNTER — TELEPHONE (OUTPATIENT)
Dept: NEPHROLOGY | Facility: CLINIC | Age: 85
End: 2020-04-06

## 2020-06-11 ENCOUNTER — TRANSCRIBE ORDERS (OUTPATIENT)
Dept: ADMINISTRATIVE | Age: 85
End: 2020-06-11

## 2020-06-11 ENCOUNTER — APPOINTMENT (OUTPATIENT)
Dept: LAB | Age: 85
End: 2020-06-11
Payer: COMMERCIAL

## 2020-06-11 DIAGNOSIS — N40.1 ENLARGED PROSTATE WITH URINARY OBSTRUCTION: Primary | ICD-10-CM

## 2020-06-11 DIAGNOSIS — N13.8 ENLARGED PROSTATE WITH URINARY OBSTRUCTION: ICD-10-CM

## 2020-06-11 DIAGNOSIS — N40.1 ENLARGED PROSTATE WITH URINARY OBSTRUCTION: ICD-10-CM

## 2020-06-11 DIAGNOSIS — N13.8 ENLARGED PROSTATE WITH URINARY OBSTRUCTION: Primary | ICD-10-CM

## 2020-06-11 LAB
BUN SERPL-MCNC: 23 MG/DL (ref 5–25)
CREAT SERPL-MCNC: 1.59 MG/DL (ref 0.6–1.3)
GFR SERPL CREATININE-BSD FRML MDRD: 38 ML/MIN/1.73SQ M
PSA SERPL-MCNC: 2.6 NG/ML (ref 0–4)

## 2020-06-11 PROCEDURE — 82565 ASSAY OF CREATININE: CPT

## 2020-06-11 PROCEDURE — 36415 COLL VENOUS BLD VENIPUNCTURE: CPT | Performed by: UROLOGY

## 2020-06-11 PROCEDURE — 84520 ASSAY OF UREA NITROGEN: CPT

## 2020-06-11 PROCEDURE — G0103 PSA SCREENING: HCPCS | Performed by: UROLOGY

## 2020-06-26 ENCOUNTER — APPOINTMENT (OUTPATIENT)
Dept: LAB | Age: 85
End: 2020-06-26
Payer: COMMERCIAL

## 2020-06-26 LAB
BILIRUB UR QL STRIP: NEGATIVE
CLARITY UR: CLEAR
COLOR UR: YELLOW
GLUCOSE UR STRIP-MCNC: NEGATIVE MG/DL
HGB UR QL STRIP.AUTO: NEGATIVE
KETONES UR STRIP-MCNC: NEGATIVE MG/DL
LEUKOCYTE ESTERASE UR QL STRIP: NEGATIVE
NITRITE UR QL STRIP: NEGATIVE
PH UR STRIP.AUTO: 5.5 [PH]
PROT UR STRIP-MCNC: NEGATIVE MG/DL
SP GR UR STRIP.AUTO: 1.01 (ref 1–1.03)
UROBILINOGEN UR QL STRIP.AUTO: 0.2 E.U./DL

## 2020-06-26 PROCEDURE — 81003 URINALYSIS AUTO W/O SCOPE: CPT | Performed by: UROLOGY

## (undated) DEVICE — SUT VICRYL 1 CT-1 27 IN J261H

## (undated) DEVICE — 2108 SERIES SAGITTAL BLADE (18.6 X 0.64 X 61.1MM)

## (undated) DEVICE — SPONGE LAP 18 X 18 IN STRL RFD

## (undated) DEVICE — PENCIL ELECTROSURG E-Z CLEAN -0035H

## (undated) DEVICE — HEWSON SUTURE RETRIEVER: Brand: HEWSON SUTURE RETRIEVER

## (undated) DEVICE — LIGHT HANDLE COVER SLEEVE DISP BLUE STELLAR

## (undated) DEVICE — PENROSE DRAIN, 18 X 3 8: Brand: CARDINAL HEALTH

## (undated) DEVICE — INTENDED FOR TISSUE SEPARATION, AND OTHER PROCEDURES THAT REQUIRE A SHARP SURGICAL BLADE TO PUNCTURE OR CUT.: Brand: BARD-PARKER SAFETY BLADES SIZE 10, STERILE

## (undated) DEVICE — PLUMEPEN PRO 10FT

## (undated) DEVICE — DRAPE EQUIPMENT RF WAND

## (undated) DEVICE — CHLORAPREP HI-LITE 26ML ORANGE

## (undated) DEVICE — SUT PDS II 3-0 SH 27 IN Z316H

## (undated) DEVICE — THE SIMPULSE SOLO SYSTEM WITH ULTREX RETRACTABLE SPLASH SHIELD TIP: Brand: SIMPULSE SOLO

## (undated) DEVICE — UNDYED BRAIDED (POLYGLACTIN 910), SYNTHETIC ABSORBABLE SUTURE: Brand: COATED VICRYL

## (undated) DEVICE — SUT MONOCRYL 4-0 PS-2 18 IN Y496G

## (undated) DEVICE — SUT ETHIBOND 5 V-37 30 IN MB66G

## (undated) DEVICE — STRL UNIVERSAL MINOR GENERAL: Brand: CARDINAL HEALTH

## (undated) DEVICE — CAPIT HIP BIPOLAR HEAD POR PRIMARY

## (undated) DEVICE — BETHLEHEM TOTAL HIP, KIT: Brand: CARDINAL HEALTH

## (undated) DEVICE — GLOVE SRG BIOGEL 8.5

## (undated) DEVICE — SUT VICRYL 0 CT-1 27 IN J260H

## (undated) DEVICE — PROXIMATE SKIN STAPLERS (35 WIDE) CONTAINS 35 STAINLESS STEEL STAPLES (FIXED HEAD): Brand: PROXIMATE

## (undated) DEVICE — GLOVE INDICATOR PI UNDERGLOVE SZ 8.5 BLUE

## (undated) DEVICE — SUT VICRYL 2-0 CT-1 27 IN J259H

## (undated) DEVICE — ADHESIVE SKN CLSR HISTOACRYL FLEX 0.5ML LF

## (undated) DEVICE — INTENDED FOR TISSUE SEPARATION, AND OTHER PROCEDURES THAT REQUIRE A SHARP SURGICAL BLADE TO PUNCTURE OR CUT.: Brand: BARD-PARKER SAFETY BLADES SIZE 15, STERILE

## (undated) DEVICE — SPONGE 4 X 4 XRAY 16 PLY STRL LF RFD

## (undated) DEVICE — REM POLYHESIVE ADULT PATIENT RETURN ELECTRODE: Brand: VALLEYLAB

## (undated) DEVICE — HOOD: Brand: FLYTE, SURGICOOL

## (undated) DEVICE — IMPERVIOUS STOCKINETTE: Brand: DEROYAL

## (undated) DEVICE — DRESSING MEPILEX AG BORDER 4 X 8 IN

## (undated) DEVICE — SUT VICRYL 3-0 SH 27 IN J416H

## (undated) DEVICE — SUT VICRYL 0 REEL 54 IN J287G

## (undated) DEVICE — NEEDLE 22 G X 1 1/2 SAFETY

## (undated) DEVICE — GLOVE SRG BIOGEL ECLIPSE 7

## (undated) DEVICE — DRESSING MEPILEX BORDER 4 X 8 IN

## (undated) DEVICE — CAPIT HIP STEM POR PRIMARY